# Patient Record
Sex: FEMALE | Race: WHITE | Employment: OTHER | URBAN - METROPOLITAN AREA
[De-identification: names, ages, dates, MRNs, and addresses within clinical notes are randomized per-mention and may not be internally consistent; named-entity substitution may affect disease eponyms.]

---

## 2017-01-26 ENCOUNTER — OFFICE VISIT (OUTPATIENT)
Dept: FAMILY MEDICINE CLINIC | Age: 32
End: 2017-01-26

## 2017-01-26 VITALS
SYSTOLIC BLOOD PRESSURE: 103 MMHG | WEIGHT: 154.6 LBS | HEIGHT: 63 IN | RESPIRATION RATE: 17 BRPM | HEART RATE: 64 BPM | DIASTOLIC BLOOD PRESSURE: 65 MMHG | OXYGEN SATURATION: 100 % | BODY MASS INDEX: 27.39 KG/M2 | TEMPERATURE: 98.2 F

## 2017-01-26 DIAGNOSIS — N92.6 MISSED MENSES: Primary | ICD-10-CM

## 2017-01-26 LAB
HCG URINE, QL. (POC): POSITIVE
VALID INTERNAL CONTROL?: YES

## 2017-01-26 NOTE — PROGRESS NOTES
Subjective:   Michael Maria is a 32 y.o. female who is being seen today for possible pregnancy due to missed menses. LMP beginning of Dec.  Unsure of exact date due to hx of irregular menses 2/2 PCOS. She is sexually active with 1 partner and is not using contraception. [id number]  Hx : no  Hx complications in prior pregnancies: no  Taking PNV: yes  Regular periods/certain LMP: no  1 episode of brief abdominal cramping with exercising    Allergies- reviewed:   No Known Allergies      Medications- reviewed:   Current Outpatient Prescriptions   Medication Sig    multivitamin with iron (DAILY MULTI-VITAMINS/IRON) tablet Take 1 Tab by mouth daily.  norethindrone (MICRONOR) 0.35 mg tab Take 1 Tab by mouth daily.  norethindrone (MICRONOR) 0.35 mg tablet Take 1 Tab by mouth daily.  prenatal vit-fe fum-fa-dss (PRENATAL 19) 29-1 mg Tab Take  by mouth. No current facility-administered medications for this visit. Past Medical History- reviewed:  Past Medical History   Diagnosis Date    Irregular menses     PCOS (polycystic ovarian syndrome)          Past Surgical History- reviewed:   History reviewed. No pertinent past surgical history. Social History- reviewed:  Social History     Social History    Marital status:      Spouse name: N/A    Number of children: N/A    Years of education: N/A     Occupational History    Not on file. Social History Main Topics    Smoking status: Never Smoker    Smokeless tobacco: Never Used    Alcohol use No    Drug use: No    Sexual activity: Yes     Partners: Male     Other Topics Concern    Not on file     Social History Narrative         Immunizations- reviewed: There is no immunization history for the selected administration types on file for this patient.     ROS:  GI: No nausea, vomiting  : No vaginal bleeding      Objective:     Visit Vitals    /65 (BP 1 Location: Left arm, BP Patient Position: Sitting)  Pulse 64    Temp 98.2 °F (36.8 °C) (Oral)    Resp 17    Ht 5' 3\" (1.6 m)    Wt 154 lb 9.6 oz (70.1 kg)    LMP 12/05/2016    SpO2 100%    BMI 27.39 kg/m2       Physical Exam:  GENERAL APPEARANCE: alert, well appearing, in no apparent distress  CV: RRR  LUNGS: CTAB   ABD: soft, nontender, no rebound or guarding  PSYCH: normal mood and affect    Labs:  Urine pregnancy test   Recent Results (from the past 12 hour(s))   AMB POC URINE PREGNANCY TEST, VISUAL COLOR COMPARISON    Collection Time: 01/26/17 11:30 AM   Result Value Ref Range    VALID INTERNAL CONTROL POC Yes     HCG urine, Ql. (POC) Positive Negative         Assessment:       ICD-10-CM ICD-9-CM    1. Missed menses N92.6 626.4 AMB POC URINE PREGNANCY TEST, VISUAL COLOR COMPARISON     Pregnancy at unknown GA. Plan:   · Return to clinic in 2 week(s) for initial prenatal visit and dating ultrasound   · Prenatal vitamins  · Have reviewed miscarriage precautions       Orders Placed This Encounter    AMB POC URINE PREGNANCY TEST, VISUAL COLOR COMPARISON         I have discussed the diagnosis with the patient and the intended plan as seen in the above orders. The patient has received an after-visit summary and questions were answered concerning future plans. I have discussed medication side effects and warnings with the patient as well. Informed pt to return to the office or go to the ER if she experiences vaginal bleeding, vaginal discharge, leaking of fluid, pelvic cramping.       Santiago Guerrero, DO

## 2017-01-26 NOTE — PROGRESS NOTES
Chief Complaint   Patient presents with    Missed Menses     1. Have you been to the ER, urgent care clinic since your last visit? Hospitalized since your last visit? No    2. Have you seen or consulted any other health care providers outside of the [address] [place] [address] since your last visit? Include any pap smears or colon screening.  No

## 2017-01-26 NOTE — PATIENT INSTRUCTIONS
Learning About Pregnancy  Your Care Instructions  Your health in the early weeks of your pregnancy is particularly important for your babys health. Take good care of yourself. Anything you do that harms your body can also harm your baby. Make sure to go to all of your doctor appointments. Regular checkups will help keep you and your baby healthy. Follow-up care is a key part of your treatment and safety. Be sure to make and go to all appointments, and call your doctor if you are having problems. Its also a good idea to know your test results and keep a list of the medicines you take. How can you care for yourself at home? Diet  · Eat a balanced diet. Make sure your diet includes plenty of beans, peas, and leafy green vegetables. · Do not skip meals or go for many hours without eating. If you are nauseated, try to eat a small, healthy snack every 2 to 3 hours. · Do not eat fish that has a high level of mercury, such as shark, swordfish, or mackerel. Do not eat more than one can of tuna each week. · Drink plenty of fluids, enough so that your urine is light yellow or clear like water. If you have kidney, heart, or liver disease and have to limit fluids, talk with your doctor before you increase the amount of fluids you drink. · Cut down on caffeine, such as coffee, tea, and cola. · Do not drink alcohol, such as beer, wine, or hard liquor. · Take a multivitamin that contains at least 400 micrograms (mcg) of folic acid to help prevent birth defects. Fortified cereal and whole wheat bread are good additional sources of folic acid. · Increase the calcium in your diet. Try to drink a quart of skim milk each day. You may also take calcium supplements and choose foods such as cheese and yogurt. Lifestyle  · Make sure you go to your follow-up appointments. · Get plenty of rest. You may be unusually tired while you are pregnant. · Get at least 30 minutes of exercise on most days of the week.  Walking is a good choice. If you have not exercised in the past, start out slowly. Take several short walks each day. · Do not smoke. If you need help quitting, talk to your doctor about stop-smoking programs. These can increase your chances of quitting for good. · Do not touch cat feces or litter boxes. Also, wash your hands after you handle raw meat, and fully cook all meat before you eat it. Wear gloves when you work in the yard or garden, and wash your hands well when you are done. Cat feces, raw or undercooked meat, and contaminated dirt can cause an infection that may harm your baby or lead to a miscarriage. · Do not use saunas or hot tubs. Raising your body temperature may harm your baby. · Avoid chemical fumes, paint fumes, or poisons. · Do not use illegal drugs or alcohol. Medicines  · Review all of your medicines with your doctor. Some of your routine medicines may need to be changed to protect your baby. · Use acetaminophen (Tylenol) to relieve minor problems, such as a mild headache or backache or a mild fever with cold symptoms. Do not use nonsteroidal anti-inflammatory drugs (NSAIDs), such as ibuprofen (Advil, Motrin) or naproxen (Aleve), unless your doctor says it is okay. · Do not take two or more pain medicines at the same time unless the doctor told you to. Many pain medicines have acetaminophen, which is Tylenol. Too much acetaminophen (Tylenol) can be harmful. · Take your medicines exactly as prescribed. Call your doctor if you think you are having a problem with your medicine. To manage morning sickness  · If you feel sick when you first wake up, try eating a small snack (such as crackers) before you get out of bed. Allow some time to digest the snack, and then get out of bed slowly. · Do not skip meals or go for long periods without eating. An empty stomach can make nausea worse. · Eat small, frequent meals instead of three large meals each day. · Drink plenty of fluids.  Sports drinks, such as Gatorade or Powerade, are good choices. · Eat foods that are high in protein but low in fat. · If you are taking iron supplements, ask your doctor if they are necessary. Iron can make nausea worse. · Avoid any smells, such as coffee, that make you feel sick. · Get lots of rest. Morning sickness may be worse when you are tired. Where can you learn more? Go to http://christiana-divine.info/. Enter R635 in the search box to learn more about \"Learning About Pregnancy. \"  Current as of: May 30, 2016  Content Version: 11.1  © 3955-0575 [employer], Payment plugin. Care instructions adapted under license by [employer] (which disclaims liability or warranty for this information). If you have questions about a medical condition or this instruction, always ask your healthcare professional. [address] any warranty or liability for your use of this information.

## 2017-01-26 NOTE — MR AVS SNAPSHOT
Visit Information Date & Time Provider Department Dept. Phone Encounter #  
 1/26/2017 11:15 AM Hanna Gutierrez DO [address] [phone] [id number] Follow-up Instructions Return in about 2 weeks (around 2/9/2017) for IOB and ultrasound . Upcoming Health Maintenance Date Due DTaP/Tdap/Td series (1 - Tdap) 4/28/2006 PAP AKA CERVICAL CYTOLOGY 1/17/2016 INFLUENZA AGE 9 TO ADULT 8/1/2016 Allergies as of 1/26/2017  Review Complete On: 1/26/2017 By: Hanna Gutierrez DO No Known Allergies Current Immunizations  Never Reviewed No immunizations on file. Not reviewed this visit You Were Diagnosed With   
  
 Codes Comments Missed menses    -  Primary ICD-10-CM: N92.6 ICD-9-CM: 626.4 Vitals BP Pulse Temp Resp Height(growth percentile) Weight(growth percentile) 103/65 (BP 1 Location: Left arm, BP Patient Position: Sitting) 64 98.2 °F (36.8 °C) (Oral) 17 5' 3\" (1.6 m) 154 lb 9.6 oz (70.1 kg) LMP SpO2 BMI OB Status Smoking Status 12/05/2016 100% 27.39 kg/m2 Having regular periods Never Smoker Vitals History BMI and BSA Data Body Mass Index Body Surface Area  
 27.39 kg/m 2 1.77 m 2 Preferred Pharmacy Pharmacy Name Phone [facility] [address] [place] [address] [address] [facility] [phone] Your Updated Medication List  
  
   
This list is accurate as of: 1/26/17 11:57 AM.  Always use your most recent med list.  
  
  
  
  
 DAILY MULTI-VITAMINS/IRON tablet Generic drug:  multivitamin with iron Take 1 Tab by mouth daily. * norethindrone 0.35 mg Tab Commonly known as:  Magdaleno & Magdaleno Take 1 Tab by mouth daily. * norethindrone 0.35 mg Tab Commonly known as:  Magdaleno & Magdaleno Take 1 Tab by mouth daily. PRENATAL 19 (WITH DOCUSATE) 29-1 mg Tab Generic drug:  prenatal vit-fe fum-fa-dss Take  by mouth. * Notice: This list has 2 medication(s) that are the same as other medications prescribed for you. Read the directions carefully, and ask your doctor or other care provider to review them with you. We Performed the Following AMB POC URINE PREGNANCY TEST, VISUAL COLOR COMPARISON [83973 CPT(R)] Follow-up Instructions Return in about 2 weeks (around 2/9/2017) for IOB and ultrasound . Patient Instructions Learning About Pregnancy Your Care Instructions Your health in the early weeks of your pregnancy is particularly important for your babys health. Take good care of yourself. Anything you do that harms your body can also harm your baby. Make sure to go to all of your doctor appointments. Regular checkups will help keep you and your baby healthy. Follow-up care is a key part of your treatment and safety. Be sure to make and go to all appointments, and call your doctor if you are having problems. Its also a good idea to know your test results and keep a list of the medicines you take. How can you care for yourself at home? Diet · Eat a balanced diet. Make sure your diet includes plenty of beans, peas, and leafy green vegetables. · Do not skip meals or go for many hours without eating. If you are nauseated, try to eat a small, healthy snack every 2 to 3 hours. · Do not eat fish that has a high level of mercury, such as shark, swordfish, or mackerel. Do not eat more than one can of tuna each week. · Drink plenty of fluids, enough so that your urine is light yellow or clear like water. If you have kidney, heart, or liver disease and have to limit fluids, talk with your doctor before you increase the amount of fluids you drink. · Cut down on caffeine, such as coffee, tea, and cola. · Do not drink alcohol, such as beer, wine, or hard liquor.  
· Take a multivitamin that contains at least 400 micrograms (mcg) of folic acid to help prevent birth defects. Fortified cereal and whole wheat bread are good additional sources of folic acid. · Increase the calcium in your diet. Try to drink a quart of skim milk each day. You may also take calcium supplements and choose foods such as cheese and yogurt. Lifestyle · Make sure you go to your follow-up appointments. · Get plenty of rest. You may be unusually tired while you are pregnant. · Get at least 30 minutes of exercise on most days of the week. Walking is a good choice. If you have not exercised in the past, start out slowly. Take several short walks each day. · Do not smoke. If you need help quitting, talk to your doctor about stop-smoking programs. These can increase your chances of quitting for good. · Do not touch cat feces or litter boxes. Also, wash your hands after you handle raw meat, and fully cook all meat before you eat it. Wear gloves when you work in the yard or garden, and wash your hands well when you are done. Cat feces, raw or undercooked meat, and contaminated dirt can cause an infection that may harm your baby or lead to a miscarriage. · Do not use saunas or hot tubs. Raising your body temperature may harm your baby. · Avoid chemical fumes, paint fumes, or poisons. · Do not use illegal drugs or alcohol. Medicines · Review all of your medicines with your doctor. Some of your routine medicines may need to be changed to protect your baby. · Use acetaminophen (Tylenol) to relieve minor problems, such as a mild headache or backache or a mild fever with cold symptoms. Do not use nonsteroidal anti-inflammatory drugs (NSAIDs), such as ibuprofen (Advil, Motrin) or naproxen (Aleve), unless your doctor says it is okay. · Do not take two or more pain medicines at the same time unless the doctor told you to. Many pain medicines have acetaminophen, which is Tylenol. Too much acetaminophen (Tylenol) can be harmful. · Take your medicines exactly as prescribed. Call your doctor if you think you are having a problem with your medicine. To manage morning sickness · If you feel sick when you first wake up, try eating a small snack (such as crackers) before you get out of bed. Allow some time to digest the snack, and then get out of bed slowly. · Do not skip meals or go for long periods without eating. An empty stomach can make nausea worse. · Eat small, frequent meals instead of three large meals each day. · Drink plenty of fluids. Sports drinks, such as Gatorade or Powerade, are good choices. · Eat foods that are high in protein but low in fat. · If you are taking iron supplements, ask your doctor if they are necessary. Iron can make nausea worse. · Avoid any smells, such as coffee, that make you feel sick. · Get lots of rest. Morning sickness may be worse when you are tired. Where can you learn more? Go to http://christiana-divine.info/. Enter V546 in the search box to learn more about \"Learning About Pregnancy. \" Current as of: May 30, 2016 Content Version: 11.1 © 2546-1368 [employer]. Care instructions adapted under license by [employer] (which disclaims liability or warranty for this information). If you have questions about a medical condition or this instruction, always ask your healthcare professional. [address] any warranty or liability for your use of this information. Introducing [facility]! Saw Richardson introduces [employer] patient portal. Now you can access parts of your medical record, email your doctor's office, and request medication refills online. 1. In your internet browser, go to [email or web address] 2. Click on the First Time User? Click Here link in the Sign In box. You will see the New Member Sign Up page. 3. Enter your [employer] Access Code exactly as it appears below.  You will not need to use this code after youve completed the sign-up process. If you do not sign up before the expiration date, you must request a new code. · [employer] Access Code: [email or web address] Expires: 4/26/2017 11:57 AM 
 
4. Enter the last four digits of your Social Security Number (xxxx) and Date of Birth (mm/dd/yyyy) as indicated and click Submit. You will be taken to the next sign-up page. 5. Create a El[employer] ID. This will be your [employer] login ID and cannot be changed, so think of one that is secure and easy to remember. 6. Create a [employer] password. You can change your password at any time. 7. Enter your Password Reset Question and Answer. This can be used at a later time if you forget your password. 8. Enter your e-mail address. You will receive e-mail notification when new information is available in [address]. 9. Click Sign Up. You can now view and download portions of your medical record. 10. Click the Download Summary menu link to download a portable copy of your medical information. If you have questions, please visit the Frequently Asked Questions section of the [employer] website. Remember, [employer] is NOT to be used for urgent needs. For medical emergencies, dial 911. Now available from your iPhone and Android! Please provide this summary of care documentation to your next provider. Your primary care clinician is listed as [address] [place] [address]. If you have any questions after today's visit, please call [phone].

## 2017-02-10 ENCOUNTER — INITIAL PRENATAL (OUTPATIENT)
Dept: FAMILY MEDICINE CLINIC | Age: 32
End: 2017-02-10

## 2017-02-10 VITALS
DIASTOLIC BLOOD PRESSURE: 68 MMHG | HEART RATE: 98 BPM | OXYGEN SATURATION: 99 % | WEIGHT: 159 LBS | SYSTOLIC BLOOD PRESSURE: 118 MMHG | HEIGHT: 63 IN | BODY MASS INDEX: 28.17 KG/M2 | TEMPERATURE: 97.9 F

## 2017-02-10 DIAGNOSIS — Z34.01 ENCOUNTER FOR SUPERVISION OF NORMAL FIRST PREGNANCY IN FIRST TRIMESTER: Primary | ICD-10-CM

## 2017-02-10 LAB
ANTIBODY SCREEN, EXTERNAL: NEGATIVE
BILIRUB UR QL STRIP: NEGATIVE
GLUCOSE UR-MCNC: NEGATIVE MG/DL
HBSAG, EXTERNAL: NEGATIVE
HIV, EXTERNAL: NORMAL
KETONES P FAST UR STRIP-MCNC: NORMAL MG/DL
PH UR STRIP: 6 [PH] (ref 4.6–8)
PROT UR QL STRIP: NEGATIVE MG/DL
RPR, EXTERNAL: NORMAL
RUBELLA, EXTERNAL: NORMAL
SP GR UR STRIP: 1.02 (ref 1–1.03)
TYPE, ABO & RH, EXTERNAL: NORMAL
UA UROBILINOGEN AMB POC: NORMAL (ref 0.2–1)
URINALYSIS CLARITY POC: CLEAR
URINALYSIS COLOR POC: YELLOW
URINE BLOOD POC: NEGATIVE
URINE LEUKOCYTES POC: NEGATIVE
URINE NITRITES POC: NEGATIVE

## 2017-02-10 NOTE — PROGRESS NOTES
History and Physical    Patient: Chandrika Cornell MRN: [de-identified]  SSN: [id number]    YOB: 1985  Age: 32 y.o. Sex: female      Subjective:      Chandrika Cornell is a 32 y.o. female  at [id number] who presents for [address] visit. Past Medical History   Diagnosis Date    Irregular menses     PCOS (polycystic ovarian syndrome)      History reviewed. No pertinent past surgical history. Family History   Problem Relation Age of Onset    Hypertension Mother     Stroke Mother     Other Mother      Sturge Radford Syndrome    Asthma Father     Elevated Lipids Father     Other Sister      scolosis     Social History   Substance Use Topics    Smoking status: Never Smoker    Smokeless tobacco: Never Used    Alcohol use No      Prior to Admission medications    Medication Sig Start Date End Date Taking? Authorizing Provider   multivitamin with iron (DAILY MULTI-VITAMINS/IRON) tablet Take 1 Tab by mouth daily. Yes Historical Provider   prenatal vit-fe fum-fa-dss (PRENATAL 19) 29-1 mg Tab Take  by mouth. Yes Historical Provider   norethindrone (MICRONOR) 0.35 mg tab Take 1 Tab by mouth daily. 16   Fabien Paz MD   norethindrone (MICRONOR) 0.35 mg tablet Take 1 Tab by mouth daily. 9/11/15   Terrence De La Cruz MD        No Known Allergies    Review of Systems:  ROS negative except as noted in HPI.     Objective:     Vitals:    02/10/17 1446   BP: 118/68   Pulse: 98   Temp: 97.9 °F (36.6 °C)   TempSrc: Oral   SpO2: 99%   Weight: 159 lb (72.1 kg)   Height: 5' 3\" (1.6 m)        Physical Exam:  See prenatal physical exam.    Assessment/Plan:   25yo [id number] @ approx 9wk 4days by uncertain LMP here for IOB visit  · IOB labs with pap today  · Awaiting dating sono for uncertain LMP then can refer to [facility] for first tri screening if pt desires     Signed By: Jailyn Flores DO     2017

## 2017-02-10 NOTE — PROGRESS NOTES
Chief Complaint   Patient presents with    Initial Prenatal Visit     1. Have you been to the ER, urgent care clinic since your last visit? Hospitalized since your last visit? No    2. Have you seen or consulted any other health care providers outside of the [address] [place] [address] since your last visit? Include any pap smears or colon screening.  No

## 2017-02-10 NOTE — MR AVS SNAPSHOT
Visit Information Date & Time Provider Department Dept. Phone Encounter #  
 2/10/2017  2:30 PM Afshan Moncada DO [address] [facility] [phone] [id number] Upcoming Health Maintenance Date Due DTaP/Tdap/Td series (1 - Tdap) 4/28/2006 PAP AKA CERVICAL CYTOLOGY 1/17/2016 INFLUENZA AGE 9 TO ADULT 8/1/2016 Allergies as of 2/10/2017  Review Complete On: 2/10/2017 By: Afshan Moncada DO No Known Allergies Current Immunizations  Never Reviewed No immunizations on file. Not reviewed this visit You Were Diagnosed With   
  
 Codes Comments Less than 8 weeks gestation of pregnancy    -  Primary ICD-10-CM: Z3A.01 
ICD-9-CM: V22.2 Vitals BP Pulse Temp Height(growth percentile) Weight(growth percentile) LMP  
 118/68 (BP 1 Location: Right arm, BP Patient Position: Sitting) 98 97.9 °F (36.6 °C) (Oral) 5' 3\" (1.6 m) 159 lb (72.1 kg) 12/05/2016 SpO2 BMI OB Status Smoking Status 99% 28.17 kg/m2 Pregnant Never Smoker BMI and BSA Data Body Mass Index Body Surface Area  
 28.17 kg/m 2 1.79 m 2 Preferred Pharmacy Pharmacy Name Phone [facility] [address]  [facility] [phone] Your Updated Medication List  
  
   
This list is accurate as of: 2/10/17  2:59 PM.  Always use your most recent med list.  
  
  
  
  
 DAILY MULTI-VITAMINS/IRON tablet Generic drug:  multivitamin with iron Take 1 Tab by mouth daily. * norethindrone 0.35 mg Tab Commonly known as:  Magdaleno & Magdaleno Take 1 Tab by mouth daily. * norethindrone 0.35 mg Tab Commonly known as:  Magdaleno & Magdaleno Take 1 Tab by mouth daily. PRENATAL 19 (WITH DOCUSATE) 29-1 mg Tab Generic drug:  prenatal vit-fe fum-fa-dss Take  by mouth. * Notice:   This list has 2 medication(s) that are the same as other medications prescribed for you. Read the directions carefully, and ask your doctor or other care provider to review them with you. We Performed the Following AMB POC URINALYSIS DIP STICK AUTO W/O MICRO [29599 CPT(R)] ANTIBODY SCREEN [id number] CPT(R)] CBC WITH AUTOMATED DIFF [32813 CPT(R)] Scherer Fast / [facility] [id number] CPT(R)] CULTURE, URINE [id number] CPT(R)] HEP B SURFACE AG [id number] CPT(R)] HEPATITIS C AB [35824 CPT(R)] HIV 1/2 AG/AB, 4TH GENERATION,W RFLX CONFIRM [[id number] Custom] PAP IG, APTIMA HPV AND RFX 16/18,45 ([id number]) [[id number] Custom] RPR [26804 CPT(R)] RUBELLA AB, IGG [id number] CPT(R)] TYPE, ABO & RH [25935 CPT(R)] Patient Instructions Weeks 6 to 10 of Your Pregnancy: Care Instructions Your Care Instructions Congratulations on your pregnancy. This is an exciting and important time for you. During the first 6 to 10 weeks of your pregnancy, your body goes through many changes. Your baby grows very fast, even though you cannot feel it yet. You may start to notice that you feel different, both in your body and your emotions. Because each woman's pregnancy is unique, there is no right way to feel. You may feel the healthiest you have ever been, or you may feel tired or sick to your stomach (\"morning sickness\"). These early weeks are a time to make healthy choices and to eat the best foods for you and your baby. This care sheet will give you some ideas. This is also a good time to think about birth defects testing. These are tests done during pregnancy to look for possible problems with the baby. First trimester tests for birth defects can be done between 8 and 17 weeks of pregnancy, depending on the test. Talk with your doctor about what kinds of tests are available. Follow-up care is a key part of your treatment and safety.  Be sure to make and go to all appointments, and call your doctor if you are having problems. It's also a good idea to know your test results and keep a list of the medicines you take. How can you care for yourself at home? Eat well · Eat at least 3 meals and 2 healthy snacks every day. Eat fresh, whole foods, including: ¨ 7 or more servings of bread, tortillas, cereal, rice, pasta, or oatmeal. 
¨ 3 or more servings of vegetables, especially leafy green vegetables. ¨ 2 or more servings of fruits. ¨ 3 or more servings of milk, yogurt, or cheese. ¨ 2 or more servings of meat, turkey, chicken, fish, eggs, or dried beans. · Drink plenty of fluids, especially water. Avoid sodas and other sweetened drinks. · Choose foods that have important vitamins for your baby, such as calcium, iron, and folate. ¨ Dairy products, tofu, canned fish with bones, almonds, broccoli, dark leafy greens, corn tortillas, and fortified orange juice are good sources of calcium. ¨ Beef, poultry, liver, spinach, lentils, dried beans, fortified cereals, and dried fruits are rich in iron. ¨ Dark leafy greens, broccoli, asparagus, liver, fortified cereals, orange juice, peanuts, and almonds are good sources of folate. · Avoid foods that could harm your baby. ¨ Do not eat raw or undercooked meat, chicken, or fish (such as sushi or raw oysters). ¨ Do not eat raw eggs or foods that contain raw eggs, such as Caesar dressing. ¨ Do not eat soft cheeses and unpasteurized dairy foods, such as Brie, feta, or blue cheese. ¨ Do not eat fish that contains a lot of mercury, such as shark, swordfish, tilefish, or trang mackerel. Do not eat more than 6 ounces of tuna each week. ¨ Do not eat raw sprouts, especially alfalfa sprouts. ¨ Cut down on caffeine, such as coffee, tea, and cola. Protect yourself and your baby · Do not touch vilma litter or cat feces. They can cause an infection that could harm your baby. · High body temperature can be harmful to your baby.  So if you want to use a sauna or hot tub, be sure to talk to your doctor about how to use it safely. [place] with morning sickness · Sip small amounts of water, juices, or shakes. Try drinking between meals, not with meals. · Eat 5 or 6 small meals a day. Try dry toast or crackers when you first get up, and eat breakfast a little later. · Avoid spicy, greasy, and fatty foods. · When you feel sick, open your windows or go for a short walk to get fresh air. · Try nausea wristbands. These help some women. · Tell your doctor if you think your prenatal vitamins make you sick. Where can you learn more? Go to http://christiana-divine.info/. Enter G112 in the search box to learn more about \"Weeks 6 to 10 of Your Pregnancy: Care Instructions. \" Current as of: May 30, 2016 Content Version: 11.1 © 4467-1796 FraudMetrix. Care instructions adapted under license by Ensighten (which disclaims liability or warranty for this information). If you have questions about a medical condition or this instruction, always ask your healthcare professional. [address] any warranty or liability for your use of this information. Introducing [facility]! Herb Bhagat introduces Prixel patient portal. Now you can access parts of your medical record, email your doctor's office, and request medication refills online. 1. In your internet browser, go to [email or web address] 2. Click on the First Time User? Click Here link in the Sign In box. You will see the New Member Sign Up page. 3. Enter your Prixel Access Code exactly as it appears below. You will not need to use this code after youve completed the sign-up process. If you do not sign up before the expiration date, you must request a new code. · Prixel Access Code: [email or web address] Expires: 4/26/2017 11:57 AM 
 
4.  Enter the last four digits of your Social Security Number (xxxx) and Date of Birth (mm/dd/yyyy) as indicated and click Submit. You will be taken to the next sign-up page. 5. Create a Biopipe Global ID. This will be your Biopipe Global login ID and cannot be changed, so think of one that is secure and easy to remember. 6. Create a Biopipe Global password. You can change your password at any time. 7. Enter your Password Reset Question and Answer. This can be used at a later time if you forget your password. 8. Enter your e-mail address. You will receive e-mail notification when new information is available in [address] 9. Click Sign Up. You can now view and download portions of your medical record. 10. Click the Download Summary menu link to download a portable copy of your medical information. If you have questions, please visit the Frequently Asked Questions section of the Biop[employer] Global website. Remember, Biopipe Global is NOT to be used for urgent needs. For medical emergencies, dial 911. Now available from your iPhone and Android! Please provide this summary of care documentation to your next provider. Your primary care clinician is listed as [address] [place] [address]. If you have any questions after today's visit, please call [phone].

## 2017-02-10 NOTE — PATIENT INSTRUCTIONS
Weeks 6 to 10 of Your Pregnancy: Care Instructions  Your Care Instructions    Congratulations on your pregnancy. This is an exciting and important time for you. During the first 6 to 10 weeks of your pregnancy, your body goes through many changes. Your baby grows very fast, even though you cannot feel it yet. You may start to notice that you feel different, both in your body and your emotions. Because each woman's pregnancy is unique, there is no right way to feel. You may feel the healthiest you have ever been, or you may feel tired or sick to your stomach (\"morning sickness\"). These early weeks are a time to make healthy choices and to eat the best foods for you and your baby. This care sheet will give you some ideas. This is also a good time to think about birth defects testing. These are tests done during pregnancy to look for possible problems with the baby. First trimester tests for birth defects can be done between 8 and 17 weeks of pregnancy, depending on the test. Talk with your doctor about what kinds of tests are available. Follow-up care is a key part of your treatment and safety. Be sure to make and go to all appointments, and call your doctor if you are having problems. It's also a good idea to know your test results and keep a list of the medicines you take. How can you care for yourself at home? Eat well  · Eat at least 3 meals and 2 healthy snacks every day. Eat fresh, whole foods, including:  ¨ 7 or more servings of bread, tortillas, cereal, rice, pasta, or oatmeal.  ¨ 3 or more servings of vegetables, especially leafy green vegetables. ¨ 2 or more servings of fruits. ¨ 3 or more servings of milk, yogurt, or cheese. ¨ 2 or more servings of meat, turkey, chicken, fish, eggs, or dried beans. · Drink plenty of fluids, especially water. Avoid sodas and other sweetened drinks. · Choose foods that have important vitamins for your baby, such as calcium, iron, and folate.   ¨ Dairy products, tofu, canned fish with bones, almonds, broccoli, dark leafy greens, corn tortillas, and fortified orange juice are good sources of calcium. ¨ Beef, poultry, liver, spinach, lentils, dried beans, fortified cereals, and dried fruits are rich in iron. ¨ Dark leafy greens, broccoli, asparagus, liver, fortified cereals, orange juice, peanuts, and almonds are good sources of folate. · Avoid foods that could harm your baby. ¨ Do not eat raw or undercooked meat, chicken, or fish (such as sushi or raw oysters). ¨ Do not eat raw eggs or foods that contain raw eggs, such as Caesar dressing. ¨ Do not eat soft cheeses and unpasteurized dairy foods, such as Brie, feta, or blue cheese. ¨ Do not eat fish that contains a lot of mercury, such as shark, swordfish, tilefish, or trang mackerel. Do not eat more than 6 ounces of tuna each week. ¨ Do not eat raw sprouts, especially alfalfa sprouts. ¨ Cut down on caffeine, such as coffee, tea, and cola. Protect yourself and your baby  · Do not touch vilma litter or cat feces. They can cause an infection that could harm your baby. · High body temperature can be harmful to your baby. So if you want to use a sauna or hot tub, be sure to talk to your doctor about how to use it safely. [place] with morning sickness  · Sip small amounts of water, juices, or shakes. Try drinking between meals, not with meals. · Eat 5 or 6 small meals a day. Try dry toast or crackers when you first get up, and eat breakfast a little later. · Avoid spicy, greasy, and fatty foods. · When you feel sick, open your windows or go for a short walk to get fresh air. · Try nausea wristbands. These help some women. · Tell your doctor if you think your prenatal vitamins make you sick. Where can you learn more? Go to http://cody.info/. Enter G112 in the search box to learn more about \"Weeks 6 to 10 of Your Pregnancy: Care Instructions. \"  Current as of:  May 30, 2016  Content Version: 11.1  © 7541-1442 V[employer] Research[employer]. Care instructions adapted under license by Giftango (which disclaims liability or warranty for this information). If you have questions about a medical condition or this instruction, always ask your healthcare professional. [address] any warranty or liability for your use of this information.

## 2017-02-13 LAB — BACTERIA UR CULT: ABNORMAL

## 2017-02-15 DIAGNOSIS — O23.41 UTI IN PREGNANCY, FIRST TRIMESTER: Primary | ICD-10-CM

## 2017-02-15 RX ORDER — NITROFURANTOIN 25; 75 MG/1; MG/1
100 CAPSULE ORAL 2 TIMES DAILY
Qty: 14 CAP | Refills: 0 | Status: SHIPPED | OUTPATIENT
Start: 2017-02-15 | End: 2017-02-22

## 2017-02-21 ENCOUNTER — OFFICE VISIT (OUTPATIENT)
Dept: FAMILY MEDICINE CLINIC | Age: 32
End: 2017-02-21

## 2017-02-21 VITALS
HEIGHT: 63 IN | HEART RATE: 63 BPM | RESPIRATION RATE: 16 BRPM | BODY MASS INDEX: 28.92 KG/M2 | WEIGHT: 163.2 LBS | OXYGEN SATURATION: 98 % | SYSTOLIC BLOOD PRESSURE: 121 MMHG | TEMPERATURE: 98 F | DIASTOLIC BLOOD PRESSURE: 74 MMHG

## 2017-02-21 DIAGNOSIS — Z34.01 ENCOUNTER FOR SUPERVISION OF NORMAL FIRST PREGNANCY IN FIRST TRIMESTER: Primary | ICD-10-CM

## 2017-02-21 NOTE — PROGRESS NOTES
Dating Ultrasound Procedure Report    Celesta Alpers is a 32 y.o. with pregnancy at 11w5d by LMP here for dating ultrasound. Role of ultrasound in pregnancy discussed with patient. Patient consents to undergo dating ultrasound. [facility]  OFFICE PROCEDURE PROGRESS NOTE      Chart reviewed for the following:   Woody De Oliveira MD, have reviewed the History, Physical and updated the Allergic reactions for Elissa L Pablo     TIME OUT performed immediately prior to start of procedure:   Woody De Oliveira MD, have performed the following reviews on Celesta Alpers prior to the start of the procedure:            * Patient was identified by name and date of birth   * Agreement on procedure being performed was verified  * Risks and Benefits explained to the patient  * Procedure site verified and marked as necessary  * Patient was positioned for comfort  * Consent was signed and verified     Time: 4:15 PM    Date of procedure: 2017    Procedure performed by:  Josh Harmon MD    Provider assisted by: Milady Abdul MD    Patient assisted by: self    How tolerated by patient: tolerated the procedure well with no complications    Ultrasound performed at bedside for evaluation of pregnancy. Ultrasound Type: Transabdominal  Findings: Single  intrauterine pregnancy with EGA 9 [id number] by crown rump length on transabdominal ultrasound. Positive fetal movement, fetal heart beat present, normal appearing amiotic fluid, normal uterus, other maternal structures not visualized. GUERO 2017. Estimated Gestational Age by Ultrasound: 9 weeks 0 days  Dr. Stephan Goddard (attending) present for entire procedure. Plan:   32yo  at 9 weeks 0 days confirmed by US  1. The patient's GA has a discrepancy in 2 weeks and 4 days based on her LMP. Will change GA and GUERO based on US. 2.Will collect prenatal labs ordered by Dr. Nhan Paul on 2/10/2017  3.  Follow up with  Vest in 3 weeks for routine prenatal visit   UTI with pan susceptible E. Coli. The Rx for Macrobid was sent to the pharmacy but the pt never filled that. We discussed the importance of treating the UTI especially during the pregnancy.  The pt was advised to take the full course of Macrobid.  -Will collect the urine culture next visit after the treatment course    Saray Hooks MD; [facility] Medicine Resident

## 2017-02-21 NOTE — PATIENT INSTRUCTIONS
Weeks 6 to 10 of Your Pregnancy: Care Instructions  Your Care Instructions    Congratulations on your pregnancy. This is an exciting and important time for you. During the first 6 to 10 weeks of your pregnancy, your body goes through many changes. Your baby grows very fast, even though you cannot feel it yet. You may start to notice that you feel different, both in your body and your emotions. Because each woman's pregnancy is unique, there is no right way to feel. You may feel the healthiest you have ever been, or you may feel tired or sick to your stomach (\"morning sickness\"). These early weeks are a time to make healthy choices and to eat the best foods for you and your baby. This care sheet will give you some ideas. This is also a good time to think about birth defects testing. These are tests done during pregnancy to look for possible problems with the baby. First trimester tests for birth defects can be done between 8 and 17 weeks of pregnancy, depending on the test. Talk with your doctor about what kinds of tests are available. Follow-up care is a key part of your treatment and safety. Be sure to make and go to all appointments, and call your doctor if you are having problems. It's also a good idea to know your test results and keep a list of the medicines you take. How can you care for yourself at home? Eat well  · Eat at least 3 meals and 2 healthy snacks every day. Eat fresh, whole foods, including:  ¨ 7 or more servings of bread, tortillas, cereal, rice, pasta, or oatmeal.  ¨ 3 or more servings of vegetables, especially leafy green vegetables. ¨ 2 or more servings of fruits. ¨ 3 or more servings of milk, yogurt, or cheese. ¨ 2 or more servings of meat, turkey, chicken, fish, eggs, or dried beans. · Drink plenty of fluids, especially water. Avoid sodas and other sweetened drinks. · Choose foods that have important vitamins for your baby, such as calcium, iron, and folate.   ¨ Dairy products, tofu, canned fish with bones, almonds, broccoli, dark leafy greens, corn tortillas, and fortified orange juice are good sources of calcium. ¨ Beef, poultry, liver, spinach, lentils, dried beans, fortified cereals, and dried fruits are rich in iron. ¨ Dark leafy greens, broccoli, asparagus, liver, fortified cereals, orange juice, peanuts, and almonds are good sources of folate. · Avoid foods that could harm your baby. ¨ Do not eat raw or undercooked meat, chicken, or fish (such as sushi or raw oysters). ¨ Do not eat raw eggs or foods that contain raw eggs, such as Caesar dressing. ¨ Do not eat soft cheeses and unpasteurized dairy foods, such as Brie, feta, or blue cheese. ¨ Do not eat fish that contains a lot of mercury, such as shark, swordfish, tilefish, or trang mackerel. Do not eat more than 6 ounces of tuna each week. ¨ Do not eat raw sprouts, especially alfalfa sprouts. ¨ Cut down on caffeine, such as coffee, tea, and cola. Protect yourself and your baby  · Do not touch vilma litter or cat feces. They can cause an infection that could harm your baby. · High body temperature can be harmful to your baby. So if you want to use a sauna or hot tub, be sure to talk to your doctor about how to use it safely. [place] with morning sickness  · Sip small amounts of water, juices, or shakes. Try drinking between meals, not with meals. · Eat 5 or 6 small meals a day. Try dry toast or crackers when you first get up, and eat breakfast a little later. · Avoid spicy, greasy, and fatty foods. · When you feel sick, open your windows or go for a short walk to get fresh air. · Try nausea wristbands. These help some women. · Tell your doctor if you think your prenatal vitamins make you sick. Where can you learn more? Go to http://christiana-divine.info/. Enter G112 in the search box to learn more about \"Weeks 6 to 10 of Your Pregnancy: Care Instructions. \"  Current as of:  May 30, 2016  Content Version: 11.1  © 1969-0096 SoPost. Care instructions adapted under license by [employer] (which disclaims liability or warranty for this information). If you have questions about a medical condition or this instruction, always ask your healthcare professional. [address] any warranty or liability for your use of this information.   Urinary Tract Infection in Pregnancy: Care Instructions  Your Care Instructions    A urinary tract infection, or UTI, is an infection of the bladder and other urinary structures. Most UTIs occur in the bladder. They often cause pain or burning when you urinate. UTI is the most common bacterial infection in pregnancy. If untreated, a UTI could lead to problems such as a kidney infection or  labor. Most UTIs can be cured with antibiotics. Your doctor will prescribe an antibiotic that is safe during pregnancy. Be sure to finish your medicine so that the infection does not spread to your kidneys. Follow-up care is a key part of your treatment and safety. Be sure to make and go to all appointments, and call your doctor if you are having problems. It's also a good idea to know your test results and keep a list of the medicines you take. How can you care for yourself at home? · Take your antibiotics as directed. Do not stop taking them just because you feel better. You need to take the full course of antibiotics. · Drink extra water and other fluids for the next day or two. This will help wash out the bacteria causing the infection. If you have kidney, heart, or liver disease and have to limit fluids, talk with your doctor before you increase the amount of fluids you drink. · Drink cranberry juice to help fight bacteria in the bladder. · Do not drink alcohol. · Urinate often. Try to empty your bladder each time. Preventing UTIs  · Drink plenty of fluids, enough so that your urine is light yellow or clear like water.  This helps you urinate often, which clears bacteria from your system. If you have kidney, heart, or liver disease and have to limit fluids, talk with your doctor before you increase the amount of fluids you drink. · Drink cranberry juice. · Urinate when you first have the urge. · Urinate right after you have sex. This is the best way for women to avoid UTIs. · When going to the bathroom, wipe from front to back to keep bacteria from entering the vagina or urethra. When should you call for help? Call your doctor now or seek immediate medical care if:  · Symptoms such as fever, chills, nausea, or vomiting get worse or appear for the first time. · You have new pain in your back just below your rib cage. This is called flank pain. · There is new blood or pus in your urine. · You have any problems with your antibiotic medicine. Watch closely for changes in your health, and be sure to contact your doctor if:  · You are not getting better after 1 day (24 hours). · You have new symptoms, such as blood in your urine. Where can you learn more? Go to http://christiana-divine.info/. Enter M982 in the search box to learn more about \"Urinary Tract Infection in Pregnancy: Care Instructions. \"  Current as of: June 8, 2016  Content Version: 11.1  © 0505-4652 [employer]. Care instructions adapted under license by OneMln (which disclaims liability or warranty for this information).  If you have questions about a medical condition or this instruction, always ask your healthcare professional. [place] [address] any warranty or liability for your use of this information.

## 2017-02-21 NOTE — PROGRESS NOTES
Chief Complaint   Patient presents with    Ultrasound     dating ultrasound     1. Have you been to the ER, urgent care clinic since your last visit? Hospitalized since your last visit? No    2. Have you seen or consulted any other health care providers outside of the [address] [place] [address] since your last visit? Include any pap smears or colon screening. No     Pt denies any bleeding, cramping or leakage of fluid.

## 2017-02-21 NOTE — MR AVS SNAPSHOT
Visit Information Date & Time Provider Department Dept. Phone Encounter #  
 2/21/2017  4:00 PM Sandrita Montes De Oca MD [facility]5 [facility] [phone] [id number] Follow-up Instructions Return in about 3 weeks (around 3/14/2017) for Routine prenatal.  
  
Your Appointments 3/10/2017  1:00 PM  
OB VISIT with Santiago Guerrero DO 1515 [facility] ([address]) Appt Note: 13 weeks [address]  
[phone]  
  
   
 [address] [facility] [phone] Upcoming Health Maintenance Date Due DTaP/Tdap/Td series (1 - Tdap) 4/28/2006 PAP AKA CERVICAL CYTOLOGY 1/17/2016 INFLUENZA AGE 9 TO ADULT 8/1/2016 Allergies as of 2/21/2017  Review Complete On: 2/21/2017 By: Sandrita Montes De Oca MD  
 No Known Allergies Current Immunizations  Never Reviewed No immunizations on file. Not reviewed this visit You Were Diagnosed With   
  
 Codes Comments Encounter for supervision of normal first pregnancy in first trimester    -  Primary ICD-10-CM: Z34.01 
ICD-9-CM: V22.0 Vitals BP Pulse Temp Resp Height(growth percentile) Weight(growth percentile) 121/74 63 98 °F (36.7 °C) (Oral) 16 5' 3\" (1.6 m) 163 lb 3.2 oz (74 kg) LMP SpO2 BMI OB Status Smoking Status 12/05/2016 (Approximate) 98% 28.91 kg/m2 Pregnant Never Smoker BMI and BSA Data Body Mass Index Body Surface Area  
 28.91 kg/m 2 1.81 m 2 Preferred Pharmacy Pharmacy Name Phone [facility] [address]  [facility] [phone] Your Updated Medication List  
  
   
This list is accurate as of: 2/21/17  4:40 PM.  Always use your most recent med list.  
  
  
  
  
 DAILY MULTI-VITAMINS/IRON tablet Generic drug:  multivitamin with iron Take 1 Tab by mouth daily. nitrofurantoin (macrocrystal-monohydrate) 100 mg capsule Commonly known as:  MACROBID Take 1 Cap by mouth two (2) times a day for 7 days. * norethindrone 0.35 mg Tab Commonly known as:  Magdaleno & Magdaleno Take 1 Tab by mouth daily. * norethindrone 0.35 mg Tab Commonly known as:  Magdaleno & Magdaleno Take 1 Tab by mouth daily. PRENATAL 19 (WITH DOCUSATE) 29-1 mg Tab Generic drug:  prenatal vit-fe fum-fa-dss Take  by mouth. * Notice: This list has 2 medication(s) that are the same as other medications prescribed for you. Read the directions carefully, and ask your doctor or other care provider to review them with you. Follow-up Instructions Return in about 3 weeks (around 3/14/2017) for Routine prenatal.  
  
  
Patient Instructions   
   
Weeks 6 to 10 of Your Pregnancy: Care Instructions Your Care Instructions Congratulations on your pregnancy. This is an exciting and important time for you. During the first 6 to 10 weeks of your pregnancy, your body goes through many changes. Your baby grows very fast, even though you cannot feel it yet. You may start to notice that you feel different, both in your body and your emotions. Because each woman's pregnancy is unique, there is no right way to feel. You may feel the healthiest you have ever been, or you may feel tired or sick to your stomach (\"morning sickness\"). These early weeks are a time to make healthy choices and to eat the best foods for you and your baby. This care sheet will give you some ideas. This is also a good time to think about birth defects testing. These are tests done during pregnancy to look for possible problems with the baby. First trimester tests for birth defects can be done between 8 and 17 weeks of pregnancy, depending on the test. Talk with your doctor about what kinds of tests are available. Follow-up care is a key part of your treatment and safety.  Be sure to make and go to all appointments, and call your doctor if you are having problems. It's also a good idea to know your test results and keep a list of the medicines you take. How can you care for yourself at home? Eat well · Eat at least 3 meals and 2 healthy snacks every day. Eat fresh, whole foods, including: ¨ 7 or more servings of bread, tortillas, cereal, rice, pasta, or oatmeal. 
¨ 3 or more servings of vegetables, especially leafy green vegetables. ¨ 2 or more servings of fruits. ¨ 3 or more servings of milk, yogurt, or cheese. ¨ 2 or more servings of meat, turkey, chicken, fish, eggs, or dried beans. · Drink plenty of fluids, especially water. Avoid sodas and other sweetened drinks. · Choose foods that have important vitamins for your baby, such as calcium, iron, and folate. ¨ Dairy products, tofu, canned fish with bones, almonds, broccoli, dark leafy greens, corn tortillas, and fortified orange juice are good sources of calcium. ¨ Beef, poultry, liver, spinach, lentils, dried beans, fortified cereals, and dried fruits are rich in iron. ¨ Dark leafy greens, broccoli, asparagus, liver, fortified cereals, orange juice, peanuts, and almonds are good sources of folate. · Avoid foods that could harm your baby. ¨ Do not eat raw or undercooked meat, chicken, or fish (such as sushi or raw oysters). ¨ Do not eat raw eggs or foods that contain raw eggs, such as Caesar dressing. ¨ Do not eat soft cheeses and unpasteurized dairy foods, such as Brie, feta, or blue cheese. ¨ Do not eat fish that contains a lot of mercury, such as shark, swordfish, tilefish, or trang mackerel. Do not eat more than 6 ounces of tuna each week. ¨ Do not eat raw sprouts, especially alfalfa sprouts. ¨ Cut down on caffeine, such as coffee, tea, and cola. Protect yourself and your baby · Do not touch vilma litter or cat feces. They can cause an infection that could harm your baby. · High body temperature can be harmful to your baby. So if you want to use a sauna or hot tub, be sure to talk to your doctor about how to use it safely. [place] with morning sickness · Sip small amounts of water, juices, or shakes. Try drinking between meals, not with meals. · Eat 5 or 6 small meals a day. Try dry toast or crackers when you first get up, and eat breakfast a little later. · Avoid spicy, greasy, and fatty foods. · When you feel sick, open your windows or go for a short walk to get fresh air. · Try nausea wristbands. These help some women. · Tell your doctor if you think your prenatal vitamins make you sick. Where can you learn more? Go to http://christiana-divine.info/. Enter G112 in the search box to learn more about \"Weeks 6 to 10 of Your Pregnancy: Care Instructions. \" Current as of: May 30, 2016 Content Version: 11.1 © 7556-8239 [employer]. Care instructions adapted under license by [employer] (which disclaims liability or warranty for this information). If you have questions about a medical condition or this instruction, always ask your healthcare professional. [place] [address] any warranty or liability for your use of this information. 
 Urinary Tract Infection in Pregnancy: Care Instructions Your Care Instructions A urinary tract infection, or UTI, is an infection of the bladder and other urinary structures. Most UTIs occur in the bladder. They often cause pain or burning when you urinate. UTI is the most common bacterial infection in pregnancy. If untreated, a UTI could lead to problems such as a kidney infection or  labor. Most UTIs can be cured with antibiotics. Your doctor will prescribe an antibiotic that is safe during pregnancy. Be sure to finish your medicine so that the infection does not spread to your kidneys. Follow-up care is a key part of your treatment and safety.  Be sure to make and go to all appointments, and call your doctor if you are having problems. It's also a good idea to know your test results and keep a list of the medicines you take. How can you care for yourself at home? · Take your antibiotics as directed. Do not stop taking them just because you feel better. You need to take the full course of antibiotics. · Drink extra water and other fluids for the next day or two. This will help wash out the bacteria causing the infection. If you have kidney, heart, or liver disease and have to limit fluids, talk with your doctor before you increase the amount of fluids you drink. · Drink cranberry juice to help fight bacteria in the bladder. · Do not drink alcohol. · Urinate often. Try to empty your bladder each time. Preventing UTIs · Drink plenty of fluids, enough so that your urine is light yellow or clear like water. This helps you urinate often, which clears bacteria from your system. If you have kidney, heart, or liver disease and have to limit fluids, talk with your doctor before you increase the amount of fluids you drink. · Drink cranberry juice. · Urinate when you first have the urge. · Urinate right after you have sex. This is the best way for women to avoid UTIs. · When going to the bathroom, wipe from front to back to keep bacteria from entering the vagina or urethra. When should you call for help? Call your doctor now or seek immediate medical care if: · Symptoms such as fever, chills, nausea, or vomiting get worse or appear for the first time. · You have new pain in your back just below your rib cage. This is called flank pain. · There is new blood or pus in your urine. · You have any problems with your antibiotic medicine. Watch closely for changes in your health, and be sure to contact your doctor if: 
· You are not getting better after 1 day (24 hours). · You have new symptoms, such as blood in your urine. Where can you learn more? Go to http://christiana-divine.info/. Enter M982 in the search box to learn more about \"Urinary Tract Infection in Pregnancy: Care Instructions. \" Current as of: June 8, 2016 Content Version: 11.1 © 7998-1005 [employer]. Care instructions adapted under license by [employer] (which disclaims liability or warranty for this information). If you have questions about a medical condition or this instruction, always ask your healthcare professional. [address] any warranty or liability for your use of this information. 
  
 
 
 
 
  
Introducing [facility]! Ruth Contreras introduces [employer] patient portal. Now you can access parts of your medical record, email your doctor's office, and request medication refills online. 1. In your internet browser, go to [email or web address] 2. Click on the First Time User? Click Here link in the Sign In box. You will see the New Member Sign Up page. 3. Enter your [employer] Access Code exactly as it appears below. You will not need to use this code after youve completed the sign-up process. If you do not sign up before the expiration date, you must request a new code. · [employer] Access Code: [email or web address] Expires: 4/26/2017 11:57 AM 
 
4. Enter the last four digits of your Social Security Number (xxxx) and Date of Birth (mm/dd/yyyy) as indicated and click Submit. You will be taken to the next sign-up page. 5. Create a [employer] ID. This will be your [employer] login ID and cannot be changed, so think of one that is secure and easy to remember. 6. Create a [employer] password. You can change your password at any time. 7. Enter your Password Reset Question and Answer. This can be used at a later time if you forget your password. 8. Enter your e-mail address. You will receive e-mail notification when new information is available in [address] 9. Click Sign Up. You can now view and download portions of your medical record. 10. Click the Download Summary menu link to download a portable copy of your medical information. If you have questions, please visit the Frequently Asked Questions section of the [employer] website. Remember, [employer] is NOT to be used for urgent needs. For medical emergencies, dial 911. Now available from your iPhone and Android! Please provide this summary of care documentation to your next provider. Your primary care clinician is listed as [address] [place] [address]. If you have any questions after today's visit, please call [phone].

## 2017-02-22 LAB
ABO GROUP BLD: NORMAL
BASOPHILS # BLD AUTO: 0 X10E3/UL (ref 0–0.2)
BASOPHILS NFR BLD AUTO: 0 %
BLD GP AB SCN SERPL QL: NEGATIVE
CYTOLOGIST CVX/VAG CYTO: NORMAL
CYTOLOGY CVX/VAG DOC THIN PREP: NORMAL
DX ICD CODE: NORMAL
EOSINOPHIL # BLD AUTO: 0.1 X10E3/UL (ref 0–0.4)
EOSINOPHIL NFR BLD AUTO: 1 %
ERYTHROCYTE [DISTWIDTH] IN BLOOD BY AUTOMATED COUNT: 13 % (ref 12.3–15.4)
HBV SURFACE AG SERPL QL IA: NEGATIVE
HCT VFR BLD AUTO: 35.7 % (ref 34–46.6)
HCV AB S/CO SERPL IA: <0.1 S/CO RATIO (ref 0–0.9)
HGB BLD-MCNC: 12.3 G/DL (ref 11.1–15.9)
HIV 1+2 AB+HIV1 P24 AG SERPL QL IA: NON REACTIVE
HPV I/H RISK 4 DNA CVX QL PROBE+SIG AMP: NEGATIVE
IMM GRANULOCYTES # BLD: 0.1 X10E3/UL (ref 0–0.1)
IMM GRANULOCYTES NFR BLD: 1 %
LYMPHOCYTES # BLD AUTO: 1.5 X10E3/UL (ref 0.7–3.1)
LYMPHOCYTES NFR BLD AUTO: 17 %
Lab: NORMAL
Lab: NORMAL
MCH RBC QN AUTO: 31.5 PG (ref 26.6–33)
MCHC RBC AUTO-ENTMCNC: 34.5 G/DL (ref 31.5–35.7)
MCV RBC AUTO: 92 FL (ref 79–97)
MONOCYTES # BLD AUTO: 0.9 X10E3/UL (ref 0.1–0.9)
MONOCYTES NFR BLD AUTO: 10 %
NEUTROPHILS # BLD AUTO: 6.1 X10E3/UL (ref 1.4–7)
NEUTROPHILS NFR BLD AUTO: 71 %
OTHER STN SPEC: NORMAL
PATH REPORT.FINAL DX SPEC: NORMAL
PLATELET # BLD AUTO: 261 X10E3/UL (ref 150–379)
RBC # BLD AUTO: 3.9 X10E6/UL (ref 3.77–5.28)
RH BLD: POSITIVE
RPR SER QL: NON REACTIVE
RUBV IGG SERPL IA-ACNC: 2.32 INDEX
STAT OF ADQ CVX/VAG CYTO-IMP: NORMAL
WBC # BLD AUTO: 8.6 X10E3/UL (ref 3.4–10.8)

## 2017-02-23 NOTE — PROGRESS NOTES
I saw and evaluated the patient, performing the key elements of the service. I discussed the findings, assessment and plan with the resident and agree with the resident's findings and plan as documented in the resident's note. Ultrasound was inconsistent with LMP. [facility] 9/26/17. Cont routine prenatal care. Agree with ultrasound findings per resident notation. Single viable IUP with pos cardiac activity. UTI in pregnancy. Complete abx course then return for [facility] for resolution.

## 2017-03-10 ENCOUNTER — ROUTINE PRENATAL (OUTPATIENT)
Dept: FAMILY MEDICINE CLINIC | Age: 32
End: 2017-03-10

## 2017-03-10 VITALS
HEART RATE: 59 BPM | TEMPERATURE: 98.1 F | BODY MASS INDEX: 29.38 KG/M2 | RESPIRATION RATE: 16 BRPM | SYSTOLIC BLOOD PRESSURE: 101 MMHG | DIASTOLIC BLOOD PRESSURE: 66 MMHG | HEIGHT: 63 IN | OXYGEN SATURATION: 100 % | WEIGHT: 165.8 LBS

## 2017-03-10 DIAGNOSIS — Z34.81 ENCOUNTER FOR SUPERVISION OF OTHER NORMAL PREGNANCY IN FIRST TRIMESTER: Primary | ICD-10-CM

## 2017-03-10 LAB
BILIRUB UR QL STRIP: NEGATIVE
GLUCOSE UR-MCNC: NEGATIVE MG/DL
KETONES P FAST UR STRIP-MCNC: NEGATIVE MG/DL
PH UR STRIP: 7 [PH] (ref 4.6–8)
PROT UR QL STRIP: NEGATIVE MG/DL
SP GR UR STRIP: 1.01 (ref 1–1.03)
UA UROBILINOGEN AMB POC: NORMAL (ref 0.2–1)
URINALYSIS CLARITY POC: CLEAR
URINALYSIS COLOR POC: YELLOW
URINE BLOOD POC: NEGATIVE
URINE LEUKOCYTES POC: NEGATIVE
URINE NITRITES POC: NEGATIVE

## 2017-03-10 NOTE — PROGRESS NOTES
Chief Complaint   Patient presents with    Routine Prenatal Visit           1. Have you been to the ER, urgent care clinic since your last visit? Hospitalized since your last visit? No    2. Have you seen or consulted any other health care providers outside of the [address] [place] [address] since your last visit? Include any pap smears or colon screening.  No

## 2017-03-10 NOTE — PROGRESS NOTES
Return OB Visit       Subjective:   Garrett Shah 32 y.o.  11w3d. GUERO: 2017, Based on last menstrual period of 2016 (Approximate)      Reports no VB, LOF or contractions. Immunizations- reviewed: There is no immunization history for the selected administration types on file for this patient. Objective:     Visit Vitals    /66    Pulse (!) 59    Temp 98.1 °F (36.7 °C) (Oral)    Resp 16    Ht 5' 3\" (1.6 m)    Wt 165 lb 12.8 oz (75.2 kg)    LMP 2016 (Approximate)    SpO2 100%    BMI 29.37 kg/m2       Physical Exam:  GENERAL APPEARANCE: NAD, pleasant  ABDOMEN: gravid, FHT present at 150 bpm  PSYCH: normal mood and affect    Assessment         ICD-10-CM ICD-9-CM    1. Encounter for supervision of other normal pregnancy in first trimester Z34.81  AMB POC URINALYSIS DIP STICK AUTO W/O MICRO      CULTURE, URINE         Plan   30yo  @ 11.3 by 9.0wk sono  1. IUP: IOB labs reviewed, RH pos, declines first trimester screening, noticed after pt gone that GC/CT never ran, will plan to add to urine at next visit, referral to [facility] for anatomy next visit   2. Hx UTI: s/p abx, will get JENNIFER today       Orders Placed This Encounter    CULTURE, URINE    AMB POC URINALYSIS DIP STICK AUTO W/O MICRO         Labor precautions discussed, including: Regular painful contractions, lasting for greater than one hour, taking your breath away; any vaginal bleeding; any leakage of fluid; or absent or decreased fetal movement. Call M.D. on call if any of these symptoms or signs occur. I have discussed the diagnosis with the patient and the intended plan as seen in the above orders. The patient has received an after-visit summary and questions were answered concerning future plans. I have discussed medication side effects and warnings with the patient as well.  Informed pt to return to the office or go to the ER if she experiences vaginal bleeding, vaginal discharge, leaking of fluid, pelvic cramping.       Santiago Guerrero, DO

## 2017-03-11 LAB — BACTERIA UR CULT: NO GROWTH

## 2017-04-06 ENCOUNTER — ROUTINE PRENATAL (OUTPATIENT)
Dept: FAMILY MEDICINE CLINIC | Age: 32
End: 2017-04-06

## 2017-04-06 VITALS
RESPIRATION RATE: 18 BRPM | BODY MASS INDEX: 29.8 KG/M2 | HEIGHT: 63 IN | OXYGEN SATURATION: 97 % | HEART RATE: 68 BPM | WEIGHT: 168.2 LBS | DIASTOLIC BLOOD PRESSURE: 69 MMHG | TEMPERATURE: 97.8 F | SYSTOLIC BLOOD PRESSURE: 107 MMHG

## 2017-04-06 DIAGNOSIS — Z3A.15 15 WEEKS GESTATION OF PREGNANCY: Primary | ICD-10-CM

## 2017-04-06 LAB
BILIRUB UR QL STRIP: NEGATIVE
GLUCOSE UR-MCNC: NEGATIVE MG/DL
KETONES P FAST UR STRIP-MCNC: NEGATIVE MG/DL
PH UR STRIP: 5.5 [PH] (ref 4.6–8)
PROT UR QL STRIP: NEGATIVE MG/DL
SP GR UR STRIP: 1.02 (ref 1–1.03)
UA UROBILINOGEN AMB POC: NORMAL (ref 0.2–1)
URINALYSIS CLARITY POC: CLEAR
URINALYSIS COLOR POC: YELLOW
URINE BLOOD POC: NEGATIVE
URINE LEUKOCYTES POC: NEGATIVE
URINE NITRITES POC: NEGATIVE

## 2017-04-06 NOTE — PROGRESS NOTES
Chief Complaint   Patient presents with    Routine Prenatal Visit     15w2d     1. Have you been to the ER, urgent care clinic since your last visit? Hospitalized since your last visit? No    2. Have you seen or consulted any other health care providers outside of the [address] [place] [address] since your last visit? Include any pap smears or colon screening. No    Elissa Shah denies any vaginal bleeding,discharge,or fluid leaking. Denies headaches and dizziness.

## 2017-04-06 NOTE — PROGRESS NOTES
Return OB Visit       Subjective:   Emilie Right Pablo 32 y.o.  . GUERO: 2017, Based on last menstrual period of 2016 (Approximate)      Reports no VB, LOF or contractions. Feeling well. Immunizations- reviewed: There is no immunization history for the selected administration types on file for this patient. Objective:     Visit Vitals    /69 (BP 1 Location: Left arm, BP Patient Position: Sitting)    Pulse 68    Temp 97.8 °F (36.6 °C)    Resp 18    Ht 5' 3\" (1.6 m)    Wt 168 lb 3.2 oz (76.3 kg)    LMP 2016 (Approximate)    SpO2 97%    BMI 29.8 kg/m2       Physical Exam:  GENERAL APPEARANCE: NAD, pleasant  ABDOMEN: gravid, FHT present at 150 bpm  PSYCH: normal mood and affect    Assessment         ICD-10-CM ICD-9-CM    1. 15 weeks gestation of pregnancy Z3A.15 V22.2 AMB POC URINALYSIS DIP STICK AUTO W/O MICRO      CHLAMYDIA / [address] 3826   64OR [id number] @ 15.2 by 9.0wk vasquezo  1. IUP: IOB labs reviewed, RH pos, declined 1st and 2nd tri screening, GC/CT added today as not done with IOB labs, M anatomy  at 1:30pm  2. Hx UTI: s/p abx, negative JENNIFER      Orders Placed This Encounter    CHLAMYDIA / GC AMPLIFICATION     Order Specific Question:   Sample type     Answer:   Urine [258]     Order Specific Question:   Specimen source     Answer:   Urine [258]    AMB POC URINALYSIS DIP STICK AUTO W/O MICRO         Labor precautions discussed, including: Regular painful contractions, lasting for greater than one hour, taking your breath away; any vaginal bleeding; any leakage of fluid; or absent or decreased fetal movement. Call M.D. on call if any of these symptoms or signs occur. I have discussed the diagnosis with the patient and the intended plan as seen in the above orders. The patient has received an after-visit summary and questions were answered concerning future plans.   I have discussed medication side effects and warnings with the patient as well. Informed pt to return to the office or go to the ER if she experiences vaginal bleeding, vaginal discharge, leaking of fluid, pelvic cramping.       Santiago Guerrero, DO

## 2017-04-09 LAB
C TRACH RRNA SPEC QL NAA+PROBE: NEGATIVE
CHLAMYDIA, EXTERNAL: NEGATIVE
N GONORRHOEA RRNA SPEC QL NAA+PROBE: NEGATIVE
N. GONORRHEA, EXTERNAL: NEGATIVE

## 2017-05-04 ENCOUNTER — ROUTINE PRENATAL (OUTPATIENT)
Dept: FAMILY MEDICINE CLINIC | Age: 32
End: 2017-05-04

## 2017-05-04 VITALS
SYSTOLIC BLOOD PRESSURE: 102 MMHG | BODY MASS INDEX: 30.83 KG/M2 | WEIGHT: 174 LBS | HEIGHT: 63 IN | OXYGEN SATURATION: 100 % | DIASTOLIC BLOOD PRESSURE: 67 MMHG | HEART RATE: 77 BPM | TEMPERATURE: 98.5 F | RESPIRATION RATE: 16 BRPM

## 2017-05-04 DIAGNOSIS — Z3A.19 19 WEEKS GESTATION OF PREGNANCY: Primary | ICD-10-CM

## 2017-05-04 NOTE — MR AVS SNAPSHOT
Visit Information Date & Time Provider Department Dept. Phone Encounter #  
 5/4/2017  1:15 PM Arabella Mohr DO [address] [phone] [id number] Your Appointments 6/6/2017  1:00 PM  
OB VISIT with Vince Galvan MD  
[facility] (Blanca Cortez) Appt Note: 24 weeks [address]  
[phone]  
  
   
 [address] [facility] [address] Upcoming Health Maintenance Date Due DTaP/Tdap/Td series (1 - Tdap) 4/28/2006 INFLUENZA AGE 9 TO ADULT 8/1/2017 PAP AKA CERVICAL CYTOLOGY 2/10/2020 Allergies as of 5/4/2017  Review Complete On: 5/4/2017 By: Reynaldo Lynch LPN No Known Allergies Current Immunizations  Never Reviewed No immunizations on file. Not reviewed this visit You Were Diagnosed With   
  
 Codes Comments 19 weeks gestation of pregnancy    -  Primary ICD-10-CM: Z3A.19 
ICD-9-CM: V22.2 Vitals BP Pulse Temp Resp Height(growth percentile) Weight(growth percentile) 102/67 (BP 1 Location: Left arm, BP Patient Position: Sitting) 77 98.5 °F (36.9 °C) (Oral) 16 5' 3\" (1.6 m) 174 lb (78.9 kg) LMP SpO2 BMI OB Status Smoking Status 12/05/2016 (Approximate) 100% 30.82 kg/m2 Pregnant Never Smoker Vitals History BMI and BSA Data Body Mass Index Body Surface Area  
 30.82 kg/m 2 1.87 m 2 Preferred Pharmacy Pharmacy Name Phone [facility] DR[facility] STORE 1 [address]  [facility] [phone] Your Updated Medication List  
  
   
This list is accurate as of: 5/4/17  1:41 PM.  Always use your most recent med list.  
  
  
  
  
 DAILY MULTI-VITAMINS/IRON tablet Generic drug:  multivitamin with iron Take 1 Tab by mouth daily. * norethindrone 0.35 mg Tab Commonly known as:  Magdaleno & Magdaleno Take 1 Tab by mouth daily. * norethindrone 0.35 mg Tab Commonly known as:  Magdaleno & Magdaleno Take 1 Tab by mouth daily. PRENATAL 19 (WITH DOCUSATE) 29-1 mg Tab Generic drug:  prenatal vit-fe fum-fa-dss Take  by mouth. * Notice: This list has 2 medication(s) that are the same as other medications prescribed for you. Read the directions carefully, and ask your doctor or other care provider to review them with you. We Performed the Following AMB POC URINALYSIS DIP STICK AUTO W/O MICRO [14673 CPT(R)] To-Do List   
 05/11/2017 1:30 PM  
  Appointment with ULTRASOUND 1 [facility] at [facility] ([phone]) Patient Instructions Learning About When to Call Your Doctor During Pregnancy (Up to 20 Weeks) Your Care Instructions It's common to have concerns about what might be a problem during pregnancy. Although most pregnant women don't have any serious problems, it's important to know when to call your doctor if you have certain symptoms. These are general suggestions. Your doctor may give you some more information about when to call. When to call your doctor (up to 20 weeks) Call 911 anytime you think you may need emergency care. For example, call if: 
· You passed out (lost consciousness). Call your doctor now or seek immediate medical care if: 
· You have a fever. · You have vaginal bleeding. · You are dizzy or lightheaded, or you feel like you may faint. · You have symptoms of a urinary tract infection. These may include: 
¨ Pain or burning when you urinate. ¨ A frequent need to urinate without being able to pass much urine. ¨ Pain in the flank, which is just below the rib cage and above the waist on either side of the back. ¨ Blood in your urine. · You have belly pain. · You think you are having contractions. · You have a sudden release of fluid from your vagina. Watch closely for changes in your health, and be sure to contact your doctor if: 
· You have vaginal discharge that smells bad. · You have other concerns about your pregnancy. Follow-up care is a key part of your treatment and safety. Be sure to make and go to all appointments, and call your doctor if you are having problems. It's also a good idea to know your test results and keep a list of the medicines you take. Where can you learn more? Go to http://christiana-divine.info/. Enter A113 in the search box to learn more about \"Learning About When to Call Your Doctor During Pregnancy (Up to 20 Weeks). \" 
Current as of: May 30, 2016 Content Version: 11.2 © 8394-8650 [employer]. Care instructions adapted under license by [employer] (which disclaims liability or warranty for this information). If you have questions about a medical condition or this instruction, always ask your healthcare professional. [address] any warranty or liability for your use of this information. Healthy Upper Back: Exercises Your Care Instructions Here are some examples of exercises for your upper back. Start each exercise slowly. Ease off the exercise if you start to have pain. Your doctor or physical therapist will tell you when you can start these exercises and which ones will work best for you. How to do the exercises Lower neck and upper back stretch 1. Stretch your arms out in front of your body. Clasp one hand on top of your other hand. 2. Gently reach out so that you feel your shoulder blades stretching away from each other. 3. Gently bend your head forward. 4. Hold for 15 to 30 seconds. 5. Repeat 2 to 4 times. Midback stretch Note: If you have knee pain, do not do this exercise. 1. Kneel on the floor, and sit back on your ankles. 2. Lean forward, place your hands on the floor, and stretch your arms out in front of you. Rest your head between your arms. 3. Gently push your chest toward the floor, reaching as far in front of you as possible. 4. Hold for 15 to 30 seconds. 5. Repeat 2 to 4 times. Shoulder rolls 1. Sit comfortably with your feet shoulder-width apart. You can also do this exercise while standing. 2. Roll your shoulders up, then back, and then down in a smooth, circular motion. 3. Repeat 2 to 4 times. Wall push-up 1. Stand against a wall with your feet about 12 to 24 inches back from the wall. If you feel any pain when you do this exercise, stand closer to the wall. 2. Place your hands on the wall slightly wider apart than your shoulders, and lean forward. 3. Gently lean your body toward the wall. Then push back to your starting position. Keep the motion smooth and controlled. 4. Repeat 8 to 12 times. Resisted shoulder blade squeeze Note: For this exercise, you will need elastic exercise material, such as surgical tubing or Thera-Band. 1. Sit or stand, holding the band in both hands in front of you. Keep your elbows close to your sides, bent at a 90-degree angle. Your palms should face up. 2. Squeeze your shoulder blades together, and move your arms to the outside, stretching the band. Be sure to keep your elbows at your sides while you do this. 3. Relax. 4. Repeat 8 to 12 times. Resisted rows Note: For this exercise, you will need elastic exercise material, such as surgical tubing or Thera-Band. 1. Put the band around a solid object, such as a bedpost, at about waist level. Hold one end of the band in each hand. 2. With your elbows at your sides and bent to 90 degrees, pull the band back to move your shoulder blades toward each other. Return to the starting position. 3. Repeat 8 to 12 times. Follow-up care is a key part of your treatment and safety. Be sure to make and go to all appointments, and call your doctor if you are having problems. It's also a good idea to know your test results and keep a list of the medicines you take. Where can you learn more? Go to http://christiana-divine.info/. Enter W679 in the search box to learn more about \"Healthy Upper Back: Exercises. \" Current as of: May 23, 2016 Content Version: 11.2 © 9837-3142 [employer]. Care instructions adapted under license by [employer] (which disclaims liability or warranty for this information). If you have questions about a medical condition or this instruction, always ask your healthcare professional. [address]  any warranty or liability for your use of this information. Introducing [facility] & HEALTH SERVICES! Claudia Franz introduces Bycler patient portal. Now you can access parts of your medical record, email your doctor's office, and request medication refills online. 1. In your internet browser, go to [email or web address] 2. Click on the First Time User? Click Here link in the Sign In box. You will see the New Member Sign Up page. 3. Enter your Bycler Access Code exactly as it appears below. You will not need to use this code after youve completed the sign-up process. If you do not sign up before the expiration date, you must request a new code. · Bycler Access Code: [email or web address] Expires: 8/2/2017  1:41 PM 
 
4. Enter the last four digits of your Social Security Number (xxxx) and Date of Birth (mm/dd/yyyy) as indicated and click Submit. You will be taken to the next sign-up page. 5. Create a Bycler ID. This will be your Bycler login ID and cannot be changed, so think of one that is secure and easy to remember. 6. Create a Bycler password. You can change your password at any time. 7. Enter your Password Reset Question and Answer. This can be used at a later time if you forget your password. 8. Enter your e-mail address. You will receive e-mail notification when new information is available in [address] 9. Click Sign Up. You can now view and download portions of your medical record. 10. Click the Download Summary menu link to download a portable copy of your medical information. If you have questions, please visit the Frequently Asked Questions section of the [employer] website. Remember, [employer] is NOT to be used for urgent needs. For medical emergencies, dial 911. Now available from your iPhone and Android! Please provide this summary of care documentation to your next provider. Your primary care clinician is listed as [address] [place] [address]. If you have any questions after today's visit, please call [phone].

## 2017-05-04 NOTE — PROGRESS NOTES
Return OB Visit       Subjective:   Adrianne Shah 28 y.o.  . GUERO: 2017, Based on last menstrual period of 2016 (Approximate)      Reports no VB, LOF or contractions. Feeling well. Immunizations- reviewed: There is no immunization history for the selected administration types on file for this patient. Objective:     Visit Vitals    /67 (BP 1 Location: Left arm, BP Patient Position: Sitting)    Pulse 77    Temp 98.5 °F (36.9 °C) (Oral)    Resp 16    Ht 5' 3\" (1.6 m)    Wt 174 lb (78.9 kg)    LMP 2016 (Approximate)    SpO2 100%    BMI 30.82 kg/m2       Physical Exam:  GENERAL APPEARANCE: NAD, pleasant  ABDOMEN: gravid, FH: FHT present at 150 bpm  PSYCH: normal mood and affect    Assessment         ICD-10-CM ICD-9-CM    1. 19 weeks gestation of pregnancy Z3A.19 V22.2 AMB POC URINALYSIS DIP STICK AUTO W/O MICRO         Plan   30yo [id number] @ 19.2 by 9.0wk elijah  1. IUP: IOB labs reviewed, RH pos, declined 1st and 2nd tri screening, MFM anatomy  at 1:30pm  2. Hx UTI: s/p abx, negative JENNIFER      Orders Placed This Encounter    AMB POC URINALYSIS DIP STICK AUTO W/O MICRO         Labor precautions discussed, including: Regular painful contractions, lasting for greater than one hour, taking your breath away; any vaginal bleeding; any leakage of fluid; or absent or decreased fetal movement. Call M.D. on call if any of these symptoms or signs occur. I have discussed the diagnosis with the patient and the intended plan as seen in the above orders. The patient has received an after-visit summary and questions were answered concerning future plans. I have discussed medication side effects and warnings with the patient as well. Informed pt to return to the office or go to the ER if she experiences vaginal bleeding, vaginal discharge, leaking of fluid, pelvic cramping.       Santiago Guerrero, DO

## 2017-05-04 NOTE — PATIENT INSTRUCTIONS
Learning About When to Call Your Doctor During Pregnancy (Up to 20 Weeks)  Your Care Instructions  It's common to have concerns about what might be a problem during pregnancy. Although most pregnant women don't have any serious problems, it's important to know when to call your doctor if you have certain symptoms. These are general suggestions. Your doctor may give you some more information about when to call. When to call your doctor (up to 20 weeks)  Call 911 anytime you think you may need emergency care. For example, call if:  · You passed out (lost consciousness). Call your doctor now or seek immediate medical care if:  · You have a fever. · You have vaginal bleeding. · You are dizzy or lightheaded, or you feel like you may faint. · You have symptoms of a urinary tract infection. These may include:  ¨ Pain or burning when you urinate. ¨ A frequent need to urinate without being able to pass much urine. ¨ Pain in the flank, which is just below the rib cage and above the waist on either side of the back. ¨ Blood in your urine. · You have belly pain. · You think you are having contractions. · You have a sudden release of fluid from your vagina. Watch closely for changes in your health, and be sure to contact your doctor if:  · You have vaginal discharge that smells bad. · You have other concerns about your pregnancy. Follow-up care is a key part of your treatment and safety. Be sure to make and go to all appointments, and call your doctor if you are having problems. It's also a good idea to know your test results and keep a list of the medicines you take. Where can you learn more? Go to http://christiana-divine.info/. Enter R144 in the search box to learn more about \"Learning About When to Call Your Doctor During Pregnancy (Up to 20 Weeks). \"  Current as of: May 30, 2016  Content Version: 11.2  © 6922-5247 [employer], [employer].  Care instructions adapted under license by [facility] (which disclaims liability or warranty for this information). If you have questions about a medical condition or this instruction, always ask your healthcare professional. [address] any warranty or liability for your use of this information. Healthy Upper Back: Exercises  Your Care Instructions  Here are some examples of exercises for your upper back. Start each exercise slowly. Ease off the exercise if you start to have pain. Your doctor or physical therapist will tell you when you can start these exercises and which ones will work best for you. How to do the exercises  Lower neck and upper back stretch    1. Stretch your arms out in front of your body. Clasp one hand on top of your other hand. 2. Gently reach out so that you feel your shoulder blades stretching away from each other. 3. Gently bend your head forward. 4. Hold for 15 to 30 seconds. 5. Repeat 2 to 4 times. Midback stretch    Note: If you have knee pain, do not do this exercise. 1. Kneel on the floor, and sit back on your ankles. 2. Lean forward, place your hands on the floor, and stretch your arms out in front of you. Rest your head between your arms. 3. Gently push your chest toward the floor, reaching as far in front of you as possible. 4. Hold for 15 to 30 seconds. 5. Repeat 2 to 4 times. Shoulder rolls    1. Sit comfortably with your feet shoulder-width apart. You can also do this exercise while standing. 2. Roll your shoulders up, then back, and then down in a smooth, circular motion. 3. Repeat 2 to 4 times. Wall push-up    1. Stand against a wall with your feet about 12 to 24 inches back from the wall. If you feel any pain when you do this exercise, stand closer to the wall. 2. Place your hands on the wall slightly wider apart than your shoulders, and lean forward. 3. Gently lean your body toward the wall. Then push back to your starting position.  Keep the motion smooth and controlled. 4. Repeat 8 to 12 times. Resisted shoulder blade squeeze    Note: For this exercise, you will need elastic exercise material, such as surgical tubing or Thera-Band. 1. Sit or stand, holding the band in both hands in front of you. Keep your elbows close to your sides, bent at a 90-degree angle. Your palms should face up. 2. Squeeze your shoulder blades together, and move your arms to the outside, stretching the band. Be sure to keep your elbows at your sides while you do this. 3. Relax. 4. Repeat 8 to 12 times. Resisted rows    Note: For this exercise, you will need elastic exercise material, such as surgical tubing or Thera-Band. 1. Put the band around a solid object, such as a bedpost, at about waist level. Hold one end of the band in each hand. 2. With your elbows at your sides and bent to 90 degrees, pull the band back to move your shoulder blades toward each other. Return to the starting position. 3. Repeat 8 to 12 times. Follow-up care is a key part of your treatment and safety. Be sure to make and go to all appointments, and call your doctor if you are having problems. It's also a good idea to know your test results and keep a list of the medicines you take. Where can you learn more? Go to http://christiana-divine.info/. Enter H029 in the search box to learn more about \"Healthy Upper Back: Exercises. \"  Current as of: May 23, 2016  Content Version: 11.2  © 6219-8773 [employer]. Care instructions adapted under license by [employer] (which disclaims liability or warranty for this information). If you have questions about a medical condition or this instruction, always ask your healthcare professional. [address] any warranty or liability for your use of this information.

## 2017-05-04 NOTE — PROGRESS NOTES
Chief Complaint   Patient presents with    Routine Prenatal Visit     19 weeks     1. Have you been to the ER, urgent care clinic since your last visit? Hospitalized since your last visit? No    2. Have you seen or consulted any other health care providers outside of the [address] [place] [address] since your last visit? Include any pap smears or colon screening.  No

## 2017-05-11 ENCOUNTER — HOSPITAL ENCOUNTER (OUTPATIENT)
Dept: PERINATAL CARE | Age: 32
Discharge: HOME | End: 2017-05-11
Attending: OBSTETRICS & GYNECOLOGY
Payer: MEDICAID

## 2017-05-11 PROCEDURE — 76805 OB US >/= 14 WKS SNGL FETUS: CPT | Performed by: OBSTETRICS & GYNECOLOGY

## 2017-06-06 ENCOUNTER — ROUTINE PRENATAL (OUTPATIENT)
Dept: FAMILY MEDICINE CLINIC | Age: 32
End: 2017-06-06

## 2017-06-06 VITALS
BODY MASS INDEX: 32.43 KG/M2 | SYSTOLIC BLOOD PRESSURE: 108 MMHG | WEIGHT: 183 LBS | OXYGEN SATURATION: 96 % | RESPIRATION RATE: 16 BRPM | DIASTOLIC BLOOD PRESSURE: 68 MMHG | TEMPERATURE: 98.2 F | HEART RATE: 73 BPM | HEIGHT: 63 IN

## 2017-06-06 DIAGNOSIS — Z3A.24 24 WEEKS GESTATION OF PREGNANCY: Primary | ICD-10-CM

## 2017-06-06 LAB
BILIRUB UR QL STRIP: NEGATIVE
GLUCOSE UR-MCNC: NEGATIVE MG/DL
KETONES P FAST UR STRIP-MCNC: NORMAL MG/DL
PH UR STRIP: 7 [PH] (ref 4.6–8)
PROT UR QL STRIP: NORMAL MG/DL
SP GR UR STRIP: 1.02 (ref 1–1.03)
UA UROBILINOGEN AMB POC: NORMAL (ref 0.2–1)
URINALYSIS CLARITY POC: CLEAR
URINALYSIS COLOR POC: YELLOW
URINE BLOOD POC: NEGATIVE
URINE LEUKOCYTES POC: NEGATIVE
URINE NITRITES POC: NEGATIVE

## 2017-06-06 NOTE — MR AVS SNAPSHOT
Visit Information Date & Time Provider Department Dept. Phone Encounter #  
 6/6/2017  1:00 PM Deann Ballard, [facility] [phone] [id number] Follow-up Instructions Return in about 4 weeks (around 7/4/2017). Follow-up and Disposition History Upcoming Health Maintenance Date Due DTaP/Tdap/Td series (1 - Tdap) 4/28/2006 INFLUENZA AGE 9 TO ADULT 8/1/2017 PAP AKA CERVICAL CYTOLOGY 2/10/2020 Allergies as of 6/6/2017  Review Complete On: 6/6/2017 By: Deann Ballard MD  
 No Known Allergies Current Immunizations  Never Reviewed No immunizations on file. Not reviewed this visit You Were Diagnosed With   
  
 Codes Comments 24 weeks gestation of pregnancy    -  Primary ICD-10-CM: Z3A.24 
ICD-9-CM: V22.2 Vitals BP Pulse Temp Resp Height(growth percentile) Weight(growth percentile) 108/68 (BP 1 Location: Left arm, BP Patient Position: Sitting) 73 98.2 °F (36.8 °C) (Oral) 16 5' 3\" (1.6 m) 183 lb (83 kg) LMP SpO2 BMI OB Status Smoking Status 12/05/2016 (Approximate) 96% 32.42 kg/m2 Pregnant Never Smoker Vitals History BMI and BSA Data Body Mass Index Body Surface Area  
 32.42 kg/m 2 1.92 m 2 Preferred Pharmacy Pharmacy Name Phone [facility] DRUG STORE 1 [address]  [facility] [phone] Your Updated Medication List  
  
   
This list is accurate as of: 6/6/17  4:37 PM.  Always use your most recent med list.  
  
  
  
  
 PRENATAL 19 (WITH DOCUSATE) 29-1 mg Tab Generic drug:  prenatal vit-fe fum-fa-dss Take  by mouth. We Performed the Following AMB POC URINALYSIS DIP STICK AUTO W/O MICRO [43744 CPT(R)] Follow-up Instructions Return in about 4 weeks (around 7/4/2017). Introducing [facility] & [facility]!    
 [employer] introduces Xango.com patient portal. Now you can access parts of your medical record, email your doctor's office, and request medication refills online. 1. In your internet browser, go to [email or web address] 2. Click on the First Time User? Click Here link in the Sign In box. You will see the New Member Sign Up page. 3. Enter your Pictour.us Access Code exactly as it appears below. You will not need to use this code after youve completed the sign-up process. If you do not sign up before the expiration date, you must request a new code. · [email or web address].us Access Code: [email or web address] Expires: 8/2/2017  1:41 PM 
 
4. Enter the last four digits of your Social Security Number (xxxx) and Date of Birth (mm/dd/yyyy) as indicated and click Submit. You will be taken to the next sign-up page. 5. Create a Pictour.us ID. This will be your Pictour.us login ID and cannot be changed, so think of one that is secure and easy to remember. 6. Create a Pictour.us password. You can change your password at any time. 7. Enter your Password Reset Question and Answer. This can be used at a later time if you forget your password. 8. Enter your e-mail address. You will receive e-mail notification when new information is available in [address] 9. Click Sign Up. You can now view and download portions of your medical record. 10. Click the Download Summary menu link to download a portable copy of your medical information. If you have questions, please visit the Frequently Asked Questions section of the [email or web address] website. Remember, [employer]ict[employer] is NOT to be used for urgent needs. For medical emergencies, dial 911. Now available from your iPhone and Android! Please provide this summary of care documentation to your next provider. Your primary care clinician is listed as [facility], . If you have any questions after today's visit, please call [phone].

## 2017-06-06 NOTE — PROGRESS NOTES
Presents for prenatal care visit  Has no complaints     -- all previous vaginal deliveries    Anatomy scan:  Posterior placenta  Male  F/U as clinically indicated    Denies bleeding or LOF    + fetal movement    O positive

## 2017-07-11 ENCOUNTER — ROUTINE PRENATAL (OUTPATIENT)
Dept: FAMILY MEDICINE CLINIC | Age: 32
End: 2017-07-11

## 2017-07-11 VITALS
HEART RATE: 80 BPM | RESPIRATION RATE: 16 BRPM | TEMPERATURE: 97.6 F | DIASTOLIC BLOOD PRESSURE: 65 MMHG | BODY MASS INDEX: 32.6 KG/M2 | SYSTOLIC BLOOD PRESSURE: 107 MMHG | WEIGHT: 184 LBS | HEIGHT: 63 IN | OXYGEN SATURATION: 96 %

## 2017-07-11 DIAGNOSIS — Z34.83 NORMAL PREGNANCY IN MULTIGRAVIDA IN THIRD TRIMESTER: ICD-10-CM

## 2017-07-11 DIAGNOSIS — Z3A.29 29 WEEKS GESTATION OF PREGNANCY: Primary | ICD-10-CM

## 2017-07-11 LAB
BILIRUB UR QL STRIP: NEGATIVE
GLUCOSE UR-MCNC: NEGATIVE MG/DL
KETONES P FAST UR STRIP-MCNC: NEGATIVE MG/DL
PH UR STRIP: 6.5 [PH] (ref 4.6–8)
PROT UR QL STRIP: NEGATIVE MG/DL
SP GR UR STRIP: 1.02 (ref 1–1.03)
UA UROBILINOGEN AMB POC: NORMAL (ref 0.2–1)
URINALYSIS CLARITY POC: CLEAR
URINALYSIS COLOR POC: NORMAL
URINE BLOOD POC: NORMAL
URINE LEUKOCYTES POC: NEGATIVE
URINE NITRITES POC: NEGATIVE

## 2017-07-11 NOTE — PROGRESS NOTES
Present for prenatal care visit  Has no complaints    + fetal movement    Denies bleeding or LOF    Doing her one hour glucola today     -- all previous vaginal deliveries -- two girls, last one was a boy    Blood type:  O positive

## 2017-07-11 NOTE — LETTER
7/21/2017 10:13 AM 
 
Ms. Masha [facility] [address] [place] [phone] [facility]0 [facility]: 
 
Please find your most recent results below. Resulted Orders GLUCOSE, GESTATIONAL, 1 HR TOLERANCE Result Value Ref Range Gestational Diabetes Screen 103 65 - 139 mg/dL Comment: According to ADA, a glucose threshold of >139 mg/dL after 50-gram 
load identifies approximately 80% of women with gestational 
diabetes mellitus, while the sensitivity is further increased to 
approximately 90% by a threshold of >129 mg/dL. Narrative Performed at:  [address]  [id number] : Gerald Taveras MD, Phone:  [phone] AMB POC URINALYSIS DIP STICK AUTO W/O MICRO Result Value Ref Range Color (UA POC) CIT Group Clarity (UA POC) Clear Glucose (UA POC) Negative Negative Bilirubin (UA POC) Negative Negative Ketones (UA POC) Negative Negative Specific gravity (UA POC) 1.025 1.001 - 1.035 Blood (UA POC) Trace Negative pH (UA POC) 6.5 4.6 - 8.0 Protein (UA POC) Negative Negative mg/dL Urobilinogen (UA POC) 0.2 mg/dL 0.2 - 1 Nitrites (UA POC) Negative Negative Leukocyte esterase (UA POC) Negative Negative HGB & HCT Result Value Ref Range HGB 11.1 11.1 - 15.9 g/dL HCT 33.7 (L) 34.0 - 46.6 % Narrative Performed at:  [address]  [id number] : Gerald Taveras MD, Phone:  [phone] RECOMMENDATIONS: 
Normal one hour glucola Hemoglobin and hematocrit ok Pregnant Please call me if you have any questions: [phone] Sincerely, Ab Mcdonald MD

## 2017-07-12 LAB
GLUCOSE 1H P 50 G GLC PO SERPL-MCNC: 103 MG/DL (ref 65–139)
HCT VFR BLD AUTO: 33.7 % (ref 34–46.6)
HGB BLD-MCNC: 11.1 G/DL (ref 11.1–15.9)

## 2017-07-25 ENCOUNTER — ROUTINE PRENATAL (OUTPATIENT)
Dept: FAMILY MEDICINE CLINIC | Age: 32
End: 2017-07-25

## 2017-07-25 VITALS
SYSTOLIC BLOOD PRESSURE: 98 MMHG | BODY MASS INDEX: 32.6 KG/M2 | HEIGHT: 63 IN | WEIGHT: 184 LBS | HEART RATE: 78 BPM | DIASTOLIC BLOOD PRESSURE: 57 MMHG | TEMPERATURE: 98.2 F | RESPIRATION RATE: 16 BRPM | OXYGEN SATURATION: 97 %

## 2017-07-25 DIAGNOSIS — Z34.83 NORMAL PREGNANCY IN MULTIGRAVIDA IN THIRD TRIMESTER: Primary | ICD-10-CM

## 2017-07-25 DIAGNOSIS — Z34.93 PRENATAL CARE, THIRD TRIMESTER: ICD-10-CM

## 2017-07-25 LAB
BILIRUB UR QL STRIP: NEGATIVE
GLUCOSE UR-MCNC: NEGATIVE MG/DL
KETONES P FAST UR STRIP-MCNC: NEGATIVE MG/DL
PH UR STRIP: 6.5 [PH] (ref 4.6–8)
PROT UR QL STRIP: NORMAL MG/DL
SP GR UR STRIP: 1.03 (ref 1–1.03)
UA UROBILINOGEN AMB POC: NORMAL (ref 0.2–1)
URINALYSIS CLARITY POC: CLEAR
URINALYSIS COLOR POC: YELLOW
URINE BLOOD POC: NEGATIVE
URINE LEUKOCYTES POC: NEGATIVE
URINE NITRITES POC: NEGATIVE

## 2017-07-25 NOTE — PROGRESS NOTES
Presents for prenatal care visit    States that she went to the beach last week, did a lot of walking, states she was having contractions, had brown discharge, now all resolved    States that she is drinking plenty of water    Denies bleeding or LOF    + fetal movement    Having a boy    One hour glucola normal        Taking her prenatal vitamin

## 2017-07-25 NOTE — PROGRESS NOTES
Chief Complaint   Patient presents with    Routine Prenatal Visit     31 weeks     1. Have you been to the ER, urgent care clinic since your last visit? Hospitalized since your last visit? No    2. Have you seen or consulted any other health care providers outside of the [address] [place] [address] since your last visit? Include any pap smears or colon screening. No    No complications with past pregnancies    Passed plug--pink mucous--not a lot---passed on Thursday night    Can feel the baby move    Vaginal deliveries.

## 2017-08-09 ENCOUNTER — ROUTINE PRENATAL (OUTPATIENT)
Dept: FAMILY MEDICINE CLINIC | Age: 32
End: 2017-08-09

## 2017-08-09 VITALS
BODY MASS INDEX: 33.49 KG/M2 | WEIGHT: 189 LBS | HEART RATE: 76 BPM | TEMPERATURE: 97.6 F | HEIGHT: 63 IN | OXYGEN SATURATION: 99 % | SYSTOLIC BLOOD PRESSURE: 106 MMHG | RESPIRATION RATE: 18 BRPM | DIASTOLIC BLOOD PRESSURE: 70 MMHG

## 2017-08-09 DIAGNOSIS — Z3A.33 33 WEEKS GESTATION OF PREGNANCY: Primary | ICD-10-CM

## 2017-08-09 DIAGNOSIS — Z34.83 NORMAL PREGNANCY IN MULTIGRAVIDA IN THIRD TRIMESTER: ICD-10-CM

## 2017-08-09 LAB
BILIRUB UR QL STRIP: NEGATIVE
GLUCOSE UR-MCNC: NEGATIVE MG/DL
KETONES P FAST UR STRIP-MCNC: NORMAL MG/DL
PH UR STRIP: 6 [PH] (ref 4.6–8)
PROT UR QL STRIP: NEGATIVE MG/DL
SP GR UR STRIP: 1.02 (ref 1–1.03)
UA UROBILINOGEN AMB POC: NORMAL (ref 0.2–1)
URINALYSIS CLARITY POC: CLEAR
URINALYSIS COLOR POC: YELLOW
URINE BLOOD POC: NEGATIVE
URINE LEUKOCYTES POC: NEGATIVE
URINE NITRITES POC: NEGATIVE

## 2017-08-09 NOTE — PROGRESS NOTES
Chief Complaint   Patient presents with    Routine Prenatal Visit     33w1d     1. Have you been to the ER, urgent care clinic since your last visit? Hospitalized since your last visit? No    2. Have you seen or consulted any other health care providers outside of the [address] [place] [address] since your last visit? Include any pap smears or colon screening.  No

## 2017-08-09 NOTE — PROGRESS NOTES
Presents for prenatal care visit  Has no complaints    + fetal movement    Denies bleeding or LOF    Labor precautions given

## 2017-08-09 NOTE — MR AVS SNAPSHOT
Visit Information Date & Time Provider Department Dept. Phone Encounter #  
 8/9/2017  2:50 PM Tiara Barahona, [facility] [phone] [id number] Upcoming Health Maintenance Date Due INFLUENZA AGE 9 TO ADULT 8/1/2017 PAP AKA CERVICAL CYTOLOGY 2/10/2020 DTaP/Tdap/Td series (2 - Td) 7/11/2027 Allergies as of 8/9/2017  Review Complete On: 8/9/2017 By: Amee Ortiz LPN No Known Allergies Current Immunizations  Never Reviewed Name Date Tdap 7/11/2017 Not reviewed this visit You Were Diagnosed With   
  
 Codes Comments 33 weeks gestation of pregnancy    -  Primary ICD-10-CM: Z3A.33 
ICD-9-CM: V22.2 Vitals BP Pulse Temp Resp Height(growth percentile) Weight(growth percentile) 106/70 (BP 1 Location: Right arm, BP Patient Position: Sitting) 76 97.6 °F (36.4 °C) (Oral) 18 5' 3\" (1.6 m) 189 lb (85.7 kg) LMP SpO2 BMI OB Status Smoking Status 12/05/2016 (Approximate) 99% 33.48 kg/m2 Pregnant Never Smoker Vitals History BMI and BSA Data Body Mass Index Body Surface Area  
 33.48 kg/m 2 1.95 m 2 Preferred Pharmacy Pharmacy Name Phone [facility] 1 [address]  [facility] [phone] Your Updated Medication List  
  
   
This list is accurate as of: 8/9/17  4:38 PM.  Always use your most recent med list.  
  
  
  
  
 PRENATAL 19 (WITH DOCUSATE) 29-1 mg Tab Generic drug:  prenatal vit-fe fum-fa-dss Take  by mouth. We Performed the Following AMB POC URINALYSIS DIP STICK AUTO W/O MICRO [25176 CPT(R)] Introducing [address]! Geneva Lynch introduces Canonical patient portal. Now you can access parts of your medical record, email your doctor's office, and request medication refills online. 1. In your internet browser, go to [email or web address] 2. Click on the First Time User? Click Here link in the Sign In box. You will see the New Member Sign Up page. 3. Enter your LYCEEM Access Code exactly as it appears below. You will not need to use this code after youve completed the sign-up process. If you do not sign up before the expiration date, you must request a new code. · LYCEEM Access Code: [facility] Expires: 11/7/2017  4:38 PM 
 
4. Enter the last four digits of your Social Security Number (xxxx) and Date of Birth (mm/dd/yyyy) as indicated and click Submit. You will be taken to the next sign-up page. 5. Create a LYCEEM ID. This will be your LYCEEM login ID and cannot be changed, so think of one that is secure and easy to remember. 6. Create a LYCEEM password. You can change your password at any time. 7. Enter your Password Reset Question and Answer. This can be used at a later time if you forget your password. 8. Enter your e-mail address. You will receive e-mail notification when new information is available in [address] 9. Click Sign Up. You can now view and download portions of your medical record. 10. Click the Download Summary menu link to download a portable copy of your medical information. If you have questions, please visit the Frequently Asked Questions section of the LYCEEM website. Remember, LYCEEM is NOT to be used for urgent needs. For medical emergencies, dial 911. Now available from your iPhone and Android! Please provide this summary of care documentation to your next provider. Your primary care clinician is listed as [facility]. If you have any questions after today's visit, please call [phone].

## 2017-08-29 ENCOUNTER — ROUTINE PRENATAL (OUTPATIENT)
Dept: FAMILY MEDICINE CLINIC | Age: 32
End: 2017-08-29

## 2017-08-29 VITALS
RESPIRATION RATE: 16 BRPM | HEIGHT: 63 IN | BODY MASS INDEX: 34.02 KG/M2 | WEIGHT: 192 LBS | OXYGEN SATURATION: 98 % | TEMPERATURE: 98.6 F | DIASTOLIC BLOOD PRESSURE: 73 MMHG | SYSTOLIC BLOOD PRESSURE: 113 MMHG | HEART RATE: 75 BPM

## 2017-08-29 DIAGNOSIS — Z3A.36 36 WEEKS GESTATION OF PREGNANCY: Primary | ICD-10-CM

## 2017-08-29 LAB
BILIRUB UR QL STRIP: NEGATIVE
GLUCOSE UR-MCNC: NORMAL MG/DL
GRBS, EXTERNAL: POSITIVE
KETONES P FAST UR STRIP-MCNC: NEGATIVE MG/DL
PH UR STRIP: 6.5 [PH] (ref 4.6–8)
PROT UR QL STRIP: NEGATIVE MG/DL
SP GR UR STRIP: 1.02 (ref 1–1.03)
UA UROBILINOGEN AMB POC: NORMAL (ref 0.2–1)
URINALYSIS CLARITY POC: NORMAL
URINALYSIS COLOR POC: NORMAL
URINE BLOOD POC: NEGATIVE
URINE LEUKOCYTES POC: NEGATIVE
URINE NITRITES POC: NEGATIVE

## 2017-08-29 NOTE — PROGRESS NOTES
Presents for prenatal care visit  Denies bleeding or LOF  + fetal movement    GBS cx done today    States taking her prenatal vitamins    States that she usually goes to 40 weeks

## 2017-08-31 LAB — GP B STREP DNA SPEC QL NAA+PROBE: POSITIVE

## 2017-09-05 ENCOUNTER — ROUTINE PRENATAL (OUTPATIENT)
Dept: FAMILY MEDICINE CLINIC | Age: 32
End: 2017-09-05

## 2017-09-05 VITALS
HEART RATE: 82 BPM | WEIGHT: 193.6 LBS | BODY MASS INDEX: 34.3 KG/M2 | RESPIRATION RATE: 17 BRPM | OXYGEN SATURATION: 97 % | DIASTOLIC BLOOD PRESSURE: 67 MMHG | HEIGHT: 63 IN | TEMPERATURE: 98.4 F | SYSTOLIC BLOOD PRESSURE: 108 MMHG

## 2017-09-05 DIAGNOSIS — Z34.93 ENCOUNTER FOR SUPERVISION OF NORMAL PREGNANCY IN THIRD TRIMESTER, UNSPECIFIED GRAVIDITY: Primary | ICD-10-CM

## 2017-09-05 DIAGNOSIS — B95.1 POSITIVE GBS TEST: ICD-10-CM

## 2017-09-05 LAB
BILIRUB UR QL STRIP: NEGATIVE
GLUCOSE UR-MCNC: NEGATIVE MG/DL
KETONES P FAST UR STRIP-MCNC: NEGATIVE MG/DL
PH UR STRIP: 6 [PH] (ref 4.6–8)
PROT UR QL STRIP: NEGATIVE MG/DL
SP GR UR STRIP: 1.01 (ref 1–1.03)
UA UROBILINOGEN AMB POC: NORMAL (ref 0.2–1)
URINALYSIS CLARITY POC: CLEAR
URINALYSIS COLOR POC: YELLOW
URINE BLOOD POC: NEGATIVE
URINE LEUKOCYTES POC: NEGATIVE
URINE NITRITES POC: NEGATIVE

## 2017-09-05 NOTE — PATIENT INSTRUCTIONS
Week 37 of Your Pregnancy: Care Instructions  Your Care Instructions    You are near the end of your pregnancy--and you're probably pretty uncomfortable. It may be harder to walk around. Lying down probably isn't comfortable either. You may have trouble getting to sleep or staying asleep. Most women deliver their babies between 40 and 41 weeks. This is a good time to think about packing a bag for the hospital with items you'll need. Then you'll be ready when labor starts. Follow-up care is a key part of your treatment and safety. Be sure to make and go to all appointments, and call your doctor if you are having problems. It's also a good idea to know your test results and keep a list of the medicines you take. How can you care for yourself at home? Learn about breastfeeding  · Breastfeeding is best for your baby and good for you. · Breast milk has antibodies to help your baby fight infections. · Mothers who breastfeed often lose weight faster, because making milk burns calories. · Learning the best ways to hold your baby will make breastfeeding easier. · Let your partner bathe and diaper the baby to keep your partner from feeling left out. Snuggle together when you breastfeed. · You may want to learn how to use a breast pump and store your milk. · If you choose to bottle feed, make the feeding feel like breastfeeding so you can bond with your baby. Always hold your baby and the bottle. Do not prop bottles or let your baby fall asleep with a bottle. Learn about crying  · It is common for babies to cry for 1 to 3 hours a day. Some cry more, some cry less. · Babies don't cry to make you upset or because you are a bad parent. · Crying is how your baby communicates. Your baby may be hungry; have gas; need a diaper change; or feel cold, warm, tired, lonely, or tense. Sometimes babies cry for unknown reasons. · If you respond to your baby's needs, he or she will learn to trust you.   · Try to stay calm when your baby cries. Your baby may get more upset if he or she senses that you are upset. Know how to care for your   · Your baby's umbilical cord stump will drop off on its own, usually between 1 and 2 weeks. To care for your baby's umbilical cord area:  ¨ Clean the area at the bottom of the cord 2 or 3 times a day. ¨ Pay special attention to the area where the cord attaches to the skin. ¨ Keep the diaper folded below the cord. ¨ Use a damp washcloth or cotton ball to sponge bathe your baby until the stump has come off. · Your baby's first dark stool is called meconium. After the meconium is passed, your baby will develop his or her own bowel pattern. ¨ Some babies, especially  babies, have several bowel movements a day. Others have one or two a day, or one every 2 to 3 days. ¨  babies often have loose, yellow stools. Formula-fed babies have more formed stools. ¨ If your baby's stools look like little pellets, he or she is constipated. After 2 days of constipation, call your baby's doctor. · If your baby will be circumcised, you can care for him at home. ¨ Gently rinse his penis with warm water after every diaper change. Do not try to remove the film that forms on the penis. This film will go away on its own. Pat dry. ¨ Put petroleum ointment, such as Vaseline, on the area of the diaper that will touch your baby's penis. This will keep the diaper from sticking to your baby. ¨ Ask the doctor about giving your baby acetaminophen (Tylenol) for pain. Where can you learn more? Go to http://christiana-divine.info/. Enter 68 21 97 in the search box to learn more about \"Week 37 of Your Pregnancy: Care Instructions. \"  Current as of: 2017  Content Version: 11.3  © 0037-6832 iZettle. Care instructions adapted under license by Proven (which disclaims liability or warranty for this information).  If you have questions about a medical condition or this instruction, always ask your healthcare professional. [place] [address] any warranty or liability for your use of this information.

## 2017-09-05 NOTE — MR AVS SNAPSHOT
Visit Information Date & Time Provider Department Dept. Phone Encounter #  
 2017  2:00 PM Donald Hernandes MD [address] [facility] [phone] [id number] Your Appointments 2017  2:00 PM  
OB VISIT with Donald Hernandes MD  
[address] [facility]) Appt Note: 38wks [address] [facility] [address] [facility]  
[phone]  
  
   
 [address] [facility] [address] [place] [address] Upcoming Health Maintenance Date Due INFLUENZA AGE 9 TO ADULT 2017 PAP AKA CERVICAL CYTOLOGY 2/10/2020 DTaP/Tdap/Td series (2 - Td) 2027 Allergies as of 2017  Review Complete On: 2017 By: Yuniel Montiel LPN No Known Allergies Current Immunizations  Reviewed on 2017 Name Date Tdap 2017 Reviewed by Donald Hernandes MD on 2017 at  2:09 PM  
You Were Diagnosed With   
  
 Codes Comments Encounter for supervision of normal pregnancy in third trimester, unspecified     -  Primary ICD-10-CM: Z34.93 
ICD-9-CM: V22.1 Positive GBS test     ICD-10-CM: B95.1 ICD-9-CM: 041.02 Vitals BP Pulse Temp Resp Height(growth percentile) Weight(growth percentile) 108/67 (BP 1 Location: Left arm, BP Patient Position: Sitting) 82 98.4 °F (36.9 °C) (Oral) 17 5' 3\" (1.6 m) 193 lb 9.6 oz (87.8 kg) LMP SpO2 BMI OB Status Smoking Status 2016 (Approximate) 97% 34.29 kg/m2 Pregnant Never Smoker Vitals History BMI and BSA Data Body Mass Index Body Surface Area  
 34.29 kg/m 2 1.98 m 2 Preferred Pharmacy Pharmacy Name Phone [facility] 1 [address]  [facility] [phone] Your Updated Medication List  
  
   
This list is accurate as of: 17  2:35 PM.  Always use your most recent med list.  
  
  
  
  
 PRENATAL 19 (WITH DOCUSATE) 29-1 mg Tab Generic drug:  prenatal vit-fe fum-fa-dss Take  by mouth. We Performed the Following AMB POC URINALYSIS DIP STICK AUTO W/O MICRO [49639 CPT(R)] Patient Instructions Week 37 of Your Pregnancy: Care Instructions Your Care Instructions You are near the end of your pregnancyand you're probably pretty uncomfortable. It may be harder to walk around. Lying down probably isn't comfortable either. You may have trouble getting to sleep or staying asleep. Most women deliver their babies between 40 and 41 weeks. This is a good time to think about packing a bag for the hospital with items you'll need. Then you'll be ready when labor starts. Follow-up care is a key part of your treatment and safety. Be sure to make and go to all appointments, and call your doctor if you are having problems. It's also a good idea to know your test results and keep a list of the medicines you take. How can you care for yourself at home? Learn about breastfeeding · Breastfeeding is best for your baby and good for you. · Breast milk has antibodies to help your baby fight infections. · Mothers who breastfeed often lose weight faster, because making milk burns calories. · Learning the best ways to hold your baby will make breastfeeding easier. · Let your partner bathe and diaper the baby to keep your partner from feeling left out. Snuggle together when you breastfeed. · You may want to learn how to use a breast pump and store your milk. · If you choose to bottle feed, make the feeding feel like breastfeeding so you can bond with your baby. Always hold your baby and the bottle. Do not prop bottles or let your baby fall asleep with a bottle. Learn about crying · It is common for babies to cry for 1 to 3 hours a day. Some cry more, some cry less. · Babies don't cry to make you upset or because you are a bad parent. · Crying is how your baby communicates.  Your baby may be hungry; have gas; need a diaper change; or feel cold, warm, tired, lonely, or tense. Sometimes babies cry for unknown reasons. · If you respond to your baby's needs, he or she will learn to trust you. · Try to stay calm when your baby cries. Your baby may get more upset if he or she senses that you are upset. Know how to care for your  · Your baby's umbilical cord stump will drop off on its own, usually between 1 and 2 weeks. To care for your baby's umbilical cord area: ¨ Clean the area at the bottom of the cord 2 or 3 times a day. ¨ Pay special attention to the area where the cord attaches to the skin. ¨ Keep the diaper folded below the cord. ¨ Use a damp washcloth or cotton ball to sponge bathe your baby until the stump has come off. · Your baby's first dark stool is called meconium. After the meconium is passed, your baby will develop his or her own bowel pattern. ¨ Some babies, especially  babies, have several bowel movements a day. Others have one or two a day, or one every 2 to 3 days. ¨  babies often have loose, yellow stools. Formula-fed babies have more formed stools. ¨ If your baby's stools look like little pellets, he or she is constipated. After 2 days of constipation, call your baby's doctor. · If your baby will be circumcised, you can care for him at home. ¨ Gently rinse his penis with warm water after every diaper change. Do not try to remove the film that forms on the penis. This film will go away on its own. Pat dry. ¨ Put petroleum ointment, such as Vaseline, on the area of the diaper that will touch your baby's penis. This will keep the diaper from sticking to your baby. ¨ Ask the doctor about giving your baby acetaminophen (Tylenol) for pain. Where can you learn more? Go to http://christiana-divine.info/. Enter 23  35 in the search box to learn more about \"Week 37 of Your Pregnancy: Care Instructions. \" Current as of: 2017 Content Version: 11.3 © 2212-2780 Healthwise, Incorporated. Care instructions adapted under license by [employer] (which disclaims liability or warranty for this information). If you have questions about a medical condition or this instruction, always ask your healthcare professional. [address] any warranty or liability for your use of this information. Introducing [facility]! Carmen Lechuga introduces Hochy eto patient portal. Now you can access parts of your medical record, email your doctor's office, and request medication refills online. 1. In your internet browser, go to [email or web address] 2. Click on the First Time User? Click Here link in the Sign In box. You will see the New Member Sign Up page. 3. Enter your Hochy eto Access Code exactly as it appears below. You will not need to use this code after youve completed the sign-up process. If you do not sign up before the expiration date, you must request a new code. · Hochy eto Access Code: [facility] Expires: 11/7/2017  4:38 PM 
 
4. Enter the last four digits of your Social Security Number (xxxx) and Date of Birth (mm/dd/yyyy) as indicated and click Submit. You will be taken to the next sign-up page. 5. Create a Hochy eto ID. This will be your Hochy eto login ID and cannot be changed, so think of one that is secure and easy to remember. 6. Create a Hochy eto password. You can change your password at any time. 7. Enter your Password Reset Question and Answer. This can be used at a later time if you forget your password. 8. Enter your e-mail address. You will receive e-mail notification when new information is available in [address] 9. Click Sign Up. You can now view and download portions of your medical record. 10. Click the Download Summary menu link to download a portable copy of your medical information.  
 
If you have questions, please visit the Frequently Asked Questions section of the Kiala. Remember, [email or web address] is NOT to be used for urgent needs. For medical emergencies, dial 911. Now available from your iPhone and Android! Please provide this summary of care documentation to your next provider. Your primary care clinician is listed as [address] [place] [address], . If you have any questions after today's visit, please call [phone].

## 2017-09-05 NOTE — PROGRESS NOTES
Chief Complaint   Patient presents with    Routine Prenatal Visit     37weeks. Patient is not having any nausea, vomiting, cramping, bleeding, no leakage of fluid,+fetal movement.

## 2017-09-05 NOTE — PROGRESS NOTES
Return OB Visit       Subjective:   Soni Shah 28 y.o.   GUERO: 2017, Based on last menstrual period of 2016 (Approximate)  GA:  37w0d. States she does not have abdominal pain  , headache , nausea and vomiting, pelvic pressure, right upper quadrant pain  , shortness of breath, swelling, vaginal bleeding , vaginal leaking of fluid  and visual disturbances. Fetal movements are present. She is taking PNV and she is eating healthy, drinking plenty of fluids. No complains today. Allergies- reviewed:   No Known Allergies      Medications- reviewed:   Current Outpatient Prescriptions   Medication Sig    prenatal vit-fe fum-fa-dss (PRENATAL 19) 29-1 mg Tab Take  by mouth. No current facility-administered medications for this visit. Past Medical History- reviewed:  Past Medical History:   Diagnosis Date    Irregular menses     PCOS (polycystic ovarian syndrome)          Past Surgical History- reviewed:   No past surgical history on file. Social History- reviewed:  Social History     Social History    Marital status:      Spouse name: N/A    Number of children: N/A    Years of education: N/A     Occupational History    Not on file.      Social History Main Topics    Smoking status: Never Smoker    Smokeless tobacco: Never Used    Alcohol use No    Drug use: No    Sexual activity: Yes     Partners: Male     Other Topics Concern    Not on file     Social History Narrative         Immunizations- reviewed:   Immunization History   Administered Date(s) Administered    Influenza Vaccine (Quad) PF 2017    Tdap 2017     Flu vaccine will give today            Objective:     Visit Vitals    /67 (BP 1 Location: Left arm, BP Patient Position: Sitting)    Pulse 82    Temp 98.4 °F (36.9 °C) (Oral)    Resp 17    Ht 5' 3\" (1.6 m)    Wt 193 lb 9.6 oz (87.8 kg)    LMP 2016 (Approximate)    SpO2 97%    BMI 34.29 kg/m2 Physical Exam:  GENERAL APPEARANCE: alert, well appearing, in no apparent distress  LUNGS: clear to auscultation, no wheezes, rales or rhonchi, symmetric air entry  HEART: regular rate and rhythm, no murmurs  ABDOMEN: soft, nontender, nondistended, no abnormal masses, no epigastric pain, bowel sounds present, fundal height 37 cm, FHT present at 130-140 bpm  BACK: no CVA tenderness  UTERUS: gravid  EXTREMITIES: no redness or tenderness in the calves or thighs, no edema  NEUROLOGICAL: alert, oriented, normal speech, no focal findings or movement disorder noted    Bedside U/S shows cephalic presentation      Labs    Recent Results (from the past 12 hour(s))   AMB POC URINALYSIS DIP STICK AUTO W/O MICRO    Collection Time: 17  2:07 PM   Result Value Ref Range    Color (UA POC) Yellow     Clarity (UA POC) Clear     Glucose (UA POC) Negative Negative    Bilirubin (UA POC) Negative Negative    Ketones (UA POC) Negative Negative    Specific gravity (UA POC) 1.015 1.001 - 1.035    Blood (UA POC) Negative Negative    pH (UA POC) 6.0 4.6 - 8.0    Protein (UA POC) Negative Negative mg/dL    Urobilinogen (UA POC) normal 0.2 - 1    Nitrites (UA POC) Negative Negative    Leukocyte esterase (UA POC) Negative Negative         Assessment   32 y.o.   at  37w0d   /67   FH 37 cm  FHTs 130-140 bpm  Urine Clear  PNL Taking       Plan   · SIUP at 37 weeks by 9 weeks US. GUERO: 2017. Blood type O positive, Rubella and varicella immune,HepB/ HIV negative, RPR non reactive, 1H GTT WNL. Second trimester anatomy scan WNL. Having  a boy.  -Tdap was given  - Getting Flu shot today    · GBS positive status:will need intrapartum prophylaxis.  No allergies per patient,will give penicillin intrapartum      Come back in 1 week for routine prenatal visi    Labor precautions discussed, including: Regular painful contractions, lasting for greater than one hour, taking your breath away; any vaginal bleeding; any leakage of fluid; or absent or decreased fetal movement. Call M.D. on call if any of these symptoms or signs occur. I have discussed the diagnosis with the patient and the intended plan as seen in the above orders. The patient has received an after-visit summary and questions were answered concerning future plans. I have discussed medication side effects and warnings with the patient as well. Informed pt to return to the office or go to the ER if she experiences vaginal bleeding, vaginal discharge, leaking of fluid, pelvic cramping. Pt was discussed with Dr. Anjelica Rosas, Attending Physician. Caleb Espinosa MD  Family Medicine Resident, PGY-2. Encounter Diagnoses:    ICD-10-CM ICD-9-CM    1. Encounter for supervision of normal pregnancy in third trimester, unspecified  Z34.93 V22.1 AMB POC URINALYSIS DIP STICK AUTO W/O MICRO      INFLUENZA VIRUS VAC QUAD,SPLIT,PRESV FREE SYRINGE 3/> YRS IM      CANCELED: AMB POC URINALYSIS DIP STICK AUTO W/ MICRO    2.  Positive GBS test B95.1 041.02

## 2017-09-12 ENCOUNTER — ROUTINE PRENATAL (OUTPATIENT)
Dept: FAMILY MEDICINE CLINIC | Age: 32
End: 2017-09-12

## 2017-09-12 VITALS
HEART RATE: 67 BPM | HEIGHT: 63 IN | SYSTOLIC BLOOD PRESSURE: 99 MMHG | RESPIRATION RATE: 16 BRPM | OXYGEN SATURATION: 99 % | WEIGHT: 196.8 LBS | DIASTOLIC BLOOD PRESSURE: 66 MMHG | TEMPERATURE: 98.4 F | BODY MASS INDEX: 34.87 KG/M2

## 2017-09-12 DIAGNOSIS — Z3A.38 38 WEEKS GESTATION OF PREGNANCY: Primary | ICD-10-CM

## 2017-09-12 LAB
BILIRUB UR QL STRIP: NEGATIVE
GLUCOSE UR-MCNC: NEGATIVE MG/DL
KETONES P FAST UR STRIP-MCNC: NEGATIVE MG/DL
PH UR STRIP: 6.5 [PH] (ref 4.6–8)
PROT UR QL STRIP: NEGATIVE MG/DL
SP GR UR STRIP: 1.03 (ref 1–1.03)
UA UROBILINOGEN AMB POC: NORMAL (ref 0.2–1)
URINALYSIS CLARITY POC: CLEAR
URINALYSIS COLOR POC: YELLOW
URINE BLOOD POC: NEGATIVE
URINE LEUKOCYTES POC: NEGATIVE
URINE NITRITES POC: NEGATIVE

## 2017-09-12 NOTE — PROGRESS NOTES
Chief Complaint   Patient presents with    Routine Prenatal Visit     /=PT is being seen for routine pre- exam. PT present with no complaints

## 2017-09-12 NOTE — PATIENT INSTRUCTIONS
Week 38 of Your Pregnancy: Care Instructions  Your Care Instructions    Believe it or not, your baby is almost here. You may have ideas about your baby's personality because of how much he or she moves. Or you may have noticed how he or she responds to sounds, warmth, cold, and light. You may even know what kind of music your baby likes. By now, you have a better idea of what to expect during delivery. You may have talked about your birth preferences with your doctor. But even if you want a vaginal birth, it is a good idea to learn about  births.  birth means that your baby is born through a cut (incision) in your lower belly. It is sometimes the best choice for the health of the baby and the mother. This care sheet can help you understand  births. It also gives you information about what to expect after your baby is born. And it helps you understand more about postpartum depression. Follow-up care is a key part of your treatment and safety. Be sure to make and go to all appointments, and call your doctor if you are having problems. It's also a good idea to know your test results and keep a list of the medicines you take. How can you care for yourself at home? Learn about  birth  · Most C-sections are unplanned. They are done because of problems that occur during labor. These problems might include:  ¨ Labor that slows or stops. ¨ High blood pressure or other problems for the mother. ¨ Signs of distress in the baby. These signs may include a very fast or slow heart rate. · Although most mothers and babies do well after , it is major surgery. It has more risks than a vaginal delivery. · In some cases, a planned  may be safer than a vaginal delivery. This may be the case if:  ¨ The mother has a health problem, such as a heart condition. ¨ The baby isn't in a head-down position for delivery. This is called a breech position.   ¨ The uterus has scars from past surgeries. This could increase the chance of a tear in the uterus. ¨ There is a problem with the placenta. ¨ The mother has an infection, such as genital herpes, that could be spread to the baby. ¨ The mother is having twins or more. ¨ The baby weighs 9 to 10 pounds or more. · Because of the risks of , planned C-sections generally should be done only for medical reasons. And a planned  should be done at 39 weeks or later unless there is a medical reason to do it sooner. Know what to expect after delivery, and plan for the first few weeks at home  · You, your baby, and your partner or  will get identification bands. Only people with matching bands can  the baby from the nursery. · You will learn how to feed, diaper, and bathe your baby. And you will learn how to care for the umbilical cord stump. If your baby will be circumcised, you will also learn how to care for that. · Ask people to wait to visit you until you are at home. And ask them to wash their hands before they touch your baby. · Make sure you have another adult in your home for at least 2 or 3 days after the birth. · During the first 2 weeks, limit when friends and family can visit. · Do not allow visitors who have colds or infections. Make sure all visitors are up to date with their vaccinations. Never let anyone smoke around your baby. · Try to nap when the baby naps. Be aware of postpartum depression  · \"Baby blues\" are common for the first 1 to 2 weeks after birth. You may cry or feel sad or irritable for no reason. · For some women, these feelings last longer and are more intense. This is called postpartum depression. · If your symptoms last for more than a few weeks or you feel very depressed, ask your doctor for help. · Postpartum depression can be treated. Support groups and counseling can help. Sometimes medicine can also help. Where can you learn more?   Go to http://christiana-divine.info/. Enter B044 in the search box to learn more about \"Week 38 of Your Pregnancy: Care Instructions. \"  Current as of: March 16, 2017  Content Version: 11.3  © 1223-1923 CrowdChat, Incorporated. Care instructions adapted under license by Videregen (which disclaims liability or warranty for this information). If you have questions about a medical condition or this instruction, always ask your healthcare professional. [address] any warranty or liability for your use of this information.

## 2017-09-12 NOTE — MR AVS SNAPSHOT
Visit Information Date & Time Provider Department Dept. Phone Encounter #  
 9/12/2017  2:00 PM Richard Liz MD [address] [place] [address] [phone] [id number] Follow-up Instructions Return in about 1 week (around 9/19/2017). Upcoming Health Maintenance Date Due  
 PAP AKA CERVICAL CYTOLOGY 2/10/2020 DTaP/Tdap/Td series (2 - Td) 7/11/2027 Allergies as of 9/12/2017  Review Complete On: 9/12/2017 By: Nga East No Known Allergies Current Immunizations  Reviewed on 9/5/2017 Name Date Influenza Vaccine (Quad) PF 9/5/2017 Tdap 7/11/2017 Not reviewed this visit You Were Diagnosed With   
  
 Codes Comments 38 weeks gestation of pregnancy    -  Primary ICD-10-CM: Z3A.38 
ICD-9-CM: V22.2 Vitals BP Pulse Temp Resp Height(growth percentile) Weight(growth percentile) 99/66 (BP 1 Location: Left arm, BP Patient Position: Sitting) 67 98.4 °F (36.9 °C) (Oral) 16 5' 3\" (1.6 m) 196 lb 12.8 oz (89.3 kg) LMP SpO2 BMI OB Status Smoking Status 12/05/2016 (Approximate) 99% 34.86 kg/m2 Pregnant Never Smoker BMI and BSA Data Body Mass Index Body Surface Area 34.86 kg/m 2 1.99 m 2 Preferred Pharmacy Pharmacy Name Phone [facility] [address] [place] [address] [address]  [facility] [phone] Your Updated Medication List  
  
   
This list is accurate as of: 9/12/17  3:01 PM.  Always use your most recent med list.  
  
  
  
  
 PRENATAL 19 (WITH DOCUSATE) 29-1 mg Tab Generic drug:  prenatal vit-fe fum-fa-dss Take  by mouth. We Performed the Following AMB POC URINALYSIS DIP STICK AUTO W/ MICRO [75599 CPT(R)] Follow-up Instructions Return in about 1 week (around 9/19/2017). Patient Instructions Week 38 of Your Pregnancy: Care Instructions Your Care Instructions Believe it or not, your baby is almost here. You may have ideas about your baby's personality because of how much he or she moves. Or you may have noticed how he or she responds to sounds, warmth, cold, and light. You may even know what kind of music your baby likes. By now, you have a better idea of what to expect during delivery. You may have talked about your birth preferences with your doctor. But even if you want a vaginal birth, it is a good idea to learn about  births.  birth means that your baby is born through a cut (incision) in your lower belly. It is sometimes the best choice for the health of the baby and the mother. This care sheet can help you understand  births. It also gives you information about what to expect after your baby is born. And it helps you understand more about postpartum depression. Follow-up care is a key part of your treatment and safety. Be sure to make and go to all appointments, and call your doctor if you are having problems. It's also a good idea to know your test results and keep a list of the medicines you take. How can you care for yourself at home? Learn about  birth · Most C-sections are unplanned. They are done because of problems that occur during labor. These problems might include: 
¨ Labor that slows or stops. ¨ High blood pressure or other problems for the mother. ¨ Signs of distress in the baby. These signs may include a very fast or slow heart rate. · Although most mothers and babies do well after , it is major surgery. It has more risks than a vaginal delivery. · In some cases, a planned  may be safer than a vaginal delivery. This may be the case if: ¨ The mother has a health problem, such as a heart condition. ¨ The baby isn't in a head-down position for delivery. This is called a breech position. ¨ The uterus has scars from past surgeries. This could increase the chance of a tear in the uterus. ¨ There is a problem with the placenta. ¨ The mother has an infection, such as genital herpes, that could be spread to the baby. ¨ The mother is having twins or more. ¨ The baby weighs 9 to 10 pounds or more. · Because of the risks of , planned C-sections generally should be done only for medical reasons. And a planned  should be done at 39 weeks or later unless there is a medical reason to do it sooner. Know what to expect after delivery, and plan for the first few weeks at home · You, your baby, and your partner or  will get identification bands. Only people with matching bands can  the baby from the nursery. · You will learn how to feed, diaper, and bathe your baby. And you will learn how to care for the umbilical cord stump. If your baby will be circumcised, you will also learn how to care for that. · Ask people to wait to visit you until you are at home. And ask them to wash their hands before they touch your baby. · Make sure you have another adult in your home for at least 2 or 3 days after the birth. · During the first 2 weeks, limit when friends and family can visit. · Do not allow visitors who have colds or infections. Make sure all visitors are up to date with their vaccinations. Never let anyone smoke around your baby. · Try to nap when the baby naps. Be aware of postpartum depression · \"Baby blues\" are common for the first 1 to 2 weeks after birth. You may cry or feel sad or irritable for no reason. · For some women, these feelings last longer and are more intense. This is called postpartum depression. · If your symptoms last for more than a few weeks or you feel very depressed, ask your doctor for help. · Postpartum depression can be treated. Support groups and counseling can help. Sometimes medicine can also help. Where can you learn more? Go to http://christiana-divine.info/. Enter B044 in the search box to learn more about \"Week 38 of Your Pregnancy: Care Instructions. \" Current as of: March 16, 2017 Content Version: 11.3 © 6531-4741 NuMedii, Incorporated. Care instructions adapted under license by Rubysophic (which disclaims liability or warranty for this information). If you have questions about a medical condition or this instruction, always ask your healthcare professional. [address] any warranty or liability for your use of this information. Introducing [facility] SERVICES! Carmen Lechuga introduces Anke patient portal. Now you can access parts of your medical record, email your doctor's office, and request medication refills online. 1. In your internet browser, go to [email or web address] Jobs 2. Click on the First Time User? Click Here link in the Sign In box. You will see the New Member Sign Up page. 3. Enter your Anke Access Code exactly as it appears below. You will not need to use this code after youve completed the sign-up process. If you do not sign up before the expiration date, you must request a new code. · Anke Access Code: [facility] Expires: 11/7/2017  4:38 PM 
 
4. Enter the last four digits of your Social Security Number (xxxx) and Date of Birth (mm/dd/yyyy) as indicated and click Submit. You will be taken to the next sign-up page. 5. Create a Anke ID. This will be your Anke login ID and cannot be changed, so think of one that is secure and easy to remember. 6. Create a Anke password. You can change your password at any time. 7. Enter your Password Reset Question and Answer. This can be used at a later time if you forget your password. 8. Enter your e-mail address. You will receive e-mail notification when new information is available in [address] 9. Click Sign Up. You can now view and download portions of your medical record. 10. Click the Download Summary menu link to download a portable copy of your medical information. If you have questions, please visit the Frequently Asked Questions section of the DimensionU (formerly Tabula [employer]) website. Remember, DimensionU (formerly [employer]) is NOT to be used for urgent needs. For medical emergencies, dial 911. Now available from your iPhone and Android! Please provide this summary of care documentation to your next provider. Your primary care clinician is listed as [address] [place] [address]. If you have any questions after today's visit, please call [phone].

## 2017-09-12 NOTE — PROGRESS NOTES
Return OB Visit       Subjective:   Rick Shah 28 y.o.   GUERO: 2017, Based on last menstrual period of 2016 (Approximate)  GA:  38w0d. States she does not have abdominal pain  , chest pain, contractions, fever, headache , nausea and vomiting, right upper quadrant pain  , shortness of breath, vaginal bleeding , vaginal leaking of fluid  and visual disturbances. Fetal movement are present. She is taking PNV and she is eating healthy. Allergies- reviewed:   No Known Allergies      Medications- reviewed:   Current Outpatient Prescriptions   Medication Sig    prenatal vit-fe fum-fa-dss (PRENATAL 19) 29-1 mg Tab Take  by mouth. No current facility-administered medications for this visit. Past Medical History- reviewed:  Past Medical History:   Diagnosis Date    Irregular menses     PCOS (polycystic ovarian syndrome)          Past Surgical History- reviewed:   No past surgical history on file. Social History- reviewed:  Social History     Social History    Marital status:      Spouse name: N/A    Number of children: N/A    Years of education: N/A     Occupational History    Not on file.      Social History Main Topics    Smoking status: Never Smoker    Smokeless tobacco: Never Used    Alcohol use No    Drug use: No    Sexual activity: Yes     Partners: Male     Other Topics Concern    Not on file     Social History Narrative         Immunizations- reviewed:   Immunization History   Administered Date(s) Administered    Influenza Vaccine (Quad) PF 2017    Tdap 2017           Objective:     Visit Vitals    BP 99/66 (BP 1 Location: Left arm, BP Patient Position: Sitting)    Pulse 67    Temp 98.4 °F (36.9 °C) (Oral)    Resp 16    Ht 5' 3\" (1.6 m)    Wt 196 lb 12.8 oz (89.3 kg)    LMP 2016 (Approximate)    SpO2 99%    BMI 34.86 kg/m2       Physical Exam:  GENERAL APPEARANCE: alert, well appearing, in no apparent distress  LUNGS: clear to auscultation, no wheezes, rales or rhonchi, symmetric air entry  HEART: regular rate and rhythm, no murmurs  ABDOMEN: soft, nontender, nondistended, no abnormal masses, no epigastric pain, bowel sounds present, fundal height 39 cm, FHT present at 145-155 bpm  BACK: no CVA tenderness  UTERUS: gravid  EXTREMITIES: no redness or tenderness in the calves or thighs, no edema  NEUROLOGICAL: alert, oriented, normal speech, no focal findings or movement disorder noted  CERVIX: 2-3 dilated, 0 effaced, -2 station (chaperoned by Chadwick Lees LPN)    Labs  Recent Results (from the past 12 hour(s))   AMB POC URINALYSIS DIP STICK AUTO W/ MICRO    Collection Time: 17  2:50 PM   Result Value Ref Range    Color (UA POC) Yellow     Clarity (UA POC) Clear     Glucose (UA POC) Negative Negative    Bilirubin (UA POC) Negative Negative    Ketones (UA POC) Negative Negative    Specific gravity (UA POC) 1.030 1.001 - 1.035    Blood (UA POC) Negative Negative    pH (UA POC) 6.5 4.6 - 8.0    Protein (UA POC) Negative Negative mg/dL    Urobilinogen (UA POC) 0.2 mg/dL 0.2 - 1    Nitrites (UA POC) Negative Negative    Leukocyte esterase (UA POC) Negative Negative         Assessment   32 y.o.   at  38w0d   BP 99/66   -155cm  FHTs 39 bpm  Urine Clear  PNL taking      Plan   · SIUP at 38 weeks by LMP c/w  9 weeks US. GUERO: 2017. Blood type O positive, Rubella and varicella immune,HepB/ HIV negative, RPR non reactive, 1H GTT WNL. Second trimester anatomy scan WNL. Having  a boy. Cephalic presentation was confirmed by US last visit.  -Tdap was given  -Influenza vaccine was given  -Follow up in 1 week with me or Dr. Mimi Mckenna if not deliver  · GBS positive status:will need intrapartum prophylaxis.  No allergies per patient,will give penicillin intrapartum    Labor precautions discussed, including: Regular painful contractions, lasting for greater than one hour, taking your breath away; any vaginal bleeding; any leakage of fluid; or absent or decreased fetal movement. Call M.D. on call if any of these symptoms or signs occur. I have discussed the diagnosis with the patient and the intended plan as seen in the above orders. The patient has received an after-visit summary and questions were answered concerning future plans. I have discussed medication side effects and warnings with the patient as well. Informed pt to return to the office or go to the ER if she experiences vaginal bleeding, vaginal discharge, leaking of fluid, pelvic cramping. Pt was discussed with Dr. Tom Guy, Attending Physician. Dominick Osborn MD  Family Medicine Resident, PGY-2.       Encounter Diagnoses:    ICD-10-CM ICD-9-CM    1. 38 weeks gestation of pregnancy Z3A.38 V22.2 AMB POC URINALYSIS DIP STICK AUTO W/ MICRO

## 2017-09-18 ENCOUNTER — ROUTINE PRENATAL (OUTPATIENT)
Dept: FAMILY MEDICINE CLINIC | Age: 32
End: 2017-09-18

## 2017-09-18 VITALS
TEMPERATURE: 98.8 F | HEART RATE: 88 BPM | WEIGHT: 197 LBS | HEIGHT: 63 IN | RESPIRATION RATE: 18 BRPM | SYSTOLIC BLOOD PRESSURE: 105 MMHG | BODY MASS INDEX: 34.91 KG/M2 | OXYGEN SATURATION: 100 % | DIASTOLIC BLOOD PRESSURE: 67 MMHG

## 2017-09-18 DIAGNOSIS — B95.1 POSITIVE GBS TEST: ICD-10-CM

## 2017-09-18 DIAGNOSIS — Z3A.38 38 WEEKS GESTATION OF PREGNANCY: Primary | ICD-10-CM

## 2017-09-18 LAB
BILIRUB UR QL STRIP: NEGATIVE
GLUCOSE UR-MCNC: NEGATIVE MG/DL
KETONES P FAST UR STRIP-MCNC: NORMAL MG/DL
PH UR STRIP: 6.5 [PH] (ref 4.6–8)
PROT UR QL STRIP: NEGATIVE MG/DL
SP GR UR STRIP: 1.02 (ref 1–1.03)
UA UROBILINOGEN AMB POC: NORMAL (ref 0.2–1)
URINALYSIS CLARITY POC: CLEAR
URINALYSIS COLOR POC: YELLOW
URINE BLOOD POC: NEGATIVE
URINE LEUKOCYTES POC: NEGATIVE
URINE NITRITES POC: NEGATIVE

## 2017-09-18 NOTE — MR AVS SNAPSHOT
Visit Information Date & Time Provider Department Dept. Phone Encounter #  
 9/18/2017  2:25 PM Yvette Meyers MD [address] [place] [address] [phone] [id number] Follow-up Instructions Return in about 1 week (around 9/25/2017). Upcoming Health Maintenance Date Due  
 PAP AKA CERVICAL CYTOLOGY 2/10/2020 DTaP/Tdap/Td series (2 - Td) 7/11/2027 Allergies as of 9/18/2017  Review Complete On: 9/18/2017 By: Bret Quiñonez LPN No Known Allergies Current Immunizations  Reviewed on 9/5/2017 Name Date Influenza Vaccine (Quad) PF 9/5/2017 Tdap 7/11/2017 Not reviewed this visit You Were Diagnosed With   
  
 Codes Comments 38 weeks gestation of pregnancy    -  Primary ICD-10-CM: Z3A.38 
ICD-9-CM: V22.2 Positive GBS test     ICD-10-CM: B95.1 ICD-9-CM: 041.02 Vitals BP Pulse Temp Resp Height(growth percentile) Weight(growth percentile) 105/67 88 98.8 °F (37.1 °C) (Oral) 18 5' 3\" (1.6 m) 197 lb (89.4 kg) LMP SpO2 BMI OB Status Smoking Status 12/05/2016 (Approximate) 100% 34.9 kg/m2 Pregnant Never Smoker BMI and BSA Data Body Mass Index Body Surface Area 34.9 kg/m 2 1.99 m 2 Preferred Pharmacy Pharmacy Name Phone [facility] [address] [place] [address] [address] [facility] [phone] Your Updated Medication List  
  
   
This list is accurate as of: 9/18/17  3:22 PM.  Always use your most recent med list.  
  
  
  
  
 PRENATAL 19 (WITH DOCUSATE) 29-1 mg Tab Generic drug:  prenatal vit-fe fum-fa-dss Take  by mouth. We Performed the Following AMB POC URINALYSIS DIP STICK AUTO W/O MICRO [31237 CPT(R)] Follow-up Instructions Return in about 1 week (around 9/25/2017). Patient Instructions Week 39 of Your Pregnancy: Care Instructions Your Care Instructions During these final weeks, you may feel anxious to see your new baby.  babies often look different from what you see in pictures or movies. Right after birth, their heads may have a strange shape. Their eyes may be puffy. And their genitals may be swollen. They may also have very dry skin, or red marks on the eyelids, nose, or neck. Still, most parents think their babies are beautiful. Follow-up care is a key part of your treatment and safety. Be sure to make and go to all appointments, and call your doctor if you are having problems. It's also a good idea to know your test results and keep a list of the medicines you take. How can you care for yourself at home? Prepare to breastfeed · If you are breastfeeding, continue to eat healthy foods. · Avoid alcohol, cigarettes, and drugs. This includes prescription and over-the-counter medicines. · You can help prevent sore nipples if you feed your baby in the correct position. Nurses will help you learn to do this. · Your  will need to be fed about every 1½ to 3 hours. Choose the right birth control after your baby is born · Women who are breastfeeding can still get pregnant. Use birth control if you don't want to get pregnant. · Intrauterine devices (IUDs) work for women who want to wait at least 2 years before getting pregnant again. They are safe to use while you are breastfeeding. · Depo-Provera can be used while you are breastfeeding. It is a shot you get every 3 months. · Birth control pills work well. But you need a different kind of pill while you are breastfeeding. And when you start taking these pills, you need to make sure to use another type of birth control until you start your second pack. · Diaphragms, cervical caps, tubal implants, and condoms with spermicide work less well after birth. If you have a diaphragm or cervical cap, you will need to have it refitted. · Tubal ligation (tying your tubes) and vasectomy are both permanent. These are good options if you are sure you are done having children. Where can you learn more? Go to http://christiana-divine.info/. Enter Z623 in the search box to learn more about \"Week 39 of Your Pregnancy: Care Instructions. \" Current as of: March 16, 2017 Content Version: 11.3 © 7736-2861 ahoyDoc. Care instructions adapted under license by PutPlace (which disclaims liability or warranty for this information). If you have questions about a medical condition or this instruction, always ask your healthcare professional. [address] any warranty or liability for your use of this information. Introducing [facility] & HEALTH SERVICES! [facility] introduces StreetfaireHD patient portal. Now you can access parts of your medical record, email your doctor's office, and request medication refills online. 1. In your internet browser, go to [email or web address] 2. Click on the First Time User? Click Here link in the Sign In box. You will see the New Member Sign Up page. 3. Enter your [employer] Access Code exactly as it appears below. You will not need to use this code after youve completed the sign-up process. If you do not sign up before the expiration date, you must request a new code. · StreetfaireHD Access Code: [facility] Expires: 11/7/2017  4:38 PM 
 
4. Enter the last four digits of your Social Security Number (xxxx) and Date of Birth (mm/dd/yyyy) as indicated and click Submit. You will be taken to the next sign-up page. 5. Create a [employer] ID. This will be your StreetfaireHD login ID and cannot be changed, so think of one that is secure and easy to remember. 6. Create a [employer] password. You can change your password at any time. 7. Enter your Password Reset Question and Answer. This can be used at a later time if you forget your password. 8. Enter your e-mail address.  You will receive e-mail notification when new information is available in ItrybeforeIbuy. 9. Click Sign Up. You can now view and download portions of your medical record. 10. Click the Download Summary menu link to download a portable copy of your medical information. If you have questions, please visit the Frequently Asked Questions section of the ItrybeforeIbuy website. Remember, ItrybeforeIbuy is NOT to be used for urgent needs. For medical emergencies, dial 911. Now available from your iPhone and Android! Please provide this summary of care documentation to your next provider. Your primary care clinician is listed as [address] [place] [address]. If you have any questions after today's visit, please call [phone].

## 2017-09-18 NOTE — PATIENT INSTRUCTIONS
Week 39 of Your Pregnancy: Care Instructions  Your Care Instructions    During these final weeks, you may feel anxious to see your new baby. [place] babies often look different from what you see in pictures or movies. Right after birth, their heads may have a strange shape. Their eyes may be puffy. And their genitals may be swollen. They may also have very dry skin, or red marks on the eyelids, nose, or neck. Still, most parents think their babies are beautiful. Follow-up care is a key part of your treatment and safety. Be sure to make and go to all appointments, and call your doctor if you are having problems. It's also a good idea to know your test results and keep a list of the medicines you take. How can you care for yourself at home? Prepare to breastfeed  · If you are breastfeeding, continue to eat healthy foods. · Avoid alcohol, cigarettes, and drugs. This includes prescription and over-the-counter medicines. · You can help prevent sore nipples if you feed your baby in the correct position. Nurses will help you learn to do this. · Your  will need to be fed about every 1½ to 3 hours. Choose the right birth control after your baby is born  · Women who are breastfeeding can still get pregnant. Use birth control if you don't want to get pregnant. · Intrauterine devices (IUDs) work for women who want to wait at least 2 years before getting pregnant again. They are safe to use while you are breastfeeding. · Depo-Provera can be used while you are breastfeeding. It is a shot you get every 3 months. · Birth control pills work well. But you need a different kind of pill while you are breastfeeding. And when you start taking these pills, you need to make sure to use another type of birth control until you start your second pack. · Diaphragms, cervical caps, tubal implants, and condoms with spermicide work less well after birth.  If you have a diaphragm or cervical cap, you will need to have it refitted. · Tubal ligation (tying your tubes) and vasectomy are both permanent. These are good options if you are sure you are done having children. Where can you learn more? Go to http://christiana-divine.info/. Enter B967 in the search box to learn more about \"Week 39 of Your Pregnancy: Care Instructions. \"  Current as of: March 16, 2017  Content Version: 11.3  © 7099-3535 [employer]. Care instructions adapted under license by [employer] (which disclaims liability or warranty for this information). If you have questions about a medical condition or this instruction, always ask your healthcare professional. [address] any warranty or liability for your use of this information.

## 2017-09-18 NOTE — PROGRESS NOTES
Chief Complaint   Patient presents with    Routine Prenatal Visit     no concerns today. . baby moving,, taking prenatals. . no contractions, bleeding or pain. .     1. Have you been to the ER, urgent care clinic since your last visit? Hospitalized since your last visit? No    2. Have you seen or consulted any other health care providers outside of the [address] [place] [address] since your last visit? Include any pap smears or colon screening.  No

## 2017-09-18 NOTE — PROGRESS NOTES
Return OB Visit       Subjective:   Sharon Shah 28 y.o.   GUERO: 2017, Based on last menstrual period of 2016 (Approximate)  GA:  38w6d. States she does not have abdominal pain  , chest pain, headache , nausea and vomiting, pelvic pressure, right upper quadrant pain  , shortness of breath, swelling, vaginal bleeding , vaginal leaking of fluid  and visual disturbances. Fetal movements are present. She is taking PNV and she is eating healthy. Allergies- reviewed:    No Known Allergies      Medications- reviewed:   Current Outpatient Prescriptions   Medication Sig    prenatal vit-fe fum-fa-dss (PRENATAL 19) 29-1 mg Tab Take  by mouth. No current facility-administered medications for this visit. Past Medical History- reviewed:  Past Medical History:   Diagnosis Date    Irregular menses     PCOS (polycystic ovarian syndrome)          Past Surgical History- reviewed:   History reviewed. No pertinent surgical history. Social History- reviewed:  Social History     Social History    Marital status:      Spouse name: N/A    Number of children: N/A    Years of education: N/A     Occupational History    Not on file.      Social History Main Topics    Smoking status: Never Smoker    Smokeless tobacco: Never Used    Alcohol use No    Drug use: No    Sexual activity: Yes     Partners: Male     Other Topics Concern    Not on file     Social History Narrative         Immunizations- reviewed:   Immunization History   Administered Date(s) Administered    Influenza Vaccine (Quad) PF 2017    Tdap 2017         Objective:     Visit Vitals    /67    Pulse 88    Temp 98.8 °F (37.1 °C) (Oral)    Resp 18    Ht 5' 3\" (1.6 m)    Wt 197 lb (89.4 kg)    LMP 2016 (Approximate)    SpO2 100%    BMI 34.9 kg/m2       Physical Exam:  GENERAL APPEARANCE: alert, well appearing, in no apparent distress  LUNGS: clear to auscultation, no wheezes, rales or rhonchi, symmetric air entry  HEART: regular rate and rhythm, no murmurs  ABDOMEN: soft, nontender, nondistended, no abnormal masses, no epigastric pain, bowel sounds present, fundal height    cm, FHT present at  bpm  BACK: no CVA tenderness  UTERUS: gravid  EXTREMITIES: no redness or tenderness in the calves or thighs, no edema  NEUROLOGICAL: alert, oriented, normal speech, no focal findings or movement disorder noted    Cephalic presentation by [facility]    Recent Results (from the past 12 hour(s))   AMB POC URINALYSIS DIP STICK AUTO W/O MICRO    Collection Time: 17  2:50 PM   Result Value Ref Range    Color (UA POC) Yellow     Clarity (UA POC) Clear     Glucose (UA POC) Negative Negative    Bilirubin (UA POC) Negative Negative    Ketones (UA POC) Trace Negative    Specific gravity (UA POC) 1.025 1.001 - 1.035    Blood (UA POC) Negative Negative    pH (UA POC) 6.5 4.6 - 8.0    Protein (UA POC) Negative Negative mg/dL    Urobilinogen (UA POC) 0.2 mg/dL 0.2 - 1    Nitrites (UA POC) Negative Negative    Leukocyte esterase (UA POC) Negative Negative         Assessment   32 y.o.   at  38w6d   /67   FH 38 cm  FHTs 130-140 bpm  Urine Clear, negative LE, negative nitrites  PNL Taking      Plan   · SIUP at 38 weeks and 6 days by LMP c/w  9 weeks US. GUERO: 2017. Blood type O positive, Rubella and varicella immune,HepB/ HIV negative, RPR non reactive, 1H GTT WNL.  Second trimester anatomy scan WNL. Hg (17) 11.1. Having  a boy. Cephalic presentation was confirmed by [address] on 2017.  -Tdap and Influenza vaccines were give  -Follow up in 1 week if not deliver    · GBS positive status:will need intrapartum prophylaxis.  No allergies per patient,will give penicillin intrapartum    Labor precautions discussed, including: Regular painful contractions, lasting for greater than one hour, taking your breath away; any vaginal bleeding; any leakage of fluid; or absent or decreased fetal movement. Call M.D. on call if any of these symptoms or signs occur. I have discussed the diagnosis with the patient and the intended plan as seen in the above orders. The patient has received an after-visit summary and questions were answered concerning future plans. I have discussed medication side effects and warnings with the patient as well. Informed pt to return to the office or go to the ER if she experiences vaginal bleeding, vaginal discharge, leaking of fluid, pelvic cramping. Pt was discussed with Dr. Pita Maria, Attending Physician. Jesus Campoverde MD  Family Medicine Resident, PGY-2      Encounter Diagnoses:    ICD-10-CM ICD-9-CM    1. 38 weeks gestation of pregnancy Z3A.38 V22.2 AMB POC URINALYSIS DIP STICK AUTO W/O MICRO   2.  Positive GBS test B95.1 041.02

## 2017-09-26 ENCOUNTER — ROUTINE PRENATAL (OUTPATIENT)
Dept: FAMILY MEDICINE CLINIC | Age: 32
End: 2017-09-26

## 2017-09-26 VITALS
HEIGHT: 63 IN | WEIGHT: 198.6 LBS | BODY MASS INDEX: 35.19 KG/M2 | OXYGEN SATURATION: 98 % | DIASTOLIC BLOOD PRESSURE: 62 MMHG | SYSTOLIC BLOOD PRESSURE: 104 MMHG | HEART RATE: 88 BPM | RESPIRATION RATE: 17 BRPM | TEMPERATURE: 98.6 F

## 2017-09-26 DIAGNOSIS — Z34.93 ENCOUNTER FOR SUPERVISION OF NORMAL PREGNANCY IN THIRD TRIMESTER, UNSPECIFIED GRAVIDITY: Primary | ICD-10-CM

## 2017-09-26 DIAGNOSIS — B95.1 POSITIVE GBS TEST: ICD-10-CM

## 2017-09-26 LAB
BACTERIA UA POCT, BACTPOCT: NORMAL
BILIRUB UR QL STRIP: NEGATIVE
CASTS UA POCT: NORMAL
CLUE CELLS, CLUEPOCT: NORMAL
CRYSTALS UA POCT, CRYSPOCT: NORMAL
EPITHELIAL CELLS POCT: NORMAL
GLUCOSE UR-MCNC: NEGATIVE MG/DL
KETONES P FAST UR STRIP-MCNC: NEGATIVE MG/DL
MUCUS UA POCT, MUCPOCT: NORMAL
PH UR STRIP: 7 [PH] (ref 4.6–8)
PROT UR QL STRIP: NEGATIVE MG/DL
RBC UA POCT, RBCPOCT: NORMAL
SP GR UR STRIP: 1.03 (ref 1–1.03)
TRICH UA POCT, TRICHPOC: NORMAL
UA UROBILINOGEN AMB POC: NORMAL (ref 0.2–1)
URINALYSIS CLARITY POC: CLEAR
URINALYSIS COLOR POC: YELLOW
URINE BLOOD POC: NEGATIVE
URINE CULT COMMENT, POCT: NORMAL
URINE LEUKOCYTES POC: NEGATIVE
URINE NITRITES POC: NEGATIVE
WBC UA POCT, WBCPOCT: NORMAL
YEAST UA POCT, YEASTPOC: NORMAL

## 2017-09-26 NOTE — PROGRESS NOTES
Return OB Visit       Subjective:   Gina Shah 28 y.o.   GUERO: 2017, Based on last menstrual period of 2016 (Approximate)  GA:  40w0d. States she {DOES/DOES RPM:92036023}. Fetal movement not yet***present. She *** taking PNV and she is eating healthy. Allergies- reviewed:   No Known Allergies      Medications- reviewed:   Current Outpatient Prescriptions   Medication Sig    prenatal vit-fe fum-fa-dss (PRENATAL 19) 29-1 mg Tab Take  by mouth. No current facility-administered medications for this visit. Past Medical History- reviewed:  Past Medical History:   Diagnosis Date    Irregular menses     PCOS (polycystic ovarian syndrome)          Past Surgical History- reviewed:   No past surgical history on file. Social History- reviewed:  Social History     Social History    Marital status:      Spouse name: N/A    Number of children: N/A    Years of education: N/A     Occupational History    Not on file.      Social History Main Topics    Smoking status: Never Smoker    Smokeless tobacco: Never Used    Alcohol use No    Drug use: No    Sexual activity: Yes     Partners: Male     Other Topics Concern    Not on file     Social History Narrative         Immunizations- reviewed:   Immunization History   Administered Date(s) Administered    Influenza Vaccine (Quad) PF 2017    Tdap 2017     Flu vaccine ***  Tdap ***        Objective:     Visit Vitals    LMP 2016 (Approximate)       Physical Exam:  GENERAL APPEARANCE: {appearance:801882::\"alert, well appearing\",\"in no apparent distress\"}  LUNGS: {pe lungs ob:516140::\"clear to auscultation, no wheezes, rales or rhonchi, symmetric air entry\"}  HEART: {pe heart brief:114298::\"regular rate and rhythm\",\"no murmurs\"}  ABDOMEN: soft, nontender, nondistended, no abnormal masses, no epigastric pain, bowel sounds present, fundal height *** cm, FHT present at *** bpm  BACK: {pe back ob brief:217597::\"no CVA tenderness\"}  UTERUS: {pe uterus ob simple:488020::\"gravid\"}  EXTREMITIES: {pe extremities ob:204276::\"no redness or tenderness in the calves or thighs\",\"no edema\"}  NEUROLOGICAL: {pe neurologic exam ob:924491::\"alert, oriented, normal speech, no focal findings or movement disorder noted\"}  CERVIX: *** dilated, *** effaced, *** station (chaperoned by ***, ***)    EFW *** by Leopold's    Bedside U/S shows *** presentation      Labs  ***  No results found for this or any previous visit (from the past 12 hour(s)). Assessment   32 y.o.   at  40w0d   BP *** at ***  FH *** cm  FHTs *** bpm  Urine ***  PNL ***      Plan   · SIUP at 40 weeks and 0 days by LMP c/w  9 weeks US. GUERO: 2017. Blood type O positive, Rubella and varicella immune,HepB/ HIV negative, RPR non reactive, 1H GTT WNL.  Second trimester anatomy scan WNL. Hg (17) 11.1. Having  a boy. Cephalic presentation was confirmed by [address] on 2017.  -Tdap and Influenza vaccines were give  -Follow up in 1 week if not deliver     · GBS positive status:will need intrapartum prophylaxis. No allergies per patient,will give penicillin intrapartum       Labor precautions discussed, including: Regular painful contractions, lasting for greater than one hour, taking your breath away; any vaginal bleeding; any leakage of fluid; or absent or decreased fetal movement. Call M.D. on call if any of these symptoms or signs occur. I have discussed the diagnosis with the patient and the intended plan as seen in the above orders. The patient has received an after-visit summary and questions were answered concerning future plans. I have discussed medication side effects and warnings with the patient as well. Informed pt to return to the office or go to the ER if she experiences vaginal bleeding, vaginal discharge, leaking of fluid, pelvic cramping. Pt was discussed with Dr. Brice Felipe, Attending Physician.     Arturo Gaitan MD  Family Medicine Resident, PGY-2. Encounter Diagnoses:    ICD-10-CM ICD-9-CM    1. Encounter for supervision of normal pregnancy in third trimester Z34.93 V22.1 AMB POC URINALYSIS DIP STICK AUTO W/ MICRO    2.  Positive GBS test B95.1 041.02

## 2017-09-26 NOTE — PROGRESS NOTES
Return OB Visit       Subjective:   Bryce Shah 28 y.o.   GUERO: 2017, Based on last menstrual period of 2016 (Approximate)  GA:  40w0d. States she does not have abdominal pain  , chest pain, headache , nausea and vomiting, pelvic pressure, shortness of breath, swelling, vaginal bleeding , vaginal leaking of fluid  and visual disturbances. Fetal movements are present. She is taking PNV and she is eating healthy. Allergies- reviewed:   No Known Allergies      Medications- reviewed:   Current Outpatient Prescriptions   Medication Sig    prenatal vit-fe fum-fa-dss (PRENATAL 19) 29-1 mg Tab Take  by mouth. No current facility-administered medications for this visit. Past Medical History- reviewed:  Past Medical History:   Diagnosis Date    Irregular menses     PCOS (polycystic ovarian syndrome)          Past Surgical History- reviewed:   No past surgical history on file. Social History- reviewed:  Social History     Social History    Marital status:      Spouse name: N/A    Number of children: N/A    Years of education: N/A     Occupational History    Not on file.      Social History Main Topics    Smoking status: Never Smoker    Smokeless tobacco: Never Used    Alcohol use No    Drug use: No    Sexual activity: Yes     Partners: Male     Other Topics Concern    Not on file     Social History Narrative         Immunizations- reviewed:   Immunization History   Administered Date(s) Administered    Influenza Vaccine (Quad) PF 2017    Tdap 2017           Objective:     Visit Vitals    /62 (BP 1 Location: Left arm, BP Patient Position: Sitting)    Pulse 88    Temp 98.6 °F (37 °C) (Oral)    Resp 17    Ht 5' 3\" (1.6 m)    Wt 198 lb 9.6 oz (90.1 kg)    LMP 2016 (Approximate)    SpO2 98%    BMI 35.18 kg/m2       Physical Exam:  GENERAL APPEARANCE: alert, well appearing, in no apparent distress  LUNGS: clear to auscultation, no wheezes, rales or rhonchi, symmetric air entry  HEART: regular rate and rhythm, no murmurs  ABDOMEN: soft, nontender, nondistended, no abnormal masses, no epigastric pain, bowel sounds present, fundal height 39 cm, FHT present at 140-150 bpm  BACK: no CVA tenderness  UTERUS: gravid  EXTREMITIES: no redness or tenderness in the calves or thighs, no edema  NEUROLOGICAL: alert, oriented, normal speech, no focal findings or movement disorder noted  CERVIX: 3 dilated, 25 effaced, -2 station (chaperoned by Kirke Gowers, LPN)        Labs    Recent Results (from the past 12 hour(s))   AMB POC URINALYSIS DIP STICK AUTO W/ MICRO     Collection Time: 17  2:55 PM   Result Value Ref Range    Color (UA POC) Yellow     Clarity (UA POC) Clear     Glucose (UA POC) Negative Negative    Bilirubin (UA POC) Negative Negative    Ketones (UA POC) Negative Negative    Specific gravity (UA POC) 1.030 1.001 - 1.035    Blood (UA POC) Negative Negative    pH (UA POC) 7.0 4.6 - 8.0    Protein (UA POC) Negative Negative mg/dL    Urobilinogen (UA POC) 0.2 mg/dL 0.2 - 1    Nitrites (UA POC) Negative Negative    Leukocyte esterase (UA POC) Negative Negative    Epithelial cells (UA POC)      Mucus (UA POC)      WBCs (UA POC)      RBCs (UA POC)      Casts (UA POC)  Negative    Crystals (UA POC)  Negative    Clue Cells (UA POC)      Trichomonas (UA POC)      Yeast (UA POC)      Bacteria (UA POC)  Negative    URINE CULT COMMENT (UA POC)           Assessment   32 y.o.   at  40w0d   /62   FH 39 cm  FHTs 140-150 bpm  Urine Clear. PNL taking       Plan   · SIUP at 40 weeks and 0 days by LMP c/w  9 weeks US. GUERO: 2017. Blood type O positive, Rubella and varicella immune,HepB/ HIV negative, RPR non reactive, 1H GTT WNL.  Second trimester anatomy scan WNL. Hg (17) 11.1. Having  a boy.  Cephalic presentation was confirmed by [address] on 2017.  -Tdap and Influenza vaccines were given  -Rechecking H&H today   -IOL scheduled for 10/3/2017 at 6.00 am at [facility], the pt was informed.      · GBS positive status:will need intrapartum prophylaxis. No allergies per patient,will give penicillin intrapartum       Labor precautions discussed, including: Regular painful contractions, lasting for greater than one hour, taking your breath away; any vaginal bleeding; any leakage of fluid; or absent or decreased fetal movement. Call M.D. on call if any of these symptoms or signs occur. I have discussed the diagnosis with the patient and the intended plan as seen in the above orders. The patient has received an after-visit summary and questions were answered concerning future plans. I have discussed medication side effects and warnings with the patient as well. Informed pt to return to the office or go to the ER if she experiences vaginal bleeding, vaginal discharge, leaking of fluid, pelvic cramping. Pt was discussed with Dr. Kelechi Tan, Attending Physician. Jennifer Kelsey MD  Family Medicine Resident, PGY-2. Encounter Diagnoses:    ICD-10-CM ICD-9-CM    1. Encounter for supervision of normal pregnancy in third trimester Z34.93 V22.1 AMB POC URINALYSIS DIP STICK AUTO W/ MICRO       HGB & HCT   2.  Positive GBS test B95.1 041.02

## 2017-09-26 NOTE — MR AVS SNAPSHOT
Visit Information Date & Time Provider Department Dept. Phone Encounter #  
 2017  2:25 PM Christ Kanner, MD [facility] [phone] [id number] Upcoming Health Maintenance Date Due  
 PAP AKA CERVICAL CYTOLOGY 2/10/2020 DTaP/Tdap/Td series (2 - Td) 2027 Allergies as of 2017  Review Complete On: 2017 By: Elisa Marion LPN No Known Allergies Current Immunizations  Reviewed on 2017 Name Date Influenza Vaccine (Quad) PF 2017 Tdap 2017 Reviewed by Christ Kanner, MD on 2017 at  2:46 PM  
You Were Diagnosed With   
  
 Codes Comments Encounter for supervision of normal pregnancy in third trimester, unspecified     -  Primary ICD-10-CM: Z34.93 
ICD-9-CM: V22.1 Positive GBS test     ICD-10-CM: B95.1 ICD-9-CM: 041.02 Vitals BP Pulse Temp Resp Height(growth percentile) Weight(growth percentile) 104/62 (BP 1 Location: Left arm, BP Patient Position: Sitting) 88 98.6 °F (37 °C) (Oral) 17 5' 3\" (1.6 m) 198 lb 9.6 oz (90.1 kg) LMP SpO2 BMI OB Status Smoking Status 2016 (Approximate) 98% 35.18 kg/m2 Pregnant Never Smoker Vitals History BMI and BSA Data Body Mass Index Body Surface Area  
 35.18 kg/m 2 2 m 2 Preferred Pharmacy Pharmacy Name Phone [facility] [address] [place] [address] [address] [facility] [phone] Your Updated Medication List  
  
   
This list is accurate as of: 17  3:15 PM.  Always use your most recent med list.  
  
  
  
  
 PRENATAL 19 (WITH DOCUSATE) 29-1 mg Tab Generic drug:  prenatal vit-fe fum-fa-dss Take  by mouth. We Performed the Following AMB POC URINALYSIS DIP STICK AUTO W/ MICRO  [96601 CPT(R)] HGB & HCT [87644 CPT(R)] Patient Instructions Please to [facility]ig[facility] on 10/3/2017 at 6.00 am for induction of labor Week 40 of Your Pregnancy: Care Instructions Your Care Instructions By week 36, you have reached your due date. Your baby could be coming any day. But it's a good idea to think ahead to the next few weeks and what might happen. If this is your first time having a baby, try not to worry. If you don't start labor on your own by 41 or 42 weeks, your doctor may recommend giving you medicines to start labor. This care sheet gives you information about how labor can be started. It also gives you some ideas about breathing exercises you can do if you start to feel anxious or if you are trying to relax. Follow-up care is a key part of your treatment and safety. Be sure to make and go to all appointments, and call your doctor if you are having problems. It's also a good idea to know your test results and keep a list of the medicines you take. How can you care for yourself at home? Learn how labor can be started · If you and your baby are both healthy and ready, and if your cervix has started to open, your doctor may \"break your water\" (rupture the amniotic sac). This often starts labor. · If your cervix is not quite ready, you may get a medicine called Pitocin through an IV to start contractions. · If your cervix is still very firm, you may have prostaglandin tablets (misoprostol) placed in your vagina to soften the cervix. Try guided imagery to help you relax · Find a comfortable place to sit or lie down. Close your eyes. · Start by just taking a few deep breaths to help you relax. · Picture a setting that is calm and peaceful. This could be a beach, a mountain setting, a meadow, or a scene that you choose. · Imagine your scene, and try to add some detail. For example, is there a breeze? What does the keesha look like? Is it clear, or are there clouds? · It often helps to add a path to your scene.  For example, as you enter the meadow, imagine a path leading you through the meadow to the trees on the other side. As you follow the path farther into the [facility] SITE you feel more and more relaxed. · When you are deep into your scene and are feeling relaxed, take a few minutes to breathe slowly and feel the calm. · When you are ready, slowly take yourself out of the scene back to the present. Tell yourself that you will feel relaxed and refreshed and will bring that sense of calm with you. · Count to 3, and open your eyes. Where can you learn more? Go to http://christiana-divine.info/. Enter S572 in the search box to learn more about \"Week 40 of Your Pregnancy: Care Instructions. \" Current as of: March 16, 2017 Content Version: 11.3 © 4448-5190 [employer]. Care instructions adapted under license by [employer] (which disclaims liability or warranty for this information). If you have questions about a medical condition or this instruction, always ask your healthcare professional. [place] [address] any warranty or liability for your use of this information. Introducing [facility]! [employer] introduces [employer] patient portal. Now you can access parts of your medical record, email your doctor's office, and request medication refills online. 1. In your internet browser, go to [email or web address] 2. Click on the First Time User? Click Here link in the Sign In box. You will see the New Member Sign Up page. 3. Enter your [employer] Access Code exactly as it appears below. You will not need to use this code after youve completed the sign-up process. If you do not sign up before the expiration date, you must request a new code. · Arigo Access Code: [facility] Expires: 11/7/2017  4:38 PM 
 
4. Enter the last four digits of your Social Security Number (xxxx) and Date of Birth (mm/dd/yyyy) as indicated and click Submit.  You will be taken to the next sign-up page. 5. Create a [employer] ID. This will be your [employer] login ID and cannot be changed, so think of one that is secure and easy to remember. 6. Create a [employer] password. You can change your password at any time. 7. Enter your Password Reset Question and Answer. This can be used at a later time if you forget your password. 8. Enter your e-mail address. You will receive e-mail notification when new information is available in [address]. 9. Click Sign Up. You can now view and download portions of your medical record. 10. Click the Download Summary menu link to download a portable copy of your medical information. If you have questions, please visit the Frequently Asked Questions section of the [employer] website. Remember, [employer] is NOT to be used for urgent needs. For medical emergencies, dial 911. Now available from your iPhone and Android! Please provide this summary of care documentation to your next provider. Your primary care clinician is listed as [address] [place] [address]. If you have any questions after today's visit, please call [phone].

## 2017-09-26 NOTE — PROGRESS NOTES
Chief Complaint   Patient presents with    Routine Prenatal Visit     40 wks Patient has had some nausea, no vomiting, no cramping,leakage of fluid, dysuria, +fetal movement.

## 2017-09-26 NOTE — PATIENT INSTRUCTIONS
Please to [facility] on 10/3/2017 at 6.00 am for induction of labor  Week 40 of Your Pregnancy: Care Instructions  Your Care Instructions    By week 40, you have reached your due date. Your baby could be coming any day. But it's a good idea to think ahead to the next few weeks and what might happen. If this is your first time having a baby, try not to worry. If you don't start labor on your own by 41 or 42 weeks, your doctor may recommend giving you medicines to start labor. This care sheet gives you information about how labor can be started. It also gives you some ideas about breathing exercises you can do if you start to feel anxious or if you are trying to relax. Follow-up care is a key part of your treatment and safety. Be sure to make and go to all appointments, and call your doctor if you are having problems. It's also a good idea to know your test results and keep a list of the medicines you take. How can you care for yourself at home? Learn how labor can be started  · If you and your baby are both healthy and ready, and if your cervix has started to open, your doctor may \"break your water\" (rupture the amniotic sac). This often starts labor. · If your cervix is not quite ready, you may get a medicine called Pitocin through an IV to start contractions. · If your cervix is still very firm, you may have prostaglandin tablets (misoprostol) placed in your vagina to soften the cervix. Try guided imagery to help you relax  · Find a comfortable place to sit or lie down. Close your eyes. · Start by just taking a few deep breaths to help you relax. · Picture a setting that is calm and peaceful. This could be a beach, a mountain setting, a meadow, or a scene that you choose. · Imagine your scene, and try to add some detail. For example, is there a breeze? What does the keesha look like? Is it clear, or are there clouds? · It often helps to add a path to your scene.  For example, as you enter the meadow, imagine a path leading you through the meadow to the trees on the other side. As you follow the path farther into the [facility] you feel more and more relaxed. · When you are deep into your scene and are feeling relaxed, take a few minutes to breathe slowly and feel the calm. · When you are ready, slowly take yourself out of the scene back to the present. Tell yourself that you will feel relaxed and refreshed and will bring that sense of calm with you. · Count to 3, and open your eyes. Where can you learn more? Go to http://christiana-divine.info/. Enter K441 in the search box to learn more about \"Week 40 of Your Pregnancy: Care Instructions. \"  Current as of: March 16, 2017  Content Version: 11.3  © 2058-4367 [employer]. Care instructions adapted under license by [employer] (which disclaims liability or warranty for this information). If you have questions about a medical condition or this instruction, always ask your healthcare professional. [address] any warranty or liability for your use of this information.

## 2017-09-27 ENCOUNTER — HOSPITAL ENCOUNTER (INPATIENT)
Age: 32
LOS: 2 days | Discharge: HOME | DRG: 560 | End: 2017-09-29
Attending: FAMILY MEDICINE | Admitting: FAMILY MEDICINE
Payer: MEDICAID

## 2017-09-27 ENCOUNTER — TELEPHONE (OUTPATIENT)
Dept: FAMILY MEDICINE CLINIC | Age: 32
End: 2017-09-27

## 2017-09-27 ENCOUNTER — ANESTHESIA EVENT (OUTPATIENT)
Dept: LABOR AND DELIVERY | Age: 32
DRG: 560 | End: 2017-09-27
Payer: MEDICAID

## 2017-09-27 ENCOUNTER — ANESTHESIA (OUTPATIENT)
Dept: LABOR AND DELIVERY | Age: 32
DRG: 560 | End: 2017-09-27
Payer: MEDICAID

## 2017-09-27 PROBLEM — Z34.90 PREGNANCY: Status: ACTIVE | Noted: 2017-09-27

## 2017-09-27 LAB
ERYTHROCYTE [DISTWIDTH] IN BLOOD BY AUTOMATED COUNT: 14.6 % (ref 11.5–14.5)
HCT VFR BLD AUTO: 36.1 % (ref 35–47)
HGB BLD-MCNC: 12.5 G/DL (ref 11.5–16)
MCH RBC QN AUTO: 31.1 PG (ref 26–34)
MCHC RBC AUTO-ENTMCNC: 34.6 G/DL (ref 30–36.5)
MCV RBC AUTO: 89.8 FL (ref 80–99)
PLATELET # BLD AUTO: 198 K/UL (ref 150–400)
RBC # BLD AUTO: 4.02 M/UL (ref 3.8–5.2)
WBC # BLD AUTO: 8.7 K/UL (ref 3.6–11)

## 2017-09-27 PROCEDURE — 75410000002 HC LABOR FEE PER 1 HR: Performed by: FAMILY MEDICINE

## 2017-09-27 PROCEDURE — 77030011943

## 2017-09-27 PROCEDURE — 75410000000 HC DELIVERY VAGINAL/SINGLE: Performed by: FAMILY MEDICINE

## 2017-09-27 PROCEDURE — 85027 COMPLETE CBC AUTOMATED: CPT | Performed by: FAMILY MEDICINE

## 2017-09-27 PROCEDURE — 74011000250 HC RX REV CODE- 250

## 2017-09-27 PROCEDURE — 36415 COLL VENOUS BLD VENIPUNCTURE: CPT | Performed by: FAMILY MEDICINE

## 2017-09-27 PROCEDURE — 00HU33Z INSERTION OF INFUSION DEVICE INTO SPINAL CANAL, PERCUTANEOUS APPROACH: ICD-10-PCS | Performed by: ANESTHESIOLOGY

## 2017-09-27 PROCEDURE — 74011250636 HC RX REV CODE- 250/636

## 2017-09-27 PROCEDURE — 75410000003 HC RECOV DEL/VAG/CSECN EA 0.5 HR: Performed by: FAMILY MEDICINE

## 2017-09-27 PROCEDURE — 76060000078 HC EPIDURAL ANESTHESIA: Performed by: ANESTHESIOLOGY

## 2017-09-27 PROCEDURE — 59025 FETAL NON-STRESS TEST: CPT | Performed by: FAMILY MEDICINE

## 2017-09-27 PROCEDURE — 0UQMXZZ REPAIR VULVA, EXTERNAL APPROACH: ICD-10-PCS | Performed by: FAMILY MEDICINE

## 2017-09-27 PROCEDURE — 74011250636 HC RX REV CODE- 250/636: Performed by: FAMILY MEDICINE

## 2017-09-27 PROCEDURE — 74011000258 HC RX REV CODE- 258: Performed by: FAMILY MEDICINE

## 2017-09-27 PROCEDURE — 99282 EMERGENCY DEPT VISIT SF MDM: CPT | Performed by: FAMILY MEDICINE

## 2017-09-27 PROCEDURE — 74011250637 HC RX REV CODE- 250/637: Performed by: FAMILY MEDICINE

## 2017-09-27 PROCEDURE — 65270000029 HC RM PRIVATE

## 2017-09-27 PROCEDURE — 77030018749 HC HK AMNIO DISP DERY -A

## 2017-09-27 PROCEDURE — 10907ZC DRAINAGE OF AMNIOTIC FLUID, THERAPEUTIC FROM PRODUCTS OF CONCEPTION, VIA NATURAL OR ARTIFICIAL OPENING: ICD-10-PCS | Performed by: FAMILY MEDICINE

## 2017-09-27 PROCEDURE — 74011250636 HC RX REV CODE- 250/636: Performed by: ANESTHESIOLOGY

## 2017-09-27 PROCEDURE — 77030014125 HC TY EPDRL BBMI -B: Performed by: ANESTHESIOLOGY

## 2017-09-27 RX ORDER — FENTANYL/BUPIVACAINE/NS/PF 2-1250MCG
1-16 PREFILLED PUMP RESERVOIR EPIDURAL CONTINUOUS
Status: DISCONTINUED | OUTPATIENT
Start: 2017-09-27 | End: 2017-09-29 | Stop reason: HOSPADM

## 2017-09-27 RX ORDER — ZOLPIDEM TARTRATE 5 MG/1
5 TABLET ORAL
Status: DISCONTINUED | OUTPATIENT
Start: 2017-09-27 | End: 2017-09-27

## 2017-09-27 RX ORDER — IBUPROFEN 800 MG/1
800 TABLET ORAL
Status: DISCONTINUED | OUTPATIENT
Start: 2017-09-27 | End: 2017-09-29 | Stop reason: HOSPADM

## 2017-09-27 RX ORDER — OXYTOCIN/RINGER'S LACTATE 20/1000 ML
125-500 PLASTIC BAG, INJECTION (ML) INTRAVENOUS ONCE
Status: ACTIVE | OUTPATIENT
Start: 2017-09-27 | End: 2017-09-28

## 2017-09-27 RX ORDER — ZOLPIDEM TARTRATE 5 MG/1
5 TABLET ORAL
Status: DISCONTINUED | OUTPATIENT
Start: 2017-09-27 | End: 2017-09-29 | Stop reason: HOSPADM

## 2017-09-27 RX ORDER — OXYTOCIN IN 5 % DEXTROSE 30/500 ML
PLASTIC BAG, INJECTION (ML) INTRAVENOUS
Status: COMPLETED
Start: 2017-09-27 | End: 2017-09-27

## 2017-09-27 RX ORDER — ONDANSETRON 2 MG/ML
4 INJECTION INTRAMUSCULAR; INTRAVENOUS
Status: DISCONTINUED | OUTPATIENT
Start: 2017-09-27 | End: 2017-09-29 | Stop reason: HOSPADM

## 2017-09-27 RX ORDER — SODIUM CHLORIDE 0.9 % (FLUSH) 0.9 %
5-10 SYRINGE (ML) INJECTION EVERY 8 HOURS
Status: DISCONTINUED | OUTPATIENT
Start: 2017-09-27 | End: 2017-09-29 | Stop reason: HOSPADM

## 2017-09-27 RX ORDER — NALOXONE HYDROCHLORIDE 0.4 MG/ML
0.4 INJECTION, SOLUTION INTRAMUSCULAR; INTRAVENOUS; SUBCUTANEOUS AS NEEDED
Status: DISCONTINUED | OUTPATIENT
Start: 2017-09-27 | End: 2017-09-27 | Stop reason: SDUPTHER

## 2017-09-27 RX ORDER — BUPIVACAINE HYDROCHLORIDE 2.5 MG/ML
INJECTION, SOLUTION EPIDURAL; INFILTRATION; INTRACAUDAL AS NEEDED
Status: DISCONTINUED | OUTPATIENT
Start: 2017-09-27 | End: 2017-09-27 | Stop reason: HOSPADM

## 2017-09-27 RX ORDER — HYDROCORTISONE ACETATE PRAMOXINE HCL 2.5; 1 G/100G; G/100G
CREAM TOPICAL AS NEEDED
Status: DISCONTINUED | OUTPATIENT
Start: 2017-09-27 | End: 2017-09-29 | Stop reason: HOSPADM

## 2017-09-27 RX ORDER — SODIUM CHLORIDE 0.9 % (FLUSH) 0.9 %
5-10 SYRINGE (ML) INJECTION AS NEEDED
Status: DISCONTINUED | OUTPATIENT
Start: 2017-09-27 | End: 2017-09-29 | Stop reason: HOSPADM

## 2017-09-27 RX ORDER — NALOXONE HYDROCHLORIDE 0.4 MG/ML
0.4 INJECTION, SOLUTION INTRAMUSCULAR; INTRAVENOUS; SUBCUTANEOUS AS NEEDED
Status: DISCONTINUED | OUTPATIENT
Start: 2017-09-27 | End: 2017-09-29 | Stop reason: HOSPADM

## 2017-09-27 RX ADMIN — BUPIVACAINE HYDROCHLORIDE 10 ML: 2.5 INJECTION, SOLUTION EPIDURAL; INFILTRATION; INTRACAUDAL at 08:18

## 2017-09-27 RX ADMIN — SODIUM CHLORIDE 5 MILLION UNITS: 900 INJECTION, SOLUTION INTRAVENOUS at 07:30

## 2017-09-27 RX ADMIN — ONDANSETRON 4 MG: 2 INJECTION INTRAMUSCULAR; INTRAVENOUS at 07:57

## 2017-09-27 RX ADMIN — PENICILLIN G POTASSIUM 2.5 MILLION UNITS: 20000000 POWDER, FOR SOLUTION INTRAVENOUS at 11:30

## 2017-09-27 RX ADMIN — FENTANYL 0.2 MG/100ML-BUPIV 0.125%-NACL 0.9% EPIDURAL INJ 10 ML/HR: 2/0.125 SOLUTION at 08:17

## 2017-09-27 RX ADMIN — Medication 30000 MILLI-UNITS: at 12:12

## 2017-09-27 RX ADMIN — IBUPROFEN 800 MG: 800 TABLET, FILM COATED ORAL at 22:02

## 2017-09-27 NOTE — H&P
History & Physical    Name: Vivien Jiang MRN: [de-identified]  SSN: [id number]    YOB: 1985  Age: 28 y.o. Sex: female      Subjective:     Reason for Admission:  Pregnancy and Contractions    History of Present Illness: Ms. Ricardo Ruano is a 28 y.o.   female with an estimated gestational age of 44w3d with Estimated Date of Delivery: 17. Patient complains of moderate contractions and mild vaginal bleeding for 1 days. Pregnancy has been uncomplicated. Patient denies chest pain, fever, headache , nausea and vomiting, right upper quadrant pain  , shortness of breath, swelling, vaginal leaking of fluid  and visual disturbances. OB History    Para Term  AB Living   4 3 3   3   SAB TAB Ectopic Molar Multiple Live Births        3      # Outcome Date GA Lbr Odilon/2nd Weight Sex Delivery Anes PTL Lv   4 Current            3 Term 13 39w5d  2.915 kg M VAGINAL DELI None N MARY   2 Term 02/16/10 40w0d  3.345 kg F VAGINAL DELI  N MARY   1 Term 05 40w0d  3.374 kg F VAGINAL DELI  N MARY        Past Medical History:   Diagnosis Date    Irregular menses     PCOS (polycystic ovarian syndrome)      History reviewed. No pertinent surgical history. Social History     Occupational History    Not on file. Social History Main Topics    Smoking status: Never Smoker    Smokeless tobacco: Never Used    Alcohol use No    Drug use: No    Sexual activity: Yes     Partners: Male      Family History   Problem Relation Age of Onset    Hypertension Mother     Stroke Mother     Other Mother      Sturge Radford Syndrome    Asthma Father     Elevated Lipids Father     Other Sister      scolosis       No Known Allergies  Prior to Admission medications    Medication Sig Start Date End Date Taking? Authorizing Provider   prenatal vit-fe fum-fa-dss (PRENATAL 19) 29-1 mg Tab Take  by mouth.       Historical Provider        Review of Systems:  A comprehensive review of systems was negative except for that written in the History of Present Illness. Objective:     Vitals:    Vitals:    17 0646   Weight: 89.8 kg (198 lb)   Height: 5' 3\" (1.6 m)      Temp (24hrs), Av.6 °F (37 °C), Min:98.6 °F (37 °C), Max:98.6 °F (37 °C)    BP  Min: 104/62  Max: 104/62     Physical Exam:  Patient without distress. Heart: Regular rate and rhythm, S1S2 present or without murmur or extra heart sounds  Lung: clear to auscultation throughout lung fields, no wheezes, no rales, no rhonchi and normal respiratory effort  Fundus: soft and non tender  Cervical Exam: 5 cm dilated    100% effaced    -1 station    Lower Extremities: no swelling     Membranes:  Intact  Uterine Activity:  Frequency: Every 3 minutes   Fetal Heart Rate:  Baseline: 130 per minute       Lab/Data Review:  Recent Results (from the past 24 hour(s))   AMB POC URINALYSIS DIP STICK AUTO W/ MICRO     Collection Time: 17  2:55 PM   Result Value Ref Range    Color (UA POC) Yellow     Clarity (UA POC) Clear     Glucose (UA POC) Negative Negative    Bilirubin (UA POC) Negative Negative    Ketones (UA POC) Negative Negative    Specific gravity (UA POC) 1.030 1.001 - 1.035    Blood (UA POC) Negative Negative    pH (UA POC) 7.0 4.6 - 8.0    Protein (UA POC) Negative Negative mg/dL    Urobilinogen (UA POC) 0.2 mg/dL 0.2 - 1    Nitrites (UA POC) Negative Negative    Leukocyte esterase (UA POC) Negative Negative    Epithelial cells (UA POC)      Mucus (UA POC)      WBCs (UA POC)      RBCs (UA POC)      Casts (UA POC)  Negative    Crystals (UA POC)  Negative    Clue Cells (UA POC)      Trichomonas (UA POC)      Yeast (UA POC)      Bacteria (UA POC)  Negative    URINE CULT COMMENT (UA POC)         Assessment and Plan:     Ms. Kathie Kehr is a 28 y.o.   female with an estimated gestational age of 44w3d who is here for active labor.   PNL: O+, GBS +, G/C neg, HBsAg neg, HIV non reactive, RPR non reactive, Rubella and VZV immune. 1. Routine  care  2. Penicillin for GBS + status    Patient will be discussed with Dr. Gualberto Christie.      Nemo Morales MD  Family Medicine Resident

## 2017-09-27 NOTE — PROGRESS NOTES
Labor Progress Note  I came to labor and delivery to assume the patient's care. Patient seen, fetal heart rate and contraction pattern evaluated, patient examined. The patient is feeling OK. Feels fetal movements. Patient Vitals for the past 1 hrs:   Height Weight   17 0646 5' 3\" (1.6 m) 198 lb (89.8 kg)       Physical Exam:  Cervical Exam:  Cervical Exam  Membrane Status: (P) Intact  Membranes:  Intact  Fetal Heart Rate: Reactive  Baseline: 130 per minute  Variability: moderate  Accelerations: yes  Decelerations: none  Uterine contractions: Every 4 minutes    Assessment/Plan:  Sharmin Lam is a 28 y.o. [id number] at 40w1d admitted for labor. 1.SIUP at 40 weeks and 1 days by LMP c/w  9 weeks US. GUERO: 2017. Blood type O positive, Rubella and varicella immune,HepB/ HIV negative, RPR non reactive, 1H GTT WNL.  Second trimester anatomy scan WNL. Hg (17) 11.1.  Having  a boy. Cephalic presentation was confirmed by [address] on 2017.  -Anticipate   -The pt desires epidural  2.  GBS positive status:  -Penicillin for prophylaxis  Patient to be discussed with Dr. Ashli Pineda ( the attending physician)  Luzma Gordon MD  Family Medicine Resident

## 2017-09-27 NOTE — TELEPHONE ENCOUNTER
Received page to call patient, spoke to pt at 6:25am, pt reported she is on her way to [facility] ED. Pt 27 y/o [id number] @ 40w1d, states she has been having ctx since 4am, now every 2-5 minutes, getting stronger and closer together. Denies LOF. Reports \"tinge of blood\" with wiping, no other vaginal bleeding. Good FM. Was seen in clinic yesterday, CVE was 3/25/-2 at that time. PNL labs:   GBS POSITIVE - needs PCN ppx. No allergies. O+  Rubella and VZV immune  Hep B neg  HIV neg  RPR NR  1hr GTT wnl  Baby boy, confirmed vertex 9/5/17  S/p Tdap and influenza vaccine    Signed out to day OB team at 6:30, pt's ETA is 6:40AM    Pt is OB continuity of Dr. Kenyetta Aguilar, she was called and spoken to.  She is on her way to the hospital.     Avis Lopez MD  Family Medicine Resident

## 2017-09-27 NOTE — PROGRESS NOTES
2430 Assumed care of patient at this time. 3852 Time out performed for epidural placement with Dr. Jennifer John.     9074 Patient stated feeling vaginal pressure. SVE by this RN 6-7/100/0 with BBOW.     1123 Patient requesting cervical check. SVE by this RN 10/100/+1, BOW still intact. [address] Notified charge nurse, Terrence Davis RN. RN to call MD.     1134 Dr. Gustabo Rai at bedside. SVE by MD 10cm. VORB to continue with dose of penicillin. 1505 Attempted to ambulated patient to bathroom. Patient stated too weak to ambulate. Attempted bedpan, patient does not feel the need to go. Will attempt again in one hour. Fundal check below umbilicus -1, midline with small amount of bleeding. Pads changed. 1400 Ambulated patient to bathroom. Urine output 500mL. 1733 TRANSFER - OUT REPORT:    Verbal report given to Ayan Pang RN(name) on [facility]  being transferred to MIU(unit) for routine progression of care       Report consisted of patients Situation, Background, Assessment and   Recommendations(SBAR). Information from the following report(s) SBAR, Kardex, Intake/Output, MAR and Recent Results was reviewed with the receiving nurse. Lines:   Peripheral IV 09/27/17 Right Hand (Active)   Site Assessment Clean, dry, & intact 9/27/2017  2:08 PM   Phlebitis Assessment 0 9/27/2017  2:08 PM   Infiltration Assessment 0 9/27/2017  2:08 PM   Dressing Status Clean, dry, & intact 9/27/2017  2:08 PM   Dressing Type Transparent;Tape 9/27/2017  2:08 PM   Hub Color/Line Status Pink; Infusing 9/27/2017  2:08 PM   Alcohol Cap Used Yes 9/27/2017  2:08 PM        Opportunity for questions and clarification was provided.       Patient transported with:   Registered Nurse

## 2017-09-27 NOTE — IP AVS SNAPSHOT
[address] [place] [address] 
[phone] Patient: Chandrika Cornell MRN: [id number] :1985 You are allergic to the following No active allergies Recent Documentation Height Weight Breastfeeding? BMI OB Status Smoking Status 1.6 m 89.8 kg Unknown 35.07 kg/m2 Recent pregnancy Never Smoker Unresulted Labs Order Current Status SAMPLE TO BLOOD BANK In process Emergency Contacts Name Discharge Info Relation Home Work Mobile PabloKwabena DISCHARGE CAREGIVER [3] Spouse [3]   [phone] About your hospitalization You were admitted on:  2017 You last received care in the:  [facility] 3 [facility] You were discharged on:  2017 Unit phone number:  [phone] Why you were hospitalized Your primary diagnosis was:  Not on File Your diagnoses also included:  Pregnancy Providers Seen During Your Hospitalizations Provider Role Specialty Primary office phone Siena El MD Attending Provider [facility] [phone] Your Primary Care Physician (PCP) Primary Care Physician Office Phone Office Fax Caio Damon [phone] Follow-up Information Follow up With Details Comments Contact Info Siena El MD In 6 weeks For post partum follow up  [address] [place] [address] 
[phone] Current Discharge Medication List  
  
START taking these medications Dose & Instructions Dispensing Information Comments Morning Noon Evening Bedtime  
 ibuprofen 800 mg tablet Commonly known as:  MOTRIN Your last dose was: Your next dose is:    
   
   
 Dose:  800 mg Take 1 Tab by mouth every eight (8) hours as needed. Quantity:  30 Tab Refills:  0 CONTINUE these medications which have NOT CHANGED Dose & Instructions Dispensing Information Comments Morning Noon Evening Bedtime PRENATAL 19 (WITH DOCUSATE) 29-1 mg Tab Generic drug:  prenatal vit-fe fum-fa-dss Your last dose was: Your next dose is: Take  by mouth. Refills:  0 Where to Get Your Medications Information on where to get these meds will be given to you by the nurse or doctor. ! Ask your nurse or doctor about these medications  
  ibuprofen 800 mg tablet Discharge Instructions For pain: Take Motrin 800 mg every 8 hours if needed POST DELIVERY DISCHARGE INSTRUCTIONS Name: Vivien Jiang YOB: 1985 Primary Diagnosis: Active Problems: 
  Pregnancy (9/27/2017) General:  
 
Diet/Diet Restrictions: 
Eight 8-ounce glasses of fluid daily (water, juices); avoid excessive caffeine intake. Meals/snacks as desired which are high in fiber and carbohydrates and low in fat and cholesterol. Physical Activity / Restrictions / Safety:  
 
Avoid heavy lifting, no more that 8 lbs. For 2-3 weeks; limit use of stairs to 2 times daily for the first week home. No driving for one week. Avoid intercourse 4-6 weeks, no douching or tampon use. Check with obstetrician before starting or resuming an exercise program.    
 
 
Discharge Instructions/Special Treatment/Home Care Needs:  
 
Continue prenatal vitamins. Continue to use squirt bottle with warm water on your episiotomy after each bathroom use until bleeding stops. If steri-strips applied to your incision, remove in 7-10 days. Call your doctor for the following:  
 
Fever over 101 degrees by mouth. Vaginal bleeding heavier than a normal menstrual period or clot larger than a golf ball.  
Red streaks or increased swelling of legs, painful red streaks on your breast. 
 Painful urination, constipation and increased pain or swelling or discharge with your incision. If you feel extremely anxious or overwhelmed. If you have thoughts of harming yourself and/or your baby. Pain Management:  
 
Pain Management:  
Take Acetaminophen (Tylenol) or Ibuprofen (Advil, Motrin), as directed for pain. Use a warm Sitz bath 3 times daily to relieve episiotomy or hemorrhoidal discomfort. Heating pad to  incision as needed. For hemorrhoidal discomfort, use Tucks and Anusol cream as needed and directed. Follow-Up Care: These are general instructions for a healthy lifestyle: No smoking/ No tobacco products/ Avoid exposure to second hand smoke Surgeon General's Warning:  Quitting smoking now greatly reduces serious risk to your health. Obesity, smoking, and sedentary lifestyle greatly increases your risk for illness A healthy diet, regular physical exercise & weight monitoring are important for maintaining a healthy lifestyle Recognize signs and symptoms of STROKE: 
 
F-face looks uneven A-arms unable to move or move unevenly S-speech slurred or non-existent T-time-call 911 as soon as signs and symptoms begin-DO NOT go Back to bed or wait to see if you get better-TIME IS BRAIN. [employer] Activation Thank you for requesting access to [employer]. Please follow the instructions below to securely access and download your online medical record. [employer] allows you to send messages to your doctor, view your test results, renew your prescriptions, schedule appointments, and more. How Do I Sign Up? 1. In your internet browser, go to [email or web address] 
2. Click on the First Time User? Click Here link in the Sign In box. You will be redirect to the New Member Sign Up page. 3. Enter your [employer] Access Code exactly as it appears below. You will not need to use this code after youve completed the sign-up process.  If you do not sign up before the expiration date, you must request a new code. [facility] Access Code: [facility] Expires: 2017  4:38 PM (This is the date your [employer] access code will ) 4. Enter the last four digits of your Social Security Number (xxxx) and Date of Birth (mm/dd/yyyy) as indicated and click Submit. You will be taken to the next sign-up page. 5. Create a [facility] ID. This will be your [facility] login ID and cannot be changed, so think of one that is secure and easy to remember. 6. Create a [facility] password. You can change your password at any time. 7. Enter your Password Reset Question and Answer. This can be used at a later time if you forget your password. 8. Enter your e-mail address. You will receive e-mail notification when new information is available in [address] 9. Click Sign Up. You can now view and download portions of your medical record. 10. Click the Download Summary menu link to download a portable copy of your medical information. Additional Information If you have questions, please visit the Frequently Asked Questions section of the [employer] website at [email or web address] Remember, [facility] is NOT to be used for urgent needs. For medical emergencies, dial 911. Signed By: Kae Tompkins RN                                                                                                   Date: 2017 Time: 9:36 AM 
 
 
 
Discharge Instructions Attachments/References POSTPARTUM (ENGLISH) Discharge Orders None Introducing [facility]! Colten Cárdenas introduces [facility] patient portal. Now you can access parts of your medical record, email your doctor's office, and request medication refills online. 1. In your internet browser, go to [email or web address] 2. Click on the First Time User? Click Here link in the Sign In box. You will see the New Member Sign Up page. 3. Enter your [employer] Access Code exactly as it appears below. You will not need to use this code after youve completed the sign-up process. If you do not sign up before the expiration date, you must request a new code. · [employer] Access Code: [facility] Expires: 11/7/2017  4:38 PM 
 
4. Enter the last four digits of your Social Security Number (xxxx) and Date of Birth (mm/dd/yyyy) as indicated and click Submit. You will be taken to the next sign-up page. 5. Create a [employer] ID. This will be your [employer] login ID and cannot be changed, so think of one that is secure and easy to remember. 6. Create a [employer] password. You can change your password at any time. 7. Enter your Password Reset Question and Answer. This can be used at a later time if you forget your password. 8. Enter your e-mail address. You will receive e-mail notification when new information is available in [address] 9. Click Sign Up. You can now view and download portions of your medical record. 10. Click the Download Summary menu link to download a portable copy of your medical information. If you have questions, please visit the Frequently Asked Questions section of the [employer] website. Remember, [employer] is NOT to be used for urgent needs. For medical emergencies, dial 911. Now available from your iPhone and Android! General Information Please provide this summary of care documentation to your next provider. Patient Signature:  ____________________________________________________________ Date:  ____________________________________________________________  
  
Mamta Perham Provider Signature:  ____________________________________________________________ Date:  ____________________________________________________________ More Information After Your Delivery (the Postpartum Period): Care Instructions Your Care Instructions Congratulations on the birth of your baby. Like pregnancy, the  period can be a time of excitement, karlie, and exhaustion. You may look at your wondrous little baby and feel happy. You may also be overwhelmed by your new sleep hours and new responsibilities. At first, babies often sleep during the days and are awake at night. They do not have a pattern or routine. They may make sudden gasps, jerk themselves awake, or look like they have crossed eyes. These are all normal, and they may even make you smile. In these first weeks after delivery, try to take good care of yourself. It may take 4 to 6 weeks to feel like yourself again, and possibly longer if you had a  birth. You will likely feel very tired for several weeks. Your days will be full of ups and downs, but lots of karlie as well. Follow-up care is a key part of your treatment and safety. Be sure to make and go to all appointments, and call your doctor if you are having problems. It's also a good idea to know your test results and keep a list of the medicines you take. How can you care for yourself at home? Take care of your body after delivery · Use pads instead of tampons for the bloody flow that may last as long as 2 weeks. · Ease cramps with ibuprofen (Advil, Motrin). · Ease soreness of hemorrhoids and the area between your vagina and rectum with ice compresses or witch hazel pads. · Ease constipation by drinking lots of fluid and eating high-fiber foods. Ask your doctor about over-the-counter stool softeners. · Cleanse yourself with a gentle squeeze of warm water from a bottle instead of wiping with toilet paper. · Take a sitz bath in warm water several times a day. · Wear a good nursing bra. Ease sore and swollen breasts with warm, wet washcloths. · If you are not breastfeeding, use ice rather than heat for breast soreness. · Your period may not start for several months if you are breastfeeding. You may bleed more, and longer at first, than you did before you got pregnant. · Wait until you are healed (about 4 to 6 weeks) before you have sexual intercourse. Your doctor will tell you when it is okay to have sex. · Try not to travel with your baby for 5 or 6 weeks. If you take a long car trip, make frequent stops to walk around and stretch. Avoid exhaustion · Rest every day. Try to nap when your baby naps. · Ask another adult to be with you for a few days after delivery. · Plan for  if you have other children. · Stay flexible so you can eat at odd hours and sleep when you need to. Both you and your baby are making new schedules. · Plan small trips to get out of the house. Change can make you feel less tired. · Ask for help with housework, cooking, and shopping. Remind yourself that your job is to care for your baby. Know about help for postpartum depression · \"Baby blues\" are common for the first 1 to 2 weeks after birth. You may cry or feel sad or irritable for no reason. · Rest whenever you can. Being tired makes it harder to handle your emotions. · Go for walks with your baby. · Talk to your partner, friends, and family about your feelings. · If your symptoms last for more than a few weeks, or if you feel very depressed, ask your doctor for help. · Postpartum depression can be treated. Support groups and counseling can help. Sometimes medicine can also help. Stay healthy · Eat healthy foods so you have more energy, make good breast milk, and lose extra baby pounds. · If you breastfeed, avoid alcohol and drugs. Stay smoke-free. If you quit during pregnancy, congratulations. · Start daily exercise after 4 to 6 weeks, but rest when you feel tired. · Learn exercises to tone your belly. Do Kegel exercises to regain strength in your pelvic muscles. You can do these exercises while you stand or sit. ¨ Squeeze the same muscles you would use to stop your urine.  Your belly and thighs should not move. ¨ Hold the squeeze for 3 seconds, and then relax for 3 seconds. ¨ Start with 3 seconds. Then add 1 second each week until you are able to squeeze for 10 seconds. ¨ Repeat the exercise 10 to 15 times for each session. Do three or more sessions each day. · Find a class for new mothers and new babies that has an exercise time. · If you had a  birth, give yourself a bit more time before you exercise, and be careful. When should you call for help? Call 911 anytime you think you may need emergency care. For example, call if: 
· You passed out (lost consciousness). Call your doctor now or seek immediate medical care if: 
· You have severe vaginal bleeding. This means you are passing blood clots and soaking through a pad each hour for 2 or more hours. · You are dizzy or lightheaded, or you feel like you may faint. · You have a fever. · You have new belly pain, or your pain gets worse. Watch closely for changes in your health, and be sure to contact your doctor if: 
· Your vaginal bleeding seems to be getting heavier. · You have new or worse vaginal discharge. · You feel sad, anxious, or hopeless for more than a few days. · You do not get better as expected. Where can you learn more? Go to http://christiana-divine.info/. Enter A461 in the search box to learn more about \"After Your Delivery (the Postpartum Period): Care Instructions. \" Current as of: 2017 Content Version: 11.3 © 6615-4015 Healthwise, Incorporated. Care instructions adapted under license by [employer] (which disclaims liability or warranty for this information). If you have questions about a medical condition or this instruction, always ask your healthcare professional. [address] any warranty or liability for your use of this information.

## 2017-09-27 NOTE — L&D DELIVERY NOTE
Delivery Summary    Patient: Mary Cummins MRN: [de-identified]  SSN: [id number]    YOB: 1985  Age: 28 y.o. Sex: female        Labor Events:    Labor: No    Rupture Date:      Rupture Time:      Rupture Type      Amniotic Fluid Volume:      Amniotic Fluid Description:         Induction: None        Augmentation: AROM    Labor Complications: None     Additional Complications:        Cervical Ripening:       None      Delivery Events:  Episiotomy: None    Laceration(s): Right labial;Left labial      Repaired: Yes     Number of Repair Packets: 1    Suture Type and Size:         Estimated Blood Loss (ml):          Information for the patient's newbornAura Mora [[id number]]     Delivery Summary - Baby    Delivery Date: 2017   Delivery Time: 12:02 PM   Delivery Type: Vaginal, Spontaneous Delivery  Sex:  male  Gestational Age: 44w3d  Delivery Clinician:  Clara Rich  Living?: Living   Delivery Location: L&D             APGARS  One minute Five minutes Ten minutes   Skin Color: 1    1       Heart Rate: 2   2         Reflex Irritability: 2   2         Muscle Tone: 2   2       Respiration: 2   2         Total: 9   9           Presentation: Vertex  Position:   Occiput Anterior  Resuscitation Method:  Suctioning-bulb; Tactile Stimulation     Meconium Stained: None    Cord Information: 3 Vessels   Complications: None  Cord Blood Sent?:  Yes    Blood Gases Sent?:  No    Placenta:  Date/Time:  12:12 PM  Removal: Spontaneous      Appearance: Normal     [place] Measurements:  Birth Weight:      Birth Length:     Head Circumference:       Chest Circumference:      Abdominal Girth: Other Providers: NEL WEEKS;FLAVIO LEUNG;BLAIR YOUNG;SP[place]ER WEI CHARYL;PATI ROY Obstetrician;Primary Nurse;Nursery Nurse;Resident; Resident; Charge Nurse           Cord Blood Results:  Information for the patient's newbornAura Mora [[id number]]   No results found for: Andrea Skains, PCTDIG, BILI, ABORHEXT, [address] [place]    Information for the patient's newbornDardwong Valverde, Male [[id number]]   No results found for: APH, APCO2, APO2, AHCO3, ABEC, ABDC, O2ST, [place], [place], PHI, [place], PO2I, HCO3I, SO2I, IBD     Information for the patient's newbornDardwong Valverde, Male [[id number]]   No results found for: EPHV, PCO2V, PO2V, HCO3V, O2STV, EBDV

## 2017-09-27 NOTE — IP AVS SNAPSHOT
[address] 
[phone] Patient: Sarah Hall MRN: [id number] :1985 Current Discharge Medication List  
  
START taking these medications Dose & Instructions Dispensing Information Comments Morning Noon Evening Bedtime  
 ibuprofen 800 mg tablet Commonly known as:  MOTRIN Your last dose was: Your next dose is:    
   
   
 Dose:  800 mg Take 1 Tab by mouth every eight (8) hours as needed. Quantity:  30 Tab Refills:  0 CONTINUE these medications which have NOT CHANGED Dose & Instructions Dispensing Information Comments Morning Noon Evening Bedtime PRENATAL 19 (WITH DOCUSATE) 29-1 mg Tab Generic drug:  prenatal vit-fe fum-fa-dss Your last dose was: Your next dose is: Take  by mouth. Refills:  0 Where to Get Your Medications Information on where to get these meds will be given to you by the nurse or doctor. ! Ask your nurse or doctor about these medications  
  ibuprofen 800 mg tablet

## 2017-09-27 NOTE — PROGRESS NOTES
09/27/17 10:30 AM  CM met with patient, who was present with her parents and /FOB Kwabena ([phone]) to complete initial assessment and to begin discharge planning. Demographics were reviewed and confirmed. Patient and FOB live together in [place] and have three older children, ages 15, 9, and 3. Supports include patient's parents and other close friends in the area. Patient and FOB have a [id number] and FOB will continue working; family will assist patient and baby while FOB is working. Patient plans to breast feed and has a pump to use. Dr. Tamera Rosales will provide medical follow up for the baby. Patient has car seat, crib, clothing, and other necessary supplies. Ethan has Medicaid and understands the process to add the baby; she does not use [facility] services. Care Management Interventions  PCP Verified by CM:  Yes  Transition of Care Consult (CM Consult): Discharge Planning  Current Support Network: Lives with Spouse  Confirm Follow Up Transport: Family  Plan discussed with Pt/Family/Caregiver: Yes  Discharge Location  Discharge Placement: [address] [place] [address]

## 2017-09-27 NOTE — PROGRESS NOTES
Labor Progress Note  Patient seen, fetal heart rate and contraction pattern evaluated, patient examined. The patient was just got an epidural. She reports feeling much better after that. She felt more pressure. No data found. Physical Exam:  Cervical Exam:  Cervical Exam   Dilation (cm): 6 (6-7)  Eff: 100 %  Station: 0  Vaginal exam done by : Katelyn Juan RN  Membrane Status: Intact    Uterine Activity:contractions every 3 monutes  Fetal Heart Rate: Reactive  Baseline: 130 per minute  Variability: moderate  Accelerations: yes  Decelerations: none    Assessment/Plan:  Drea Kaur is a 28 y.o. [id number] at 40w1d admitted for Labor. 1. Continue current management, anticipate   2. Penicillin 5 mln units was given  3.  Desires the circumcision for the baby, will follow up with [facility]  Patient to be discussed with Dr. Shahbaz Mondragon ( the attending physician)   Mary Young MD  Family Medicine Resident

## 2017-09-27 NOTE — H&P
History & Physical    Name: Emerson Lentz MRN: [de-identified]  SSN: [id number]    YOB: 1985  Age: 28 y.o. Sex: female        Subjective:     Estimated Date of Delivery: 17  OB History    Para Term  AB Living   4 3 3   3   SAB TAB Ectopic Molar Multiple Live Births        3      # Outcome Date GA Lbr Odilon/2nd Weight Sex Delivery Anes PTL Lv   4 Current            3 Term 13 39w5d  2.915 kg M VAGINAL DELI None N MARY   2 Term 02/16/10 40w0d  3.345 kg F VAGINAL DELI  N MARY   1 Term 05 40w0d  3.374 kg Balinda Polite          Ms. Shah is admitted with pregnancy at 40w1d for active labor. Prenatal course was normal. Please see prenatal records for details. Past Medical History:   Diagnosis Date    Irregular menses     PCOS (polycystic ovarian syndrome)      History reviewed. No pertinent surgical history. Social History     Occupational History    Not on file. Social History Main Topics    Smoking status: Never Smoker    Smokeless tobacco: Never Used    Alcohol use No    Drug use: No    Sexual activity: Yes     Partners: Male     Family History   Problem Relation Age of Onset    Hypertension Mother     Stroke Mother     Other Mother      St[place] Syndrome    Asthma Father     Elevated Lipids Father     Other Sister      scolosis       No Known Allergies  Prior to Admission medications    Medication Sig Start Date End Date Taking? Authorizing Provider   prenatal vit-fe fum-fa-dss (PRENATAL 19) 29-1 mg Tab Take  by mouth. Historical Provider        Review of Systems: Constitutional: negative  Respiratory: negative  Cardiovascular: negative  Gastrointestinal: negative  Genitourinary:negative  Hematologic/lymphatic: negative  Musculoskeletal:negative  Neurological: negative    Objective:     Vitals:  Vitals:    17 0646   Weight: 89.8 kg (198 lb)   Height: 5' 3\" (1.6 m)        Physical Exam:  Patient without distress.   Heart: Regular rate and rhythm  Lung: clear to auscultation throughout lung fields and normal respiratory effort  Abdomen: soft, nontender  Perineum: blood absent, amniotic fluid absent  Cervical Exam: 4-5/709/-2  Membranes:  Intact  Fetal Heart Rate: Reactive    Prenatal Labs:   Lab Results   Component Value Date/Time    ABO/Rh(D) O POS 2009 09:55 AM    Rubella, External immune 2013    GrBStrep, External neg 2013    HBsAg, External neg 2013    HIV, External NR 2013    RPR, External neg 2013    Gonorrhea, External NEG 2013    Chlamydia, External NEG 2013    ABO,Rh O Pos 2013         Assessment/Plan:     Active Problems:    Pregnancy (2017)         Plan: Admit for Reassuring fetal status. Group B Strep was positive.  tx with PCN    Signed By:  Trice Thorpe MD     2017

## 2017-09-27 NOTE — ROUTINE PROCESS
Bedside and Verbal shift change report given to nellie ocampo rn (oncoming nurse) by elisha guallpa rn (offgoing nurse). Report included the following information SBAR, Kardex, Procedure Summary, Intake/Output, MAR, Accordion and Recent Results.

## 2017-09-27 NOTE — ANESTHESIA PROCEDURE NOTES
Epidural Block    Start time: 9/27/2017 8:08 AM  End time: 9/27/2017 8:18 AM  Performed by: Deyanira Sena  Authorized by: Deyanira Sena     Pre-Procedure  Indication: labor epidural    Preanesthetic Checklist: patient identified, risks and benefits discussed, anesthesia consent, timeout performed and anesthesia consent      Epidural:   Patient position:  Seated  Prep region:  Lumbar  Prep: Chlorhexidine    Location:  L3-4    Needle and Epidural Catheter:   Needle Type:  Tuohy  Needle Gauge:  17 G  Injection Technique:  Loss of resistance using air  Attempts:  1  Catheter Size:  18 G  Events: no blood with aspiration, no cerebrospinal fluid with aspiration, no paresthesia and negative aspiration test    Test Dose:  Lidocaine 1.5% w/ epi and negative    Assessment:   Catheter Secured:  Tegaderm and tape  Insertion:  Uncomplicated  Patient tolerance:  Patient tolerated the procedure well with no immediate complications

## 2017-09-27 NOTE — ANESTHESIA PREPROCEDURE EVALUATION
Anesthetic History   No history of anesthetic complications            Review of Systems / Medical History  Patient summary reviewed, nursing notes reviewed and pertinent labs reviewed    Pulmonary  Within defined limits                 Neuro/Psych   Within defined limits           Cardiovascular  Within defined limits                Exercise tolerance: >4 METS  Comments: Not on beta blocker   GI/Hepatic/Renal  Within defined limits              Endo/Other  Within defined limits           Other Findings              Physical Exam    Airway  Mallampati: II  TM Distance: 4 - 6 cm  Neck ROM: normal range of motion   Mouth opening: Normal     Cardiovascular  Regular rate and rhythm,  S1 and S2 normal,  no murmur, click, rub, or gallop  Rhythm: regular  Rate: normal         Dental  No notable dental hx       Pulmonary  Breath sounds clear to auscultation               Abdominal  GI exam deferred       Other Findings            Anesthetic Plan    ASA: 2  Anesthesia type: epidural            Anesthetic plan and risks discussed with: Patient

## 2017-09-27 NOTE — LACTATION NOTE
Problem: Lactation Care Plan  Goal: *Infant latching appropriately  Outcome: Progressing Towards Goal  Mom sstates baby is nursing well. States this is her fourth child, and nursed one at least fro 21 months, shorted for about 5 months. Pt will successfully establish breastfeeding by feeding in response to early feeding cues   or wake every 3h, will obtain deep latch, and will keep log of feedings/output. Taught to BF at hunger cues and or q 2-3 hrs and to offer 10-20 drops of hand expressed colostrum at any non-feeds.        Breast Assessment  Left Breast: Medium  Left Nipple: Everted, Intact  Right Breast: Medium  Right Nipple: Everted, Intact  Breast- Feeding Assessment  Attends Breast-Feeding Classes: No  Breast-Feeding Experience: Yes (nursed up to 18 month, this is her fourth child)  Breast Trauma/Surgery: No  Type/Quality: Good (per mom, not seen at breast)                Goal: *Weight loss less than 10% of birth weight  Outcome: Progressing Towards Goal      Encouraged mom to attempt feeding with baby led feeding cues. Just as sucking on fingers, rooting, mouthing. Looking for 8-12 feedings in 24 hours. Don't limit baby at breast, allow baby to come of breast on it's own. Baby may want to feed  often and may increase number of feedings on second day of life. Skin to skin encouraged.        If baby doesn't nurse,  Mom should  hand express  10-20 drops of colostrum and drip into baby's mouth, or give to baby by finger feeding, cup feeding, or spoon feeding at least every 2-3 hours.          Problem: Patient Education: Go to Patient Education Activity  Goal: Patient/Family Education  Outcome: Progressing Towards Goal  Reviewed breastfeeding basics:  Supply and demand,  stomach size, early  Feeding cues, skin to skin, positioning and baby led latch-on, assymetrical latch with signs of good, deep latch vs shallow, feeding frequency and duration, and log sheet for tracking infant feedings and output. Breastfeeding Booklet and Warm line information given. Discussed typical  weight loss and the importance of infant weight checks with pediatrician 1-2 post discharge.      Hand Expression Education:  Mom comfortable with  hand expressing her colostrum. Emphasized the importance of providing infant with valuable colostrum as infant rests skin to skin at breast.  Aware to avoid extended periods of non-feeding. Aware to offer 10-20+ drops of colostrum every 2-3 hours until infant is latching and nursing effectively. Taught the rationale behind this low tech but highly effective evidence based practice.      Discussed with mother her plan for feeding. Reviewed the benefits of exclusive breast milk feeding during the hospital stay. Informed her of the risks of using formula to supplement in the first few days of life as well as the benefits of successful breast milk feeding; referred her to the Breastfeeding booklet about this information. She acknowledges understanding of information reviewed and states that it is her plan to breast feed her infant. Will support her choice and offer additional information as needed.

## 2017-09-28 PROCEDURE — 74011250637 HC RX REV CODE- 250/637: Performed by: FAMILY MEDICINE

## 2017-09-28 PROCEDURE — 65270000029 HC RM PRIVATE

## 2017-09-28 RX ADMIN — IBUPROFEN 800 MG: 800 TABLET, FILM COATED ORAL at 14:00

## 2017-09-28 RX ADMIN — IBUPROFEN 800 MG: 800 TABLET, FILM COATED ORAL at 06:05

## 2017-09-28 RX ADMIN — IBUPROFEN 800 MG: 800 TABLET, FILM COATED ORAL at 21:38

## 2017-09-28 RX ADMIN — MUPIROCIN: 20 OINTMENT TOPICAL at 12:13

## 2017-09-28 NOTE — LACTATION NOTE
Problem: Lactation Care Plan  Goal: *Infant latching appropriately  Outcome: Progressing Towards Goal  Mom states baby is nursing well. Not seen at breast.  Goal: *Weight loss less than 10% of birth weight  Outcome: Progressing Towards Goal      Encouraged mom to attempt feeding with baby led feeding cues. Just as sucking on fingers, rooting, mouthing. Looking for 8-12 feedings in 24 hours. Don't limit baby at breast, allow baby to come of breast on it's own. Baby may want to feed  often and may increase number of feedings on second day of life. Skin to skin encouraged.        If baby doesn't nurse,  Mom should  hand express  10-20 drops of colostrum and drip into baby's mouth, or give to baby by finger feeding, cup feeding, or spoon feeding at least every 2-3 hours.          Problem: Patient Education: Go to Patient Education Activity  Goal: Patient/Family Education  Outcome: Progressing Towards Goal  Mom states she is very comfortable with nursing. Reviewed breastfeeding basics:  Supply and demand,  stomach size, early  Feeding cues, skin to skin, positioning and baby led latch-on,  latched with signs of good, deep latch vs shallow, feeding frequency and duration, and log sheet for tracking infant feedings and output. Breastfeeding Booklet and Warm line information given.   Discussed typical  weight loss and the importance of infant weight checks with pediatrician 1-2 post discharge.

## 2017-09-28 NOTE — PROGRESS NOTES
Post-Partum Day Number 1 Progress Note    Patient doing well post-partum without significant complaint. Pain well controlled. Lochia normal.  Tolerating  diet. Ambulating. Voiding without difficulty. + flatus. Denies BM. Vitals:    Patient Vitals for the past 8 hrs:   BP Temp Pulse Resp   17 0716 126/78 98.6 °F (37 °C) (!) 53 16     Temp (24hrs), Av.4 °F (36.9 °C), Min:98 °F (36.7 °C), Max:98.6 °F (37 °C)      Exam:  Patient without distress. CTAB, no w/r/r/c.               RRR, +S1 and S2, no m/r/g. Abdomen soft, fundus firm below umbilicus, nontender. Perineum with normal lochia noted. Lower extremities:  No edema. No palpable cords or tenderness. Lab/Data Review:  No results found for this or any previous visit (from the past 12 hour(s)). Assessment and Plan:    Loreta Saab is a 28 y.o.  s/p  at 40 weeks 1 days. Patient appears to be having uncomplicated post-partum course. 1. Continue routine perineal care and maternal education. 2. Plan discharge tomorrow if no problems occur. Patient will be discussed with Dr. Spring Triana.                  Glenetta Sicard, MD  Family Medicine Resident

## 2017-09-28 NOTE — ROUTINE PROCESS
Bedside and Verbal shift change report given to alvin powers rn (oncoming nurse) by elisha guallpa rn (offgoing nurse). Report included the following information SBAR, Kardex, Procedure Summary, Intake/Output, MAR, Accordion and Recent Results.

## 2017-09-28 NOTE — ROUTINE PROCESS
Bedside and Verbal shift change report given to Chio Nickerson RN (oncoming nurse) by Deloris Frey RN (offgoing nurse).  Report included the following information SBAR, Kardex, Intake/Output, MAR,

## 2017-09-29 VITALS
RESPIRATION RATE: 16 BRPM | WEIGHT: 198 LBS | OXYGEN SATURATION: 100 % | BODY MASS INDEX: 35.08 KG/M2 | DIASTOLIC BLOOD PRESSURE: 55 MMHG | HEIGHT: 63 IN | SYSTOLIC BLOOD PRESSURE: 101 MMHG | TEMPERATURE: 98.5 F | HEART RATE: 63 BPM

## 2017-09-29 PROCEDURE — 74011250637 HC RX REV CODE- 250/637: Performed by: FAMILY MEDICINE

## 2017-09-29 RX ORDER — IBUPROFEN 800 MG/1
800 TABLET ORAL
Qty: 30 TAB | Refills: 0 | Status: SHIPPED | OUTPATIENT
Start: 2017-09-29 | End: 2017-11-10

## 2017-09-29 RX ADMIN — IBUPROFEN 800 MG: 800 TABLET, FILM COATED ORAL at 05:55

## 2017-09-29 NOTE — DISCHARGE INSTRUCTIONS
For pain: Take Motrin 800 mg every 8 hours if needed    [address]    Name: Vivien Jiang  YOB: 1985  Primary Diagnosis: Active Problems:    Pregnancy (2017)        General:     Diet/Diet Restrictions:  Eight 8-ounce glasses of fluid daily (water, juices); avoid excessive caffeine intake. Meals/snacks as desired which are high in fiber and carbohydrates and low in fat and cholesterol. Physical Activity / Restrictions / Safety:     Avoid heavy lifting, no more that 8 lbs. For 2-3 weeks; limit use of stairs to 2 times daily for the first week home. No driving for one week. Avoid intercourse 4-6 weeks, no douching or tampon use. Check with obstetrician before starting or resuming an exercise program.         Discharge Instructions/Special Treatment/Home Care Needs:     Continue prenatal vitamins. Continue to use squirt bottle with warm water on your episiotomy after each bathroom use until bleeding stops. If steri-strips applied to your incision, remove in 7-10 days. Call your doctor for the following:     Fever over 101 degrees by mouth. Vaginal bleeding heavier than a normal menstrual period or clot larger than a golf ball. Red streaks or increased swelling of legs, painful red streaks on your breast.  Painful urination, constipation and increased pain or swelling or discharge with your incision. If you feel extremely anxious or overwhelmed. If you have thoughts of harming yourself and/or your baby. Pain Management:     Pain Management:   Take Acetaminophen (Tylenol) or Ibuprofen (Advil, Motrin), as directed for pain. Use a warm Sitz bath 3 times daily to relieve episiotomy or hemorrhoidal discomfort. Heating pad to  incision as needed. For hemorrhoidal discomfort, use Tucks and Anusol cream as needed and directed. Follow-Up Care:      These are general instructions for a healthy lifestyle:    No smoking/ No tobacco products/ Avoid exposure to second hand smoke    Surgeon General's Warning:  Quitting smoking now greatly reduces serious risk to your health. Obesity, smoking, and sedentary lifestyle greatly increases your risk for illness    A healthy diet, regular physical exercise & weight monitoring are important for maintaining a healthy lifestyle    Recognize signs and symptoms of STROKE:    F-face looks uneven    A-arms unable to move or move unevenly    S-speech slurred or non-existent    T-time-call 911 as soon as signs and symptoms begin-DO NOT go       Back to bed or wait to see if you get better-TIME IS BRAIN. MyChart Activation    Thank you for requesting access to [employer]. Please follow the instructions below to securely access and download your online medical record. [employer] allows you to send messages to your doctor, view your test results, renew your prescriptions, schedule appointments, and more. How Do I Sign Up? 1. In your internet browser, go to [email or web address]  2. Click on the First Time User? Click Here link in the Sign In box. You will be redirect to the New Member Sign Up page. 3. Enter your [employer] Access Code exactly as it appears below. You will not need to use this code after youve completed the sign-up process. If you do not sign up before the expiration date, you must request a new code. [employer] Access Code: [email or web address]  Expires: 2017  4:38 PM (This is the date your [employer] access code will )    4. Enter the last four digits of your Social Security Number (xxxx) and Date of Birth (mm/dd/yyyy) as indicated and click Submit. You will be taken to the next sign-up page. 5. Create a [employer] ID. This will be your [employer] login ID and cannot be changed, so think of one that is secure and easy to remember. 6. Create a [employer] password. You can change your password at any time. 7. Enter your Password Reset Question and Answer.  This can be used at a later time if you forget your password. 8. Enter your e-mail address. You will receive e-mail notification when new information is available in [address] 9. Click Sign Up. You can now view and download portions of your medical record. 10. Click the Download Summary menu link to download a portable copy of your medical information. Additional Information    If you have questions, please visit the Frequently Asked Questions section of the [employer] website at [email or web address] Remember, [employer] is NOT to be used for urgent needs. For medical emergencies, dial 911.               Signed By: Anai Laws RN                                                                                                   Date: 9/29/2017 Time: 9:36 AM

## 2017-09-29 NOTE — PROGRESS NOTES
46- SBAR report received, pt food tray at bedside, no needs at this time  0800- assessment complete, WDL; no needs expressed  0920- patient resting in bed, plans to feed infant upon return  1000- infant returned to mother after circumcision, no needs at this time  1150- All discharge instructions explained, no further questions or needs  Pt off unit in stable condition via wheelchair with volunteers for discharge home per Dr. Alan Peng. Pt is to follow-up in 6 weeks and is aware. Prescriptions given to pt. Pt. Denies any HA, dizziness, NV, or pain at this time. Infant in car seat with mother.

## 2017-09-29 NOTE — LACTATION NOTE
Veena Lutz Lactation Consultant Signed  Progress Notes Date of Service: 17 1015         Problem: Lactation Care Plan  Goal: *Infant latching appropriately  Outcome: Resolved/Met Date Met:  17  Pt will successfully establish breastfeeding by feeding in response to infant's early feeding cues and/or to offer breast every 2-3 hours. Ways to obtain a deep latch and seek comfortable positioning shared, aware to keep log of feedings/output. Goal: *Weight loss less than 10% of birth weight  Outcome: Resolved/Met Date Met:  17  Infant weight loss is -6.8%  Reviewed breastfeeding basics:  Supply and demand,breastfeed baby 8-12 times in 24 hr.,   stomach size, early  Feeding cues, skin to skin, positioning and baby led latch-on, assymetrical latch with signs of good, deep latch vs shallow, feeding frequency and duration, and log sheet for tracking infant feedings and output. Breastfeeding Booklet and Warm line information given. Discussed typical  weight loss and the importance of infant weight checks with pediatrician 1-2 post discharge.     Problem: Patient Education: Go to Patient Education Activity  Goal: Patient/Family Education  Outcome: Resolved/Met Date Met:  17  Engorgement Care Guidelines:  Reviewed how milk is made and normal phases of milk production. Taught care of engorged breasts - frequent breastfeeding encouraged, cool packs and motrin as tolerated. Anticipatory guidance shared.       Care for sore/tender nipples discussed:  ways to improve positioning and latch practiced and discussed, hand express colostrum after feedings and let air dry, light application of lanolin, hydrogel pads, seek comfortable laid back feeding position, start feedings on least sore side first.      Discussed eating a healthy diet. Instructed mother to eat a variety of foods in order to get a well balanced diet.  She should consume an extra 500 calories per day (more than her non-pregnant requirement.) These extra calories will help provide energy needed for optimal breast milk production. Mother also encouraged to \"drink to thirst\" and it is recommended that she drink fluids such as water, fruit/vegetable juice. Nutritious snacks should be available so that she can eat throughout the day to help satisfy her hunger and maintain a good milk supply.      Chart shows numerous feedings, void, stool WNL. Discussed importance of monitoring outputs and feedings on first week of life. Discussed ways to tell if baby is  getting enough breast milk, ie  voids and stools, change in color of stool, and return to birth wt within 2 weeks. Follow up with pediatrician visit for weight check in 1-2 days (per AAP guidelines.)  Encouraged to call Warm Line  [phone] or [facility] at [phone] for any questions/problems that arise. Mother also given breastfeeding support group dates and times for any future needs     Comments:   Pt will successfully establish breastfeeding by feeding in response to early feeding cues   or wake every 3h, will obtain deep latch, and will keep log of feedings/output.   Taught to BF at hunger cues and or q 2-3 hrs and to offer 10-20 drops of hand expressed colostrum at any non-feeds.        Breast Assessment  Left Breast: Medium  Left Nipple: Everted, Intact  Right Breast: Medium  Right Nipple: Everted  Breast- Feeding Assessment  Attends Breast-Feeding Classes: No  Breast-Feeding Experience: No  Breast Trauma/Surgery: No  Type/Quality: Good  Lactation Consultant Visits  Breast-Feedings: Good  (Baby was latched on well during visit and was breastfeeding vigorously)  Mother/Infant Observation  Mother Observation: Alignment, Breast comfortable, Close hold, Holds breast, Recognizes feeding cues  Infant Observation: Audible swallows, Latches nipple and aereolae, Lips flanged, lower, Lips flanged, upper, Opens mouth, Rhythmic suck  LATCH Documentation  Latch: Grasps breast, tongue down, lips flanged, rhythmic sucking  Audible Swallowing: Spontaneous and intermittent (24 hours old)  Type of Nipple: Everted (after stimulation)  Comfort (Breast/Nipple): Soft/non-tender  Hold (Positioning): No assist from staff, mother able to position/hold infant  LATCH Score: 10

## 2017-09-29 NOTE — DISCHARGE SUMMARY
Obstetrical Discharge Summary     Name: Jun Cuenca MRN: [[id number]]  SSN: [id number]    YOB: 1985  Age: 28 y.o. Sex: female      Admit Date: 2017    Discharge Date: 2017     Admitting Physician: Navya Callejas MD     Attending Physician:  Navya Callejas MD     Admission Diagnoses: Pregnancy  Pregnancy    Discharge Diagnoses:   Information for the patient's :  Marlo Hewitt, Male [[id number]]   Delivery of a 3.585 kg male infant via Vaginal, Spontaneous Delivery on 2017 at 12:02 PM  by . Apgars were 9 and 9. Additional Diagnoses:   Hospital Problems  Date Reviewed: 2017          Codes Class Noted POA    Pregnancy ICD-10-CM: Z33.1  ICD-9-CM: V22.2  2017 Unknown             Lab Results   Component Value Date/Time    Rubella, External immune 02/10/2017    GrBStrep, External Positive 2017       Immunization(s):   Immunization History   Administered Date(s) Administered    Influenza Vaccine (Quad) PF 2017    Tdap 2017        Hospital Course: Patient is a 28 y.o. [id number] s/p  at 40 weeks 1 days.  Presented for Active Labor in setting of contractions. Pregnancy uncomplicated. Labor was complicated by GBS+ status. Delivered TLMI by  without complications. Had bilateral superficial labial lacerations which were repaired.  Normal hospital course following the delivery.  On day of discharge patient reported minimal lochia, well controlled pain, and no other complaints.  Discharged with pain regimen and bowel regimen. Advised to continue prenatal vitamins.  Follow up with OB/PCP in 6 weeks. Condition at Discharge:  stable    Patient Instructions:   Current Discharge Medication List      START taking these medications    Details   ibuprofen (MOTRIN) 800 mg tablet Take 1 Tab by mouth every eight (8) hours as needed.   Qty: 30 Tab, Refills: 0         CONTINUE these medications which have NOT CHANGED    Details   prenatal vit-fe fum-fa-dss (PRENATAL 19) 29-1 mg Tab Take  by mouth. Associated Diagnoses: Pregnancy test performed, pregnancy confirmed             Reference my discharge instructions.     Follow-up Appointments   Procedures    CALL FOR FOLLOW UP VISIT Appointment in: [address]  [phone]     Standing Status:   Standing     Number of Occurrences:   1     Order Specific Question:   Appointment in     Answer:   6 Weeks        Signed by: Jessica Cruz MD  Resident, PGY-1    September 29, 2017   9:42 AM

## 2017-09-29 NOTE — PROGRESS NOTES
Post-Partum Day Number 2 Progress/Discharge Note    Patient doing well post-partum without significant complaint. Pain well controlled. Lochia normal.  Tolerating  diet. Ambulating. Voiding without difficulty. + flatus. Denies BM. Vitals:    Patient Vitals for the past 8 hrs:   BP Temp Pulse Resp   17 0648 101/55 98.5 °F (36.9 °C) 63 16     Temp (24hrs), Av.3 °F (36.8 °C), Min:98 °F (36.7 °C), Max:98.5 °F (36.9 °C)      Vital signs stable, afebrile. Exam:  Patient without distress. CTAB, no w/r/r/c               RRR, + S1 and S2, no m/r/g               Abdomen soft, fundus firm below umbilicus, non tender               Perineum with normal lochia noted. Lower extremities are negative for swelling, cords or tenderness. Lab/Data Review:  No results found for this or any previous visit (from the past 12 hour(s)). Assessment and Plan:      Cesar Cuello is a 28 y.o. [id number] s/p  at 40 weeks 1 days. Patient appears to be having uncomplicated post-partum course. PNL: O+, GBS + (treated with 2 doses of Penicillin), G/C neg, HBsAg neg, HIV non reactive, RPR non reactive, Rubella and VZV immune. 1. Continue routine perineal care and maternal education. 2. Plan discharge for today with follow up in office in 6 weeks. Patient will be discussed with Dr. Tayler Bourgeois.     April Euceda MD  Family Medicine Resident

## 2017-11-10 ENCOUNTER — ROUTINE PRENATAL (OUTPATIENT)
Dept: FAMILY MEDICINE CLINIC | Age: 32
End: 2017-11-10

## 2017-11-10 VITALS
RESPIRATION RATE: 16 BRPM | HEIGHT: 63 IN | OXYGEN SATURATION: 98 % | BODY MASS INDEX: 31.89 KG/M2 | WEIGHT: 180 LBS | DIASTOLIC BLOOD PRESSURE: 72 MMHG | TEMPERATURE: 98.6 F | SYSTOLIC BLOOD PRESSURE: 110 MMHG | HEART RATE: 60 BPM

## 2017-11-10 DIAGNOSIS — Z30.09 FAMILY PLANNING: ICD-10-CM

## 2017-11-10 PROBLEM — Z34.83 NORMAL PREGNANCY IN MULTIGRAVIDA IN THIRD TRIMESTER: Status: RESOLVED | Noted: 2017-07-11 | Resolved: 2017-11-10

## 2017-11-10 PROBLEM — Z34.90 PREGNANCY: Status: RESOLVED | Noted: 2017-09-27 | Resolved: 2017-11-10

## 2017-11-10 PROBLEM — B95.1 POSITIVE GBS TEST: Status: RESOLVED | Noted: 2017-09-05 | Resolved: 2017-11-10

## 2017-11-10 RX ORDER — ACETAMINOPHEN AND CODEINE PHOSPHATE 120; 12 MG/5ML; MG/5ML
1 SOLUTION ORAL DAILY
Qty: 1 PACKAGE | Refills: 12 | Status: SHIPPED | OUTPATIENT
Start: 2017-11-10 | End: 2018-10-16 | Stop reason: SDUPTHER

## 2017-11-10 NOTE — PROGRESS NOTES
Subjective:   Ms. Esteban Reaves is a 28 y.o. who is now 6 weeks postpartum. OB History      Para Term  AB Living    4 4 4   4    SAB TAB Ectopic Molar Multiple Live Births        0 4        Method of delivery: normal spontaneous vaginal delivery  She is breast-feeding and is not experiencing problems. Pregnancy complications: none. She is feeling well and happy. She currently uses no method for contraception. She plans to use oral progesterone-only contraceptive for contraception. Patient Active Problem List   Diagnosis Code    Postpartum care and examination Z39.2    Family planning advice Z30.09     Current Outpatient Prescriptions   Medication Sig Dispense Refill    norethindrone (MICRONOR) 0.35 mg tab Take 1 Tab by mouth daily. 1 Package 12    prenatal vit-fe fum-fa-dss (PRENATAL 19) 29-1 mg Tab Take  by mouth. No Known Allergies     Objective:   Physical Exam:  Date of last Pap smear: 2017  Physical Exam: GENERAL APPEARANCE: alert, well appearing, in no apparent distress    Assessment/Plan:   normal postpartum exam  Contraceptive counseling for oral progesterone-only contraceptive     ICD-10-CM ICD-9-CM    1. Postpartum care and examination Z39.2 V24.2    2.  Family planning Z30.09 V25.09    Return in 2018 for annual exam.

## 2018-06-17 ENCOUNTER — APPOINTMENT (OUTPATIENT)
Dept: GENERAL RADIOLOGY | Age: 33
End: 2018-06-17
Attending: EMERGENCY MEDICINE
Payer: SELF-PAY

## 2018-06-17 ENCOUNTER — HOSPITAL ENCOUNTER (EMERGENCY)
Age: 33
Discharge: HOME | End: 2018-06-17
Attending: EMERGENCY MEDICINE
Payer: SELF-PAY

## 2018-06-17 VITALS
TEMPERATURE: 98.2 F | WEIGHT: 170 LBS | HEIGHT: 64 IN | DIASTOLIC BLOOD PRESSURE: 87 MMHG | BODY MASS INDEX: 29.02 KG/M2 | RESPIRATION RATE: 14 BRPM | HEART RATE: 57 BPM | SYSTOLIC BLOOD PRESSURE: 138 MMHG | OXYGEN SATURATION: 98 %

## 2018-06-17 DIAGNOSIS — M25.511 ACUTE PAIN OF RIGHT SHOULDER: Primary | ICD-10-CM

## 2018-06-17 PROCEDURE — 99283 EMERGENCY DEPT VISIT LOW MDM: CPT

## 2018-06-17 PROCEDURE — 73030 X-RAY EXAM OF SHOULDER: CPT

## 2018-06-17 PROCEDURE — 74011250637 HC RX REV CODE- 250/637: Performed by: NURSE PRACTITIONER

## 2018-06-17 PROCEDURE — A4565 SLINGS: HCPCS

## 2018-06-17 RX ORDER — IBUPROFEN 600 MG/1
600 TABLET ORAL
Status: COMPLETED | OUTPATIENT
Start: 2018-06-17 | End: 2018-06-17

## 2018-06-17 RX ORDER — IBUPROFEN 600 MG/1
600 TABLET ORAL
Qty: 20 TAB | Refills: 0 | Status: SHIPPED | OUTPATIENT
Start: 2018-06-17 | End: 2019-02-24

## 2018-06-17 RX ADMIN — IBUPROFEN 600 MG: 600 TABLET ORAL at 10:32

## 2018-06-17 NOTE — ED PROVIDER NOTES
HPI Comments: Patient is a 72-year-old female without significant past medical history he was a blister to the ED today with complaints of right shoulder pain. Patient states for the last 3-4 days she's had right upper back and shoulder pain. 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 Patient diminished she felt a pop in her upper back. Since then she's had difficulty pulling up her pants and having difficulty picking up, pushing and pulling objects. She has no trouble grabbing an opening door handles however. Patient states she is minimal pain when sitting up at rest however does have pain when lying down. Patient has tried Tylenol and to rule out without any relief. Patient denies fever, chills, nausea, vomiting, chest pain or shortness of breath. Patient takes oral contraception daily and has no allergies to any medications. Primary care Tyrone Wahl MD      The history is provided by the patient. Past Medical History:   Diagnosis Date    Irregular menses     PCOS (polycystic ovarian syndrome)        No past surgical history on file. Family History:   Problem Relation Age of Onset    Hypertension Mother     Stroke Mother     Other Mother      Sturge Radford Syndrome    Asthma Father     Elevated Lipids Father     Other Sister      scolosis       Social History     Social History    Marital status:      Spouse name: N/A    Number of children: N/A    Years of education: N/A     Occupational History    Not on file. Social History Main Topics    Smoking status: Never Smoker    Smokeless tobacco: Never Used    Alcohol use No    Drug use: No    Sexual activity: Yes     Partners: Male     Other Topics Concern    Not on file     Social History Narrative         ALLERGIES: Review of patient's allergies indicates no known allergies.     Review of Systems   Constitutional: Negative for appetite change, chills and fever. HENT: Negative. Respiratory: Negative for cough, shortness of breath and wheezing. Cardiovascular: Negative for chest pain. Gastrointestinal: Negative for abdominal pain, constipation, diarrhea, nausea and vomiting. Genitourinary: Negative for dysuria and urgency. Musculoskeletal: Positive for arthralgias, back pain and myalgias. Negative for neck pain. Skin: Negative for color change and rash. Neurological: Negative for dizziness and headaches. Psychiatric/Behavioral: Negative. All other systems reviewed and are negative. Vitals:    06/17/18 0942   BP: 138/87   Pulse: (!) 57   Resp: 14   Temp: 98.2 °F (36.8 °C)   SpO2: 98%   Weight: 77.1 kg (170 lb)   Height: 5' 4\" (1.626 m)            Physical Exam   Constitutional: She appears well-developed and well-nourished. HENT:   Head: Atraumatic. Eyes: EOM are normal.   Neck: No tracheal deviation present. Pulmonary/Chest: Effort normal. No respiratory distress. Musculoskeletal: She exhibits tenderness. She exhibits no edema or deformity. Right shoulder: She exhibits decreased range of motion, tenderness, crepitus and pain. She exhibits no bony tenderness, no swelling, no effusion, no spasm and normal strength. Arms:  RIGHT SHOULDER: Difficulty with lateral arm raise above the shoulder height. Able to passively move arm up to ear. Minimal pain with moving the arm across the chest. Minimal crepitus, no clunks on ROM. No deformity. Intact +2 distal pulses. Neurological: She is alert. Skin: Skin is warm and dry. Psychiatric: She has a normal mood and affect. Her behavior is normal. Judgment and thought content normal.   Nursing note and vitals reviewed.        [facility]      ED Course     Assessment & Plan:     Orders Placed This Encounter    XR SHOULDER RT AP/LAT MIN 2 V    ibuprofen (MOTRIN) tablet 600 mg       Discussed with Yumiko Chávez MD,ED Provider    Alaina Alcazar NP  06/17/18  10:06 AM    Procedures    No acute findings on films. Ibuprofen for pain. Sling for comfort. Advised to remove arm frequently from sling and perform ROM to prevent a frozen shoulder. Follow-up with [facility] at office or walk-in center. Could have an acute bursitis vs a rotator cuff tear. 10:53 AM  The patient has been reevaluated. The patient is ready for discharge. The patient's signs, symptoms, diagnosis, and discharge instructions have been discussed and the patient/ family has conveyed their understanding. The patient is to follow up as recommended or return to the ED should their symptoms worsen. Plan has been discussed and the patient is in agreement. LABORATORY TESTS:  Labs Reviewed - No data to display    IMAGING RESULTS:  Xr Shoulder Rt Ap/lat Min 2 V    Result Date: 6/17/2018  EXAM:  XR SHOULDER RT AP/LAT MIN 2 V INDICATION:   pain. Right shoulder and upper back pain after performing demolition work since Thursday. COMPARISON: None. FINDINGS: Three views of the right shoulder demonstrate no fracture, dislocation or other acute abnormality. IMPRESSION:  No acute abnormality. MEDICATIONS GIVEN:  Medications   ibuprofen (MOTRIN) tablet 600 mg (600 mg Oral Given 6/17/18 1032)       IMPRESSION:  1. Acute pain of right shoulder        PLAN:  1. Current Discharge Medication List      START taking these medications    Details   ibuprofen (MOTRIN) 600 mg tablet Take 1 Tab by mouth every six (6) hours as needed for Pain. Indications: Pain  Qty: 20 Tab, Refills: 0           2.    Follow-up Information     Follow up With Details Comments Contact Info    [facility] Schedule an appointment as soon as possible for a visit for continued work-up pf right shoulder pain [address]  [facility]  [phone]    [facility] [address]  [address]    Asha Johnson MD Schedule an appointment as soon as possible for a visit As needed [address]      [address] [facility] EMERGENCY DEPT  As needed, If symptoms worsen [facility]  [phone]        3.      Return to ED for new or worsening symptoms       Elena Mendenhall, NP

## 2018-06-17 NOTE — DISCHARGE INSTRUCTIONS
Shoulder Pain: Care Instructions  Your Care Instructions    You can hurt your shoulder by using it too much during an activity, such as fishing or baseball. It can also happen as part of the everyday wear and tear of getting older. Shoulder injuries can be slow to heal, but your shoulder should get better with time. Your doctor may recommend a sling to rest your shoulder. If you have injured your shoulder, you may need testing and treatment. Follow-up care is a key part of your treatment and safety. Be sure to make and go to all appointments, and call your doctor if you are having problems. It's also a good idea to know your test results and keep a list of the medicines you take. How can you care for yourself at home? · Take pain medicines exactly as directed. ¨ If the doctor gave you a prescription medicine for pain, take it as prescribed. ¨ If you are not taking a prescription pain medicine, ask your doctor if you can take an over-the-counter medicine. ¨ Do not take two or more pain medicines at the same time unless the doctor told you to. Many pain medicines contain acetaminophen, which is Tylenol. Too much acetaminophen (Tylenol) can be harmful. · If your doctor recommends that you wear a sling, use it as directed. Do not take it off before your doctor tells you to. · Put ice or a cold pack on the sore area for 10 to 20 minutes at a time. Put a thin cloth between the ice and your skin. · If there is no swelling, you can put moist heat, a heating pad, or a warm cloth on your shoulder. Some doctors suggest alternating between hot and cold. · Rest your shoulder for a few days. If your doctor recommends it, you can then begin gentle exercise of the shoulder, but do not lift anything heavy. When should you call for help? Call 911 anytime you think you may need emergency care. For example, call if:  ? · You have chest pain or pressure. This may occur with:  ¨ Sweating.   ¨ Shortness of breath. ¨ Nausea or vomiting. ¨ Pain that spreads from the chest to the neck, jaw, or one or both shoulders or arms. ¨ Dizziness or lightheadedness. ¨ A fast or uneven pulse. After calling 911, chew 1 adult-strength aspirin. Wait for an ambulance. Do not try to drive yourself. ? · Your arm or hand is cool or pale or changes color. ?Call your doctor now or seek immediate medical care if:  ? · You have signs of infection, such as:  ¨ Increased pain, swelling, warmth, or redness in your shoulder. ¨ Red streaks leading from a place on your shoulder. ¨ Pus draining from an area of your shoulder. ¨ Swollen lymph nodes in your neck, armpits, or groin. ¨ A fever. ? Watch closely for changes in your health, and be sure to contact your doctor if:  ? · You cannot use your shoulder. ? · Your shoulder does not get better as expected. Where can you learn more? Go to http://christiana-divine.info/. Enter D983 in the search box to learn more about \"Shoulder Pain: Care Instructions. \"  Current as of: March 21, 2017  Content Version: 11.4  © 4704-6002 [employer]. Care instructions adapted under license by [employer] (which disclaims liability or warranty for this information). If you have questions about a medical condition or this instruction, always ask your healthcare professional. [place] [address] any warranty or liability for your use of this information.

## 2018-10-16 ENCOUNTER — OFFICE VISIT (OUTPATIENT)
Dept: FAMILY MEDICINE CLINIC | Age: 33
End: 2018-10-16

## 2018-10-16 VITALS
DIASTOLIC BLOOD PRESSURE: 63 MMHG | SYSTOLIC BLOOD PRESSURE: 99 MMHG | TEMPERATURE: 98.3 F | HEIGHT: 64 IN | HEART RATE: 65 BPM | BODY MASS INDEX: 27.66 KG/M2 | WEIGHT: 162 LBS | RESPIRATION RATE: 16 BRPM

## 2018-10-16 DIAGNOSIS — Z23 ENCOUNTER FOR IMMUNIZATION: ICD-10-CM

## 2018-10-16 DIAGNOSIS — Z01.419 WELL WOMAN EXAM: Primary | ICD-10-CM

## 2018-10-16 LAB
HCG URINE, QL. (POC): NEGATIVE
VALID INTERNAL CONTROL?: YES

## 2018-10-16 RX ORDER — ACETAMINOPHEN AND CODEINE PHOSPHATE 120; 12 MG/5ML; MG/5ML
1 SOLUTION ORAL DAILY
Qty: 1 PACKAGE | Refills: 12 | Status: SHIPPED | OUTPATIENT
Start: 2018-10-16 | End: 2020-10-20

## 2018-10-16 NOTE — PROGRESS NOTES
35year old female here for complete physical and medication refill    Hx kidney stone in 1/2018     Still breastfeeding -- on micronor    Got influenza injection today    I reviewed with the resident the medical history and the resident's findings on the physical examination. I discussed with the resident the patient's diagnosis and concur with the plan.

## 2018-10-16 NOTE — PROGRESS NOTES
Chief Complaint   Patient presents with    Medication Refill     birth control     1. Have you been to the ER, urgent care clinic since your last visit? Hospitalized since your last visit? No    2. Have you seen or consulted any other health care providers outside of the [address] [place] [address] since your last visit? Include any pap smears or colon screening.  No

## 2018-10-16 NOTE — MR AVS SNAPSHOT
[address] 
[phone] Patient: Russ Trammell MRN: [id number] :1985 Visit Information Date & Time Provider Department Dept. Phone Encounter #  
 10/16/2018  2:30 PM Kavita Collado MD [facility] [phone] [id number] Follow-up Instructions Return if symptoms worsen or fail to improve. Upcoming Health Maintenance Date Due Influenza Age 5 to Adult 2018 PAP AKA CERVICAL CYTOLOGY 2/10/2020 DTaP/Tdap/Td series (2 - Td) 2027 Allergies as of 10/16/2018  Review Complete On: 10/16/2018 By: Kavita Collado MD  
 No Known Allergies Current Immunizations  Reviewed on 2017 Name Date Influenza Vaccine (Quad) PF 10/16/2018, 2017 Tdap 2017 Not reviewed this visit You Were Diagnosed With   
  
 Codes Comments Well woman exam    -  Primary ICD-10-CM: M82.112 ICD-9-CM: V72.31 Encounter for immunization     ICD-10-CM: L28 ICD-9-CM: V03.89 Vitals BP Pulse Temp Resp Height(growth percentile) Weight(growth percentile) 99/63 65 98.3 °F (36.8 °C) (Oral) 16 5' 4\" (1.626 m) 162 lb (73.5 kg) BMI OB Status Smoking Status 27.81 kg/m2 Recent pregnancy Never Smoker Vitals History BMI and BSA Data Body Mass Index Body Surface Area  
 27.81 kg/m 2 1.82 m 2 Preferred Pharmacy Pharmacy Name Phone [facility] [address] AT [address] [place] [address] [phone] Your Updated Medication List  
  
   
This list is accurate as of 10/16/18  3:19 PM.  Always use your most recent med list.  
  
  
  
  
 ibuprofen 600 mg tablet Commonly known as:  MOTRIN Take 1 Tab by mouth every six (6) hours as needed for Pain. Indications: Pain  
  
 norethindrone 0.35 mg Tab Commonly known as:  Magdaleno & Magdaleno  
 Take 1 Tab by mouth daily. Prescriptions Sent to Pharmacy Refills  
 norethindrone (MICRONOR) 0.35 mg tab 12 Sig: Take 1 Tab by mouth daily. Class: Normal  
 Pharmacy: [facility] [address], [address] [facility] [address]  [facility] ([facility] #: [phone] Route: Oral  
  
We Performed the Following AMB POC URINE PREGNANCY TEST, VISUAL COLOR COMPARISON [59134 CPT(R)] CBC W/O DIFF [09019 CPT(R)] INFLUENZA VIRUS VAC QUAD,SPLIT,PRESV FREE SYRINGE IM [id number] CPT(R)] METABOLIC PANEL, COMPREHENSIVE [59144 CPT(R)] TN IMMUNIZ ADMIN,1 SINGLE/COMB VAC/TOXOID [id number] CPT(R)] Follow-up Instructions Return if symptoms worsen or fail to improve. Patient Instructions Well Visit, Ages 25 to 48: Care Instructions Your Care Instructions Physical exams can help you stay healthy. Your doctor has checked your overall health and may have suggested ways to take good care of yourself. He or she also may have recommended tests. At home, you can help prevent illness with healthy eating, regular exercise, and other steps. Follow-up care is a key part of your treatment and safety. Be sure to make and go to all appointments, and call your doctor if you are having problems. It's also a good idea to know your test results and keep a list of the medicines you take. How can you care for yourself at home? · Reach and stay at a healthy weight. This will lower your risk for many problems, such as obesity, diabetes, heart disease, and high blood pressure. · Get at least 30 minutes of physical activity on most days of the week. Walking is a good choice. You also may want to do other activities, such as running, swimming, cycling, or playing tennis or team sports. Discuss any changes in your exercise program with your doctor. · Do not smoke or allow others to smoke around you.  If you need help quitting, talk to your doctor about stop-smoking programs and medicines. These can increase your chances of quitting for good. · Talk to your doctor about whether you have any risk factors for sexually transmitted infections (STIs). Having one sex partner (who does not have STIs and does not have sex with anyone else) is a good way to avoid these infections. · Use birth control if you do not want to have children at this time. Talk with your doctor about the choices available and what might be best for you. · Protect your skin from too much sun. When you're outdoors from 10 a.m. to 4 p.m., stay in the shade or cover up with clothing and a hat with a wide brim. Wear sunglasses that block UV rays. Even when it's cloudy, put broad-spectrum sunscreen (SPF 30 or higher) on any exposed skin. · See a dentist one or two times a year for checkups and to have your teeth cleaned. · Wear a seat belt in the car. · Drink alcohol in moderation, if at all. That means no more than 2 drinks a day for men and 1 drink a day for women. Follow your doctor's advice about when to have certain tests. These tests can spot problems early. For everyone · Cholesterol. Have the fat (cholesterol) in your blood tested after age 21. Your doctor will tell you how often to have this done based on your age, family history, or other things that can increase your risk for heart disease. · Blood pressure. Have your blood pressure checked during a routine doctor visit. Your doctor will tell you how often to check your blood pressure based on your age, your blood pressure results, and other factors. · Vision. Talk with your doctor about how often to have a glaucoma test. 
· Diabetes. Ask your doctor whether you should have tests for diabetes. · Colon cancer. Have a test for colon cancer at age 48. You may have one of several tests.  If you are younger than 48, you may need a test earlier if you have any risk factors. Risk factors include whether you already had a precancerous polyp removed from your colon or whether your parent, brother, sister, or child has had colon cancer. For women · Breast exam and mammogram. Talk to your doctor about when you should have a clinical breast exam and a mammogram. Medical experts differ on whether and how often women under 50 should have these tests. Your doctor can help you decide what is right for you. · Pap test and pelvic exam. Begin Pap tests at age 24. A Pap test is the best way to find cervical cancer. The test often is part of a pelvic exam. Ask how often to have this test. 
· Tests for sexually transmitted infections (STIs). Ask whether you should have tests for STIs. You may be at risk if you have sex with more than one person, especially if your partners do not wear condoms. For men · Tests for sexually transmitted infections (STIs). Ask whether you should have tests for STIs. You may be at risk if you have sex with more than one person, especially if you do not wear a condom. · Testicular cancer exam. Ask your doctor whether you should check your testicles regularly. · Prostate exam. Talk to your doctor about whether you should have a blood test (called a PSA test) for prostate cancer. Experts differ on whether and when men should have this test. Some experts suggest it if you are older than 39 and are -American or have a father or brother who got prostate cancer when he was younger than 72. When should you call for help? Watch closely for changes in your health, and be sure to contact your doctor if you have any problems or symptoms that concern you. Where can you learn more? Go to http://christiana-divine.info/. Enter P072 in the search box to learn more about \"Well Visit, Ages 25 to 48: Care Instructions. \" Current as of: March 29, 2018 Content Version: 11.8 © 0061-0495 [employer]. Care instructions adapted under license by [employer] (which disclaims liability or warranty for this information). If you have questions about a medical condition or this instruction, always ask your healthcare professional. [address] any warranty or liability for your use of this information. Introducing [facility] [place] [facility]! [facility] introduces [employer] patient portal. Now you can access parts of your medical record, email your doctor's office, and request medication refills online. 1. In your internet browser, go to [email or web address] 2. Click on the First Time User? Click Here link in the Sign In box. You will see the New Member Sign Up page. 3. Enter your [employer] Access Code exactly as it appears below. You will not need to use this code after youve completed the sign-up process. If you do not sign up before the expiration date, you must request a new code. · [employer] Access Code: [email or web address] Expires: 1/14/2019  3:19 PM 
 
4. Enter the last four digits of your Social Security Number (xxxx) and Date of Birth (mm/dd/yyyy) as indicated and click Submit. You will be taken to the next sign-up page. 5. Create a [employer] ID. This will be your [employer] login ID and cannot be changed, so think of one that is secure and easy to remember. 6. Create a [employer] password. You can change your password at any time. 7. Enter your Password Reset Question and Answer. This can be used at a later time if you forget your password. 8. Enter your e-mail address. You will receive e-mail notification when new information is available in [address] 9. Click Sign Up. You can now view and download portions of your medical record. 10. Click the Download Summary menu link to download a portable copy of your medical information.  
 
If you have questions, please visit the Frequently Asked Questions section of the [employer]. Remember, [employer] is NOT to be used for urgent needs. For medical emergencies, dial 911. Now available from your iPhone and Android! Please provide this summary of care documentation to your next provider. Your primary care clinician is listed as [address] [place] [address]. If you have any questions after today's visit, please call [phone].

## 2018-10-16 NOTE — PATIENT INSTRUCTIONS

## 2018-10-17 LAB
ALBUMIN SERPL-MCNC: 4.8 G/DL (ref 3.5–5.5)
ALBUMIN/GLOB SERPL: 1.9 {RATIO} (ref 1.2–2.2)
ALP SERPL-CCNC: 69 IU/L (ref 39–117)
ALT SERPL-CCNC: 13 IU/L (ref 0–32)
AST SERPL-CCNC: 9 IU/L (ref 0–40)
BILIRUB SERPL-MCNC: 1.2 MG/DL (ref 0–1.2)
BUN SERPL-MCNC: 19 MG/DL (ref 6–20)
BUN/CREAT SERPL: 28 (ref 9–23)
CALCIUM SERPL-MCNC: 10.1 MG/DL (ref 8.7–10.2)
CHLORIDE SERPL-SCNC: 99 MMOL/L (ref 96–106)
CO2 SERPL-SCNC: 23 MMOL/L (ref 20–29)
CREAT SERPL-MCNC: 0.69 MG/DL (ref 0.57–1)
ERYTHROCYTE [DISTWIDTH] IN BLOOD BY AUTOMATED COUNT: 13.8 % (ref 12.3–15.4)
GLOBULIN SER CALC-MCNC: 2.5 G/DL (ref 1.5–4.5)
GLUCOSE SERPL-MCNC: 85 MG/DL (ref 65–99)
HCT VFR BLD AUTO: 39 % (ref 34–46.6)
HGB BLD-MCNC: 13.9 G/DL (ref 11.1–15.9)
MCH RBC QN AUTO: 30.9 PG (ref 26.6–33)
MCHC RBC AUTO-ENTMCNC: 35.6 G/DL (ref 31.5–35.7)
MCV RBC AUTO: 87 FL (ref 79–97)
PLATELET # BLD AUTO: 264 X10E3/UL (ref 150–379)
POTASSIUM SERPL-SCNC: 4.1 MMOL/L (ref 3.5–5.2)
PROT SERPL-MCNC: 7.3 G/DL (ref 6–8.5)
RBC # BLD AUTO: 4.5 X10E6/UL (ref 3.77–5.28)
SODIUM SERPL-SCNC: 137 MMOL/L (ref 134–144)
WBC # BLD AUTO: 7.7 X10E3/UL (ref 3.4–10.8)

## 2019-02-24 ENCOUNTER — HOSPITAL ENCOUNTER (EMERGENCY)
Age: 34
Discharge: HOME | End: 2019-02-24
Attending: EMERGENCY MEDICINE
Payer: MEDICAID

## 2019-02-24 ENCOUNTER — APPOINTMENT (OUTPATIENT)
Dept: GENERAL RADIOLOGY | Age: 34
End: 2019-02-24
Attending: EMERGENCY MEDICINE
Payer: MEDICAID

## 2019-02-24 VITALS
HEIGHT: 64 IN | DIASTOLIC BLOOD PRESSURE: 80 MMHG | WEIGHT: 168 LBS | RESPIRATION RATE: 14 BRPM | HEART RATE: 55 BPM | OXYGEN SATURATION: 100 % | SYSTOLIC BLOOD PRESSURE: 128 MMHG | BODY MASS INDEX: 28.68 KG/M2 | TEMPERATURE: 98.5 F

## 2019-02-24 DIAGNOSIS — M79.641 RIGHT HAND PAIN: Primary | ICD-10-CM

## 2019-02-24 PROCEDURE — 75810000053 HC SPLINT APPLICATION

## 2019-02-24 PROCEDURE — 99282 EMERGENCY DEPT VISIT SF MDM: CPT

## 2019-02-24 PROCEDURE — L3809 WHFO W/O JOINTS PRE OTS: HCPCS

## 2019-02-24 PROCEDURE — 73130 X-RAY EXAM OF HAND: CPT

## 2019-02-24 RX ORDER — IBUPROFEN 600 MG/1
600 TABLET ORAL
Qty: 20 TAB | Refills: 0 | Status: SHIPPED | OUTPATIENT
Start: 2019-02-24 | End: 2019-11-26 | Stop reason: ALTCHOICE

## 2019-02-24 NOTE — ED PROVIDER NOTES
Patient is a 80-year-old female with a past medical history significant for PCL as he was ambulatory to the ED today with atraumatic right hand pain. Patient states she is having pain over the third and fourth knuckles on her right hand. Notes she does a lot he got\" but denies any recent trauma to the hand or previous fracture to the hand. Patient states she's \"known to have her hands on all kinds of items\" around her home. Patient states she has difficulty moving the fingers up and down. She cannot make a full fist. Patient has tried nothing for the pain. Patient has never had pain in her right hand like this before. The patient has no further complaints at this time. Primary care provider:Ramona Rivas MD 
 
 
 
The history is provided by the patient. No  was used. Past Medical History:  
Diagnosis Date  Irregular menses  PCOS (polycystic ovarian syndrome) No past surgical history on file. Family History:  
Problem Relation Age of Onset  Hypertension Mother  Stroke Mother  Other Mother   
     Purdin Ali Syndrome  Asthma Father  Elevated Lipids Father  Other Sister   
     scolosis Social History Socioeconomic History  Marital status:  Spouse name: Not on file  Number of children: Not on file  Years of education: Not on file  Highest education level: Not on file Social Needs  Financial resource strain: Not on file  Food insecurity - worry: Not on file  Food insecurity - inability: Not on file  Transportation needs - medical: Not on file  Transportation needs - non-medical: Not on file Occupational History  Not on file Tobacco Use  Smoking status: Never Smoker  Smokeless tobacco: Never Used Substance and Sexual Activity  Alcohol use: No  
 Drug use: No  
 Sexual activity: Yes  
  Partners: Male Other Topics Concern  Not on file Social History Narrative  Not on file ALLERGIES: Patient has no known allergies. Review of Systems Musculoskeletal: Positive for arthralgias and myalgias. All other systems reviewed and are negative. Vitals:  
 02/24/19 6813 BP: 128/80 Pulse: (!) 55 Resp: 14 Temp: 98.5 °F (36.9 °C) SpO2: 100% Weight: 76.2 kg (168 lb) Height: 5' 4\" (1.626 m) Physical Exam  
Constitutional: She appears well-developed and well-nourished. HENT:  
Head: Atraumatic. Eyes: EOM are normal.  
Neck: No tracheal deviation present. Pulmonary/Chest: Effort normal. No respiratory distress. Musculoskeletal:  
     Right hand: She exhibits decreased range of motion, tenderness and bony tenderness. She exhibits normal two-point discrimination, normal capillary refill, no deformity, no laceration and no swelling. Normal sensation noted. Decreased strength noted. Hands: 
TTP across the knuckles on the right hand, 3rd & 4th fingers. Pain with making fist and PROM of the fingers. Mild erythema. No warmth. Sensation equal both hands. Neurological: She is alert. Skin: Skin is warm and dry. Psychiatric: She has a normal mood and affect. Her behavior is normal. Judgment and thought content normal.  
Nursing note and vitals reviewed. MDM Procedures Assessment & Plan:  
 
Orders Placed This Encounter  XR HAND MIN 3 VIEWS RIGHT  APPLY WRIST IMMOBILIZER Discussed with Nia Bermeo MD,ED Provider Elena Mendenhall NP 
02/24/19 
8:40 AM 
 
Imaging without acute findings. Refused removal splint. Orthoglass splint made. Discussed will need to follow-up with hand surgery. Follow-up with PCP. Discussed return precautions. Splint applied by Fernando Perry and evaluated by Elena Mendenhall NP. Neurovascular status intact post splint application. Desired position maintaineed 9:05 AM 
The patient has been reevaluated. The patient is ready for discharge.  The patient's signs, symptoms, diagnosis, and discharge instructions have been discussed and the patient/ family has conveyed their understanding. The patient is to follow up as recommended or return to the ED should their symptoms worsen. Plan has been discussed and the patient is in agreement. LABORATORY TESTS: 
Labs Reviewed - No data to display IMAGING RESULTS: 
Xr Hand Rt Min 3 V Result Date: 2/24/2019 EXAM: XR HAND RT MIN 3 V INDICATION: Right hand pain at the MCP joints for one day. No injury in the EMR at the time of this interpretation. COMPARISON: None. FINDINGS: Three views of the right hand demonstrate no fracture or other acute osseous or articular abnormality. The soft tissues are within normal limits. Joints are within normal limits. IMPRESSION: Normal right hand views. MEDICATIONS GIVEN: 
Medications - No data to display IMPRESSION: 
1. Right hand pain PLAN: 
1. Current Discharge Medication List  
  
CONTINUE these medications which have CHANGED Details  
ibuprofen (MOTRIN) 600 mg tablet Take 1 Tab by mouth every six (6) hours as needed for Pain. Qty: 20 Tab, Refills: 0  
  
  
 
2. Follow-up Information Follow up With Specialties Details Why Contact Info Sun De La Paz MD Orthopedic Surgery Schedule an appointment as soon as possible for a visit  [address] [facility] [facility] 
[phone] Montana Lynn MD Family Practice Schedule an appointment as soon as possible for a visit As needed [address] [facility] [address] [facility] 
[phone] [facility] EMERGENCY DEPT Emergency Medicine  As needed, If symptoms worsen [address] [facility] [address] 
[phone] 3. Return to ED for new or worsening symptoms Alaina Alcazar NP

## 2019-02-24 NOTE — DISCHARGE INSTRUCTIONS
Thank you for allowing us to care for you today. Please follow-up with your Primary Care provider in the next 2-3 days if your symptoms do not improve. Plan for home:     Keep your hand in a splint. Keep the splint clean and dry. Do not remove the splint. No Fracture or dislocation was seen. Sometimes fractures take a few days to show when they are hairline. If you have continued pain you need to follow-up with [address] [place] [address] for repeat films and follow-up. Tylenol 1000 mg alternating with Ibuprofen 600mg every 6 hours for pain control. Example: 8am take Ibuprofen. 11am take tylenol. 2pm take ibuprofen. 5pm take tylenol. 8pm take ibuprofen. 11pm take tylenol. You may continue this process overnight if you have continued pain/discomfort. Come back to the ER if you have worsening symptoms, fevers over 100.9, shaking chills, nausea or vomiting. Patient Education        Hand Pain: Care Instructions  Your Care Instructions    Common causes of hand pain are overuse and injuries, such as might happen during sports or home repair projects. Everyday wear and tear, especially as you get older, also can cause hand pain. Most minor hand injuries will heal on their own, and home treatment is usually all you need to do. If you have sudden and severe pain, you may need tests and treatment. Follow-up care is a key part of your treatment and safety. Be sure to make and go to all appointments, and call your doctor if you are having problems. It's also a good idea to know your test results and keep a list of the medicines you take. How can you care for yourself at home? · Take pain medicines exactly as directed. ? If the doctor gave you a prescription medicine for pain, take it as prescribed. ? If you are not taking a prescription pain medicine, ask your doctor if you can take an over-the-counter medicine. · Rest and protect your hand. Take a break from any activity that may cause pain.   · Put ice or a cold pack on your hand for 10 to 20 minutes at a time. Put a thin cloth between the ice and your skin. · Prop up the sore hand on a pillow when you ice it or anytime you sit or lie down during the next 3 days. Try to keep it above the level of your heart. This will help reduce swelling. · If your doctor recommends a sling, splint, or elastic bandage to support your hand, wear it as directed. When should you call for help? Call 911 anytime you think you may need emergency care. For example, call if:    · Your hand turns cool or pale or changes color.    Call your doctor now or seek immediate medical care if:    · You cannot move your hand.     · Your hand pops, moves out of its normal position, and then returns to its normal position.     · You have signs of infection, such as:  ? Increased pain, swelling, warmth, or redness. ? Red streaks leading from the sore area. ? Pus draining from a place on your hand. ? A fever.     · Your hand feels numb or tingly.    Watch closely for changes in your health, and be sure to contact your doctor if:    · Your hand feels unstable when you try to use it.     · You do not get better as expected.     · You have any new symptoms, such as swelling.     · Bruises from an injury to your hand last longer than 2 weeks. Where can you learn more? Go to http://christiana-divine.info/. Enter R273 in the search box to learn more about \"Hand Pain: Care Instructions. \"  Current as of: September 23, 2018  Content Version: 11.9  © 2931-4277 [employer], Incorporated. Care instructions adapted under license by [employer] (which disclaims liability or warranty for this information). If you have questions about a medical condition or this instruction, always ask your healthcare professional. [address] any warranty or liability for your use of this information.

## 2019-04-09 ENCOUNTER — OFFICE VISIT (OUTPATIENT)
Dept: FAMILY MEDICINE CLINIC | Age: 34
End: 2019-04-09

## 2019-04-09 VITALS
HEIGHT: 64 IN | TEMPERATURE: 97.9 F | DIASTOLIC BLOOD PRESSURE: 69 MMHG | BODY MASS INDEX: 28.17 KG/M2 | HEART RATE: 65 BPM | RESPIRATION RATE: 16 BRPM | SYSTOLIC BLOOD PRESSURE: 110 MMHG | OXYGEN SATURATION: 99 % | WEIGHT: 165 LBS

## 2019-04-09 DIAGNOSIS — Z83.71 FAMILY HISTORY OF POLYPS IN THE COLON: ICD-10-CM

## 2019-04-09 DIAGNOSIS — J02.9 SORE THROAT: Primary | ICD-10-CM

## 2019-04-09 LAB
S PYO AG THROAT QL: NEGATIVE
VALID INTERNAL CONTROL?: YES

## 2019-04-09 NOTE — PROGRESS NOTES
Subjective:      Jun Cuenca is a 35 y.o. female who presents for sore throat. Has been going on for the past three days. Denies any fevers, chills or shortness of breath. No history of asthma or smoking. Eating and drinking well. Daughter has similar symptoms. Has tried throat lozenges with some relief. Also needs referral to GI. Family history of colon polyps  Brother and mother had polyps at age 27. GM - history of colon cancer       Review of Systems   Constitutional: Negative for chills and fever. HENT: Positive for sore throat. Negative for congestion and sinus pain. Respiratory: Negative for cough and shortness of breath. Cardiovascular: Negative for chest pain and palpitations. Gastrointestinal: Negative for abdominal pain, nausea and vomiting. PMHx:  Past Medical History:   Diagnosis Date    Irregular menses     PCOS (polycystic ovarian syndrome)        Meds:   Current Outpatient Medications   Medication Sig Dispense Refill    norethindrone (MICRONOR) 0.35 mg tab TAKE 1 TABLET BY MOUTH DAILY 28 Tab 6    norethindrone (MICRONOR) 0.35 mg tab Take 1 Tab by mouth daily. 1 Package 12    ibuprofen (MOTRIN) 600 mg tablet Take 1 Tab by mouth every six (6) hours as needed for Pain.  20 Tab 0       Allergies:   No Known Allergies    Smoker:  Social History     Tobacco Use   Smoking Status Never Smoker   Smokeless Tobacco Never Used       ETOH:   Social History     Substance and Sexual Activity   Alcohol Use No       FH:   Family History   Problem Relation Age of Onset    Hypertension Mother     Stroke Mother     Other Mother         Yazan Sauger Syndrome    Asthma Father     Elevated Lipids Father     Other Sister         scolosis         Objective:     Visit Vitals  /69   Pulse 65   Temp 97.9 °F (36.6 °C) (Oral)   Resp 16   Ht 5' 4\" (1.626 m)   Wt 165 lb (74.8 kg)   LMP 03/06/2019   SpO2 99%   BMI 28.32 kg/m²       Physical Exam   Constitutional: She is oriented to person, place, and time and well-developed, well-nourished, and in no distress. No distress. HENT:   Right Ear: External ear normal.   Left Ear: External ear normal.   Mouth/Throat: No oropharyngeal exudate. Neck: Normal range of motion. Neck supple. Cardiovascular: Regular rhythm and normal heart sounds. Pulmonary/Chest: Effort normal and breath sounds normal. No respiratory distress. She has no wheezes. Lymphadenopathy:     She has cervical adenopathy. Neurological: She is alert and oriented to person, place, and time. Skin: She is not diaphoretic. Assessment:       ICD-10-CM ICD-9-CM    1. Sore throat J02.9 462 AMB POC RAPID STREP A   2. Family history of polyps in the colon Z83.71 V18.51 REFERRAL TO GASTROENTEROLOGY       POC strep negative  Plan:      Likely viral in nature  - Advised on symptomatic control with saline rinses and nasal sprays, handout given  - Can use tylenol/motrin as needed for generalized muscle pain and fever  - Can use Sudafed for nasal congestion, Robitussin for cough suppression, and Mucinex for cough expectorant   - Advised on the need to stay well hydrated and that symptoms can last up to 1.5 weeks   - Educated on the lack of benefit of antibiotics in a viral illness                                                           - Referral to GI, Dr Chester Negro for screening colonoscopy       Patient is counseled to return to the office if symptoms do not improve as expected. Urgent consultation with the nearest Emergency Department is strongly recommended if condition worsens. Patient is counseled to follow up as recommended and to inform the office if any changes in treatment are recommended.             Signed By:  Joseph Bauer MD    Family Medicine Resident

## 2019-05-10 RX ORDER — ACETAMINOPHEN AND CODEINE PHOSPHATE 120; 12 MG/5ML; MG/5ML
SOLUTION ORAL
Qty: 28 TAB | Refills: 6 | Status: SHIPPED | OUTPATIENT
Start: 2019-05-10 | End: 2019-11-13 | Stop reason: SDUPTHER

## 2019-05-10 NOTE — TELEPHONE ENCOUNTER
Requested Prescriptions     Pending Prescriptions Disp Refills    norethindrone (MICRONOR) 0.35 mg tab 28 Tab 6

## 2019-05-13 ENCOUNTER — OFFICE VISIT (OUTPATIENT)
Dept: FAMILY MEDICINE CLINIC | Age: 34
End: 2019-05-13

## 2019-05-13 VITALS
TEMPERATURE: 98.2 F | SYSTOLIC BLOOD PRESSURE: 116 MMHG | DIASTOLIC BLOOD PRESSURE: 70 MMHG | RESPIRATION RATE: 18 BRPM | WEIGHT: 167 LBS | HEART RATE: 68 BPM | BODY MASS INDEX: 28.51 KG/M2 | OXYGEN SATURATION: 100 % | HEIGHT: 64 IN

## 2019-05-13 DIAGNOSIS — R30.0 DYSURIA: Primary | ICD-10-CM

## 2019-05-13 DIAGNOSIS — N30.90 CYSTITIS: ICD-10-CM

## 2019-05-13 DIAGNOSIS — N92.6 IRREGULAR MENSES: ICD-10-CM

## 2019-05-13 LAB
BILIRUB UR QL STRIP: NEGATIVE
GLUCOSE UR-MCNC: NEGATIVE MG/DL
HCG URINE, QL. (POC): NEGATIVE
KETONES P FAST UR STRIP-MCNC: NEGATIVE MG/DL
PH UR STRIP: 5.5 [PH] (ref 4.6–8)
PROT UR QL STRIP: NEGATIVE
SP GR UR STRIP: 1 (ref 1–1.03)
UA UROBILINOGEN AMB POC: NORMAL (ref 0.2–1)
URINALYSIS CLARITY POC: NORMAL
URINALYSIS COLOR POC: YELLOW
URINE BLOOD POC: NORMAL
URINE LEUKOCYTES POC: NORMAL
URINE NITRITES POC: NEGATIVE
VALID INTERNAL CONTROL?: YES

## 2019-05-13 RX ORDER — NITROFURANTOIN 25; 75 MG/1; MG/1
100 CAPSULE ORAL 2 TIMES DAILY
Qty: 14 CAP | Refills: 0 | Status: SHIPPED | OUTPATIENT
Start: 2019-05-13 | End: 2019-05-20

## 2019-05-13 NOTE — PROGRESS NOTES
Chief Complaint   Patient presents with    Bladder Infection     dysuria,urinary frequency X 3 days     1. Have you been to the ER, urgent care clinic since your last visit? Hospitalized since your last visit? No    2. Have you seen or consulted any other health care providers outside of the [address] [place] [address] since your last visit? Include any pap smears or colon screening.  No

## 2019-05-13 NOTE — PROGRESS NOTES
[id number] [address] Program,    [address] [place] [address]   Affiliated with [facility] and Zahra Herron Note   Subjective:   Adriane Campos is a 29 y.o. female presents for evaluation of pain with urination  CC:\" I think I have an UTI\"   History provided by patient     HPI:  For the past 2 days , pt complains of symptoms of dysuria,urinary frequency and lower abdominal pain. She states symptoms are similar to previous episode of UTI in the past.   Patient's last menstrual period was 03/13/2019 (approximate). She is currently breast feeding. Denies any fever, chills,nausea, abnormal vaginal discharge. Current Outpatient Medications on File Prior to Visit   Medication Sig Dispense Refill    norethindrone (MICRONOR) 0.35 mg tab TAKE 1 TABLET BY MOUTH DAILY 28 Tab 6    norethindrone (MICRONOR) 0.35 mg tab Take 1 Tab by mouth daily. 1 Package 12    ibuprofen (MOTRIN) 600 mg tablet Take 1 Tab by mouth every six (6) hours as needed for Pain. 20 Tab 0     No current facility-administered medications on file prior to visit.         Past Medical History:   Diagnosis Date    Irregular menses     PCOS (polycystic ovarian syndrome)        Social History     Socioeconomic History    Marital status:      Spouse name: Not on file    Number of children: Not on file    Years of education: Not on file    Highest education level: Not on file   Occupational History    Not on file   Social Needs    Financial resource strain: Not on file    Food insecurity:     Worry: Not on file     Inability: Not on file    Transportation needs:     Medical: Not on file     Non-medical: Not on file   Tobacco Use    Smoking status: Never Smoker    Smokeless tobacco: Never Used   Substance and Sexual Activity    Alcohol use: No    Drug use: No    Sexual activity: Yes     Partners: Male   Lifestyle    Physical activity:     Days per week: Not on file     Minutes per session: Not on file    Stress: Not on file   Relationships    Social connections:     Talks on phone: Not on file     Gets together: Not on file     Attends [employer] service: Not on file     Active member of club or organization: Not on file     Attends meetings of clubs or organizations: Not on file     Relationship status: Not on file    Intimate partner violence:     Fear of current or ex partner: Not on file     Emotionally abused: Not on file     Physically abused: Not on file     Forced sexual activity: Not on file   Other Topics Concern    Not on file   Social History Narrative    Not on file       Review of Systems   Constitutional: Negative for chills and fever. Respiratory: Negative for cough and hemoptysis. Cardiovascular: Negative for chest pain. Gastrointestinal: Positive for abdominal pain. Negative for nausea and vomiting. Genitourinary: Positive for dysuria and frequency. Negative for flank pain. Musculoskeletal: Negative for myalgias. Skin: Negative for itching and rash. Objective:     Visit Vitals  /70   Pulse 68   Temp 98.2 °F (36.8 °C) (Oral)   Resp 18   Ht 5' 4\" (1.626 m)   Wt 167 lb (75.8 kg)   LMP 03/13/2019 (Approximate)   SpO2 100%   BMI 28.67 kg/m²      Physical Exam:  Physical Exam   Constitutional: She is oriented to person, place, and time. She appears well-developed and well-nourished. HENT:   Head: Normocephalic and atraumatic. Eyes: Pupils are equal, round, and reactive to light. Conjunctivae are normal. Right eye exhibits no discharge. Left eye exhibits no discharge. No scleral icterus. Neck: Normal range of motion. Neck supple. Cardiovascular: Normal rate, regular rhythm, normal heart sounds and intact distal pulses. Exam reveals no gallop and no friction rub. No murmur heard. Pulmonary/Chest: Effort normal and breath sounds normal. No respiratory distress. She has no wheezes. She has no rales. She exhibits no tenderness. Abdominal: Soft.  Bowel sounds are normal. She exhibits no distension and no mass. There is tenderness. There is no rebound and no guarding. Mild suprapubic tenderness to deep palpation. No CVA tenderness    Neurological: She is alert and oriented to person, place, and time. Skin: Skin is warm and dry. No rash noted. No erythema. Nursing note and vitals reviewed. UA : 3+ leuks,negative nitrites, 1+blood  UPT: negative    Assessment and orders:       ICD-10-CM ICD-9-CM    1. Dysuria R30.0 788.1 AMB POC URINALYSIS DIP STICK AUTO W/O MICRO      CULTURE, URINE   2. Cystitis N30.90 595.9 CULTURE, URINE      nitrofurantoin, macrocrystal-monohydrate, (MACROBID) 100 mg capsule   3. Irregular menses N92.6 626.4 AMB POC URINE PREGNANCY TEST, VISUAL COLOR COMPARISON     Diagnoses and all orders for this visit:    1. Dysuria  -     AMB POC URINALYSIS DIP STICK AUTO W/O MICRO  -     CULTURE, URINE    2. Cystitis  -     CULTURE, URINE  -     nitrofurantoin, macrocrystal-monohydrate, (MACROBID) 100 mg capsule; Take 1 Cap by mouth two (2) times a day for 7 days. 3. Irregular menses  -     AMB POC URINE PREGNANCY TEST, VISUAL COLOR COMPARISON      Follow-up and Dispositions    · Return in about 2 weeks (around 5/27/2019) for prn . I have reviewed patient medical and social history and medications. I have reviewed pertinent labs results and other data. I have discussed the diagnosis with the patient and the intended plan as seen in the above orders. The patient has received an after-visit summary and questions were answered concerning future plans. I have discussed medication side effects and warnings with the patient as well.     Abdelrahman Peng MD  Resident [facility]  05/16/19

## 2019-05-13 NOTE — PATIENT INSTRUCTIONS
Urinary Tract Infection in Women: Care Instructions  Your Care Instructions    A urinary tract infection, or UTI, is a general term for an infection anywhere between the kidneys and the urethra (where urine comes out). Most UTIs are bladder infections. They often cause pain or burning when you urinate. UTIs are caused by bacteria and can be cured with antibiotics. Be sure to complete your treatment so that the infection goes away. Follow-up care is a key part of your treatment and safety. Be sure to make and go to all appointments, and call your doctor if you are having problems. It's also a good idea to know your test results and keep a list of the medicines you take. How can you care for yourself at home? · Take your antibiotics as directed. Do not stop taking them just because you feel better. You need to take the full course of antibiotics. · Drink extra water and other fluids for the next day or two. This may help wash out the bacteria that are causing the infection. (If you have kidney, heart, or liver disease and have to limit fluids, talk with your doctor before you increase your fluid intake.)  · Avoid drinks that are carbonated or have caffeine. They can irritate the bladder. · Urinate often. Try to empty your bladder each time. · To relieve pain, take a hot bath or lay a heating pad set on low over your lower belly or genital area. Never go to sleep with a heating pad in place. To prevent UTIs  · Drink plenty of water each day. This helps you urinate often, which clears bacteria from your system. (If you have kidney, heart, or liver disease and have to limit fluids, talk with your doctor before you increase your fluid intake.)  · Urinate when you need to. · Urinate right after you have sex. · Change sanitary pads often. · Avoid douches, bubble baths, feminine hygiene sprays, and other feminine hygiene products that have deodorants.   · After going to the bathroom, wipe from front to back.  When should you call for help? Call your doctor now or seek immediate medical care if:    · Symptoms such as fever, chills, nausea, or vomiting get worse or appear for the first time.     · You have new pain in your back just below your rib cage. This is called flank pain.     · There is new blood or pus in your urine.     · You have any problems with your antibiotic medicine.    Watch closely for changes in your health, and be sure to contact your doctor if:    · You are not getting better after taking an antibiotic for 2 days.     · Your symptoms go away but then come back. Where can you learn more? Go to http://christiana-divine.info/. Enter A114 in the search box to learn more about \"Urinary Tract Infection in Women: Care Instructions. \"  Current as of: March 20, 2018  Content Version: 11.9  © 5264-9982 [employer]. Care instructions adapted under license by [employer] (which disclaims liability or warranty for this information). If you have questions about a medical condition or this instruction, always ask your healthcare professional. [address] any warranty or liability for your use of this information.

## 2019-05-17 LAB — BACTERIA UR CULT: ABNORMAL

## 2019-11-13 ENCOUNTER — OFFICE VISIT (OUTPATIENT)
Dept: FAMILY MEDICINE CLINIC | Age: 34
End: 2019-11-13

## 2019-11-13 VITALS
DIASTOLIC BLOOD PRESSURE: 72 MMHG | RESPIRATION RATE: 16 BRPM | HEIGHT: 64 IN | TEMPERATURE: 97.4 F | OXYGEN SATURATION: 100 % | SYSTOLIC BLOOD PRESSURE: 111 MMHG | WEIGHT: 173 LBS | HEART RATE: 62 BPM | BODY MASS INDEX: 29.53 KG/M2

## 2019-11-13 DIAGNOSIS — N92.6 IRREGULAR MENSES: Primary | ICD-10-CM

## 2019-11-13 LAB
HCG URINE, QL. (POC): NEGATIVE
VALID INTERNAL CONTROL?: YES

## 2019-11-13 RX ORDER — NORGESTIMATE AND ETHINYL ESTRADIOL 0.25-0.035
1 KIT ORAL DAILY
Qty: 1 PACKAGE | Refills: 11 | Status: SHIPPED | OUTPATIENT
Start: 2019-11-13 | End: 2019-11-26 | Stop reason: SDUPTHER

## 2019-11-13 NOTE — PATIENT INSTRUCTIONS
PAP + HPV cotesting due 2022     Combination Birth Control Pills: Care Instructions  Your Care Instructions    Combination birth control pills are used to prevent pregnancy. They give you a regular dose of the hormones estrogen and progestin. You take a hormone pill every day to prevent pregnancy. Birth control pills come in packs. The most common type has 3 weeks of hormone pills. Some packs have sugar pills (they do not contain any hormones) for the fourth week. During that fourth no-hormone week, you have your period. After the fourth week (28 days), you start a new pack. Some birth control pills are packaged in different ways. For example, some have hormone pills for the fourth week instead of sugar pills. Taking hormones for the entire month causes you to not have periods or to have fewer periods. Others are packaged so that you have a period every 3 months. Your doctor will tell you what type of pills you have. Follow-up care is a key part of your treatment and safety. Be sure to make and go to all appointments, and call your doctor if you are having problems. It's also a good idea to know your test results and keep a list of the medicines you take. How can you care for yourself at home? How do you take the pill? · Follow your doctor's instructions about when to start taking your pills. Use backup birth control, such as a condom, or don't have intercourse for 7 days after you start your pills. · Take your pills every day, at about the same time of day. To help yourself do this, try to take them when you do something else every day, such as brushing your teeth. What if you forget to take a pill? Always read the label for specific instructions, or call your doctor. Here are some basic guidelines:  · If you miss 1 hormone pill, take it as soon as you remember. Ask your doctor if you may need to use a backup birth control method, such as a condom, or not have intercourse.   · If you miss 2 or more hormone pills, take one as soon as you remember you forgot them. Then read the pill label or call your doctor about instructions on how to take your missed pills. Use a backup method of birth control or don't have intercourse for 7 days. Pregnancy is more likely if you miss more than 1 pill. · If you had intercourse, you can use emergency contraception to help prevent pregnancy. The most effective emergency contraception is the copper IUD (inserted by a doctor). You can also get emergency contraceptive pills without a prescription at most drugstores. What else do you need to know? · The pill can have side effects. ? You may have very light or skipped periods. ? You may have bleeding between periods (spotting). This usually decreases after 3 to 4 months. ? You may have mood changes, less interest in sex, or weight gain. · The pill may reduce acne, heavy bleeding and cramping, and symptoms of premenstrual syndrome. · Check with your doctor before you use any other medicines, including over-the-counter medicines, vitamins, herbal products, and supplements. Birth control hormones may not work as well to prevent pregnancy when combined with other medicines. · The pill doesn't protect against sexually transmitted infection (STIs), such as herpes or HIV/AIDS. If you're not sure whether your sex partner might have an STI, use a condom to protect against disease. When should you call for help? Call your doctor now or seek immediate medical care if:    · You have severe belly pain.     · You have signs of a blood clot, such as:  ? Pain in your calf, back of the knee, thigh, or groin. ?  Redness and swelling in your leg or groin.     · You have blurred vision or other problems seeing.     · You have a severe headache.     · You have severe trouble breathing.    Watch closely for changes in your health, and be sure to contact your doctor if:    · You think you might be pregnant.     · You think you may be depressed.     · You think you may have been exposed to or have a sexually transmitted infection. Where can you learn more? Go to http://christiana-divine.info/. Enter A610 in the search box to learn more about \"Combination Birth Control Pills: Care Instructions. \"  Current as of: May 29, 2019  Content Version: 12.2  © 7173-4330 [employer]. Care instructions adapted under license by [employer] (which disclaims liability or warranty for this information). If you have questions about a medical condition or this instruction, always ask your healthcare professional. [address] any warranty or liability for your use of this information.

## 2019-11-13 NOTE — PROGRESS NOTES
Chief Complaint: irregular menses   Source: self     Subjective  Jennifer Nj is an 29 y.o. female who presents for followup for irregular menses. Irregular Menses  - still breastfeeds at night   - no spotting in between periods  - sometimes has menses for 3 days other times for a week  - bleeding is usually moderate, tampon every 3 hours but not full  - cramping occasionally with periods   - no hx DVT/ PE, no history of migraines, no smoking     Allergies - reviewed:   No Known Allergies      Medications - reviewed:   Current Outpatient Medications   Medication Sig    norgestimate-ethinyl estradiol (ORTHO-CYCLEN, SPRINTEC) 0.25-35 mg-mcg tab Take 1 Tab by mouth daily.  norethindrone (MICRONOR) 0.35 mg tab Take 1 Tab by mouth daily.  ibuprofen (MOTRIN) 600 mg tablet Take 1 Tab by mouth every six (6) hours as needed for Pain. No current facility-administered medications for this visit. Past Medical History - reviewed:  Past Medical History:   Diagnosis Date    Irregular menses     PCOS (polycystic ovarian syndrome)     Pseudogout 2019         Past Surgical History - reviewed:   No past surgical history on file.       Social History - reviewed:  Social History     Socioeconomic History    Marital status:      Spouse name: Not on file    Number of children: Not on file    Years of education: Not on file    Highest education level: Not on file   Occupational History    Not on file   Social Needs    Financial resource strain: Not on file    Food insecurity:     Worry: Not on file     Inability: Not on file    Transportation needs:     Medical: Not on file     Non-medical: Not on file   Tobacco Use    Smoking status: Never Smoker    Smokeless tobacco: Never Used   Substance and Sexual Activity    Alcohol use: No    Drug use: No    Sexual activity: Yes     Partners: Male   Lifestyle    Physical activity:     Days per week: Not on file     Minutes per session: Not on file  Stress: Not on file   Relationships    Social connections:     Talks on phone: Not on file     Gets together: Not on file     Attends Congregational service: Not on file     Active member of club or organization: Not on file     Attends meetings of clubs or organizations: Not on file     Relationship status: Not on file    Intimate partner violence:     Fear of current or ex partner: Not on file     Emotionally abused: Not on file     Physically abused: Not on file     Forced sexual activity: Not on file   Other Topics Concern    Not on file   Social History Narrative    Not on file         Family History - reviewed:  Family History   Problem Relation Age of Onset    Hypertension Mother     Stroke Mother     Other Mother         Sturge Radford Syndrome    Asthma Father     Elevated Lipids Father     Other Sister         scolosis         Immunizations - reviewed:   Immunization History   Administered Date(s) Administered    Influenza Vaccine (Quad) PF 09/05/2017, 10/16/2018    Tdap 07/11/2017     Flu: declines today      Review of Systems   Constitutional: Negative for malaise/fatigue and weight loss. Cardiovascular: Negative for chest pain and palpitations. Psychiatric/Behavioral: Negative for depression. The patient is not nervous/anxious. Physical Exam  Visit Vitals  /72   Pulse 62   Temp 97.4 °F (36.3 °C) (Oral)   Resp 16   Ht 5' 4\" (1.626 m)   Wt 173 lb (78.5 kg)   LMP 09/19/2019 (Approximate)   SpO2 100%   BMI 29.70 kg/m²       Physical Exam   Constitutional: She is well-developed, well-nourished, and in no distress. No distress. Cardiovascular: Normal rate, regular rhythm and normal heart sounds. Pulmonary/Chest: Effort normal and breath sounds normal. No respiratory distress. Skin: She is not diaphoretic. Assessment/Plan    ICD-10-CM ICD-9-CM    1.  Irregular menses N92.6 626.4 norgestimate-ethinyl estradiol (ORTHO-CYCLEN, SPRINTEC) 0.25-35 mg-mcg tab       Elissa L Ayana Quintana is an 29 y.o. female with hx of irregular menses here for followup and adjustment of OCPs to manage menses. 1. Irregular menses  - norgestimate-ethinyl estradiol (Jh Castor) 0.25-35 mg-mcg tab; Take 1 Tab by mouth daily. Dispense: 1 Package; Refill: 11      Follow-up and Dispositions    · Return flu shot . I have discussed the diagnosis with the patient and the intended plan as seen in the above orders. Patient verbalized understanding of the plan and agrees with the plan. The patient has received an after-visit summary and questions were answered concerning future plans. I have discussed medication side effects and warnings with the patient as well. Informed patient to return to the office if new symptoms arise. Patient discussed with Dr. Carmen Funez, supervising physician.     Jyothi Díaz PGY2  Family Medicine Resident

## 2019-11-13 NOTE — PROGRESS NOTES
Chief Complaint   Patient presents with   [facility]    Medication Refill     1. Have you been to the ER, urgent care clinic since your last visit? Hospitalized since your last visit? Yes. [facility]. 2. Have you seen or consulted any other health care providers outside of the [address] [place] [address] since your last visit? Include any pap smears or colon screening.  No

## 2019-11-15 NOTE — PROGRESS NOTES
[id number] Medicine Residency Attending Addendum:  Patient encounter was discussed on the day of the encounter with Katarina Braga MD, performing the key elements of the service. I discussed the findings, assessment and plan with the resident and agree with the resident's findings and plan as documented in the resident's note.       Maame Nguyen MD, CAQSM, RMSK

## 2019-11-26 ENCOUNTER — HOSPITAL ENCOUNTER (OUTPATIENT)
Dept: LAB | Age: 34
Discharge: HOME | End: 2019-11-26

## 2019-11-26 ENCOUNTER — OFFICE VISIT (OUTPATIENT)
Dept: FAMILY MEDICINE CLINIC | Age: 34
End: 2019-11-26

## 2019-11-26 VITALS
OXYGEN SATURATION: 99 % | HEART RATE: 64 BPM | DIASTOLIC BLOOD PRESSURE: 75 MMHG | RESPIRATION RATE: 16 BRPM | WEIGHT: 171 LBS | HEIGHT: 64 IN | SYSTOLIC BLOOD PRESSURE: 112 MMHG | BODY MASS INDEX: 29.19 KG/M2 | TEMPERATURE: 98.5 F

## 2019-11-26 DIAGNOSIS — Z23 ENCOUNTER FOR IMMUNIZATION: Primary | ICD-10-CM

## 2019-11-26 DIAGNOSIS — Z00.00 HEALTH MAINTENANCE EXAMINATION: ICD-10-CM

## 2019-11-26 DIAGNOSIS — E66.3 OVERWEIGHT (BMI 25.0-29.9): ICD-10-CM

## 2019-11-26 NOTE — PROGRESS NOTES
Chief Complaint:  Source:    Subjective  Raina Lebron is an 29 y.o. female who presents for complete physical exam.    Still irregular periods   LMP: 11/21/19  Previous period 9/16/19  Bleeding is heavy this time and lasting one week, at times only 3 days of light bleeding     [id number]    Colonoscopy with Dr Mirella Guerra in December for early family history of colon cancer     Allergies - reviewed:   No Known Allergies      Medications - reviewed:   Current Outpatient Medications   Medication Sig    norethindrone (MICRONOR) 0.35 mg tab Take 1 Tab by mouth daily. No current facility-administered medications for this visit. Past Medical History - reviewed:  Past Medical History:   Diagnosis Date    Irregular menses     PCOS (polycystic ovarian syndrome)     Pseudogout 2019         Past Surgical History - reviewed:   History reviewed. No pertinent surgical history.       Social History - reviewed:  Social History     Socioeconomic History    Marital status:      Spouse name: Not on file    Number of children: Not on file    Years of education: Not on file    Highest education level: Not on file   Occupational History    Not on file   Social Needs    Financial resource strain: Not on file    Food insecurity:     Worry: Not on file     Inability: Not on file    Transportation needs:     Medical: Not on file     Non-medical: Not on file   Tobacco Use    Smoking status: Never Smoker    Smokeless tobacco: Never Used   Substance and Sexual Activity    Alcohol use: No    Drug use: No    Sexual activity: Yes     Partners: Male   Lifestyle    Physical activity:     Days per week: Not on file     Minutes per session: Not on file    Stress: Not on file   Relationships    Social connections:     Talks on phone: Not on file     Gets together: Not on file     Attends Latter day service: Not on file     Active member of club or organization: Not on file     Attends meetings of clubs or organizations: Not on file     Relationship status: Not on file    Intimate partner violence:     Fear of current or ex partner: Not on file     Emotionally abused: Not on file     Physically abused: Not on file     Forced sexual activity: Not on file   Other Topics Concern    Not on file   Social History Narrative    Not on file         Family History - reviewed:  Family History   Problem Relation Age of Onset    Hypertension Mother     Stroke Mother     Other Mother         Sturge Rdaford Syndrome    Colon Cancer Mother     Asthma Father     Elevated Lipids Father     Colon Cancer Father     Other Sister         scolosis         Immunizations - reviewed:   Immunization History   Administered Date(s) Administered    Influenza Vaccine (Quad) PF 09/05/2017, 10/16/2018, 11/26/2019    Tdap 07/11/2017         Review of Systems   Constitutional: Negative for malaise/fatigue and weight loss. Cardiovascular: Negative for chest pain and palpitations. Gastrointestinal: Negative for abdominal pain, constipation and diarrhea. Genitourinary:        Irregular periods   Psychiatric/Behavioral: Negative for depression. The patient is not nervous/anxious. Physical Exam  Visit Vitals  /75 (BP 1 Location: Right arm, BP Patient Position: Sitting)   Pulse 64   Temp 98.5 °F (36.9 °C) (Oral)   Resp 16   Ht 5' 4\" (1.626 m)   Wt 171 lb (77.6 kg)   LMP 11/21/2019   SpO2 99%   BMI 29.35 kg/m²       Physical Exam  Constitutional:       General: She is not in acute distress. Appearance: Normal appearance. She is normal weight. She is not toxic-appearing. HENT:      Head: Normocephalic and atraumatic. Right Ear: Tympanic membrane, ear canal and external ear normal. There is no impacted cerumen. Left Ear: Tympanic membrane, ear canal and external ear normal. There is no impacted cerumen. Nose: Nose normal.      Mouth/Throat:      Mouth: Mucous membranes are moist.      Pharynx: Oropharynx is clear.    Eyes: Pupils: Pupils are equal, round, and reactive to light. Cardiovascular:      Rate and Rhythm: Normal rate and regular rhythm. Pulses: Normal pulses. Heart sounds: Normal heart sounds. No murmur. Pulmonary:      Effort: Pulmonary effort is normal. No respiratory distress. Breath sounds: Normal breath sounds. No stridor. No wheezing, rhonchi or rales. Chest:      Chest wall: No tenderness. Abdominal:      General: Abdomen is flat. Palpations: Abdomen is soft. Skin:     General: Skin is warm. Coloration: Skin is not pale. Findings: No erythema. Neurological:      General: No focal deficit present. Mental Status: She is alert and oriented to person, place, and time. Psychiatric:         Mood and Affect: Mood normal.         Behavior: Behavior normal.           Assessment/Plan    ICD-10-CM ICD-9-CM    1. Encounter for immunization Z23 V03.89 INFLUENZA VIRUS VAC QUAD,SPLIT,PRESV FREE SYRINGE IM      [place] IMMUNIZ ADMIN,1 SINGLE/COMB VAC/TOXOID   2. Health maintenance examination Z00.00 V70.0 CBC W/O DIFF      METABOLIC PANEL, COMPREHENSIVE      HEMOGLOBIN A1C WITH EAG      LIPID PANEL   3. Overweight (BMI 25.0-29. 9) E66.3 278.02 CBC W/O DIFF      METABOLIC PANEL, COMPREHENSIVE      HEMOGLOBIN A1C WITH EAG      LIPID PANEL       Jennifer Strong is an 29 y.o. female with hx of PCOS, irregular menses and overweight presenting for complete physical exam. Never had lipid screening nor DM screening and given hx of obesity will screen today. For hx of irregualr and heavy menses CBC and CMP. 1. Encounter for immunization  - INFLUENZA VIRUS VAC QUAD,SPLIT,PRESV FREE SYRINGE IM  - [place] IMMUNIZ ADMIN,1 SINGLE/COMB VAC/TOXOID    2. Health maintenance examination  - CBC W/O DIFF; Future  - METABOLIC PANEL, COMPREHENSIVE; Future  - HEMOGLOBIN A1C WITH EAG; Future  - LIPID PANEL; Future    3. Overweight (BMI 25.0-29.9)  - CBC W/O DIFF;  Future  - METABOLIC PANEL, COMPREHENSIVE; Future  - HEMOGLOBIN A1C WITH EAG; Future  - LIPID PANEL; Future  - referred to [address] [place] [address]        Follow-up and Dispositions    · Return in about 1 year (around 11/26/2020) for annual wellness exam; PAP + HPV due 2022. I have discussed the diagnosis with the patient and the intended plan as seen in the above orders. Patient verbalized understanding of the plan and agrees with the plan. The patient has received an after-visit summary and questions were answered concerning future plans. I have discussed medication side effects and warnings with the patient as well. Informed patient to return to the office if new symptoms arise. Patient discussed with Dr. Mary Phillips MD supervising physician.     Mau Fair PGY2  Family Medicine Resident

## 2019-11-26 NOTE — PATIENT INSTRUCTIONS
Well Visit, Ages 25 to 48: Care Instructions Your Care Instructions Physical exams can help you stay healthy. Your doctor has checked your overall health and may have suggested ways to take good care of yourself. He or she also may have recommended tests. At home, you can help prevent illness with healthy eating, regular exercise, and other steps. Follow-up care is a key part of your treatment and safety. Be sure to make and go to all appointments, and call your doctor if you are having problems. It's also a good idea to know your test results and keep a list of the medicines you take. How can you care for yourself at home? · Reach and stay at a healthy weight. This will lower your risk for many problems, such as obesity, diabetes, heart disease, and high blood pressure. · Get at least 30 minutes of physical activity on most days of the week. Walking is a good choice. You also may want to do other activities, such as running, swimming, cycling, or playing tennis or team sports. Discuss any changes in your exercise program with your doctor. · Do not smoke or allow others to smoke around you. If you need help quitting, talk to your doctor about stop-smoking programs and medicines. These can increase your chances of quitting for good. · Talk to your doctor about whether you have any risk factors for sexually transmitted infections (STIs). Having one sex partner (who does not have STIs and does not have sex with anyone else) is a good way to avoid these infections. · Use birth control if you do not want to have children at this time. Talk with your doctor about the choices available and what might be best for you. · Protect your skin from too much sun. When you're outdoors from 10 a.m. to 4 p.m., stay in the shade or cover up with clothing and a hat with a wide brim. Wear sunglasses that block UV rays. Even when it's cloudy, put broad-spectrum sunscreen (SPF 30 or higher) on any exposed skin. · See a dentist one or two times a year for checkups and to have your teeth cleaned. · Wear a seat belt in the car. Follow your doctor's advice about when to have certain tests. These tests can spot problems early. For everyone · Cholesterol. Have the fat (cholesterol) in your blood tested after age 21. Your doctor will tell you how often to have this done based on your age, family history, or other things that can increase your risk for heart disease. · Blood pressure. Have your blood pressure checked during a routine doctor visit. Your doctor will tell you how often to check your blood pressure based on your age, your blood pressure results, and other factors. · Vision. Talk with your doctor about how often to have a glaucoma test. 
· Diabetes. Ask your doctor whether you should have tests for diabetes. · Colon cancer. Your risk for colorectal cancer gets higher as you get older. Some experts say that adults should start regular screening at age 48 and stop at age 76. Others say to start before age 48 or continue after age 76. Talk with your doctor about your risk and when to start and stop screening. For women · Breast exam and mammogram. Talk to your doctor about when you should have a clinical breast exam and a mammogram. Medical experts differ on whether and how often women under 50 should have these tests. Your doctor can help you decide what is right for you. · Cervical cancer screening test and pelvic exam. Begin with a Pap test at age 24. The test often is part of a pelvic exam. Starting at age 27, you may choose to have a Pap test, an HPV test, or both tests at the same time (called co-testing). Talk with your doctor about how often to have testing. · Tests for sexually transmitted infections (STIs). Ask whether you should have tests for STIs. You may be at risk if you have sex with more than one person, especially if your partners do not wear condoms. For men · Tests for sexually transmitted infections (STIs). Ask whether you should have tests for STIs. You may be at risk if you have sex with more than one person, especially if you do not wear a condom. · Testicular cancer exam. Ask your doctor whether you should check your testicles regularly. · Prostate exam. Talk to your doctor about whether you should have a blood test (called a PSA test) for prostate cancer. Experts differ on whether and when men should have this test. Some experts suggest it if you are older than 39 and are -American or have a father or brother who got prostate cancer when he was younger than 72. When should you call for help? Watch closely for changes in your health, and be sure to contact your doctor if you have any problems or symptoms that concern you. Where can you learn more? Go to http://christiana-divine.info/. Enter P072 in the search box to learn more about \"Well Visit, Ages 25 to 48: Care Instructions. \" Current as of: December 13, 2018 Content Version: 12.2 © 2913-7562 [employer]. Care instructions adapted under license by [employer] (which disclaims liability or warranty for this information). If you have questions about a medical condition or this instruction, always ask your healthcare professional. [address] any warranty or liability for your use of this information. Learning About Dietary Guidelines What are the Dietary Guidelines for Americans? Dietary Guidelines for Americans provide tips for eating well and staying healthy. This helps reduce the risk for long-term (chronic) diseases. These adult guidelines from the [place] recommend that you: 
· Eat lots of fruits, vegetables, whole grains, and low-fat or nonfat dairy products. · Try to balance your eating with your activity. This helps you stay at a healthy weight. · Drink alcohol in moderation, if at all. · Limit foods high in salt, saturated fat, trans fat, and added sugar. What is MyPlate? MyPlate is the U.S. government's food guide. It can help you make your own well-balanced eating plan. A balanced eating plan means that you eat enough, but not too much, and that your food gives you the nutrients you need to stay healthy. MyPlate focuses on eating plenty of whole grains, fruits, and vegetables, and on limiting fat and sugar. It is available online at www. ChooseMyPlate.gov. How can you get started? MyPlate suggests that most adults eat certain amounts from the different food groups: 
Grains Eat 5 to 8 ounces of grains each day. Half of those should be whole grains. Choose whole-grain breads, cold and cooked cereals and grains, pasta (without creamy sauces), hard rolls, or low-fat or fat-free crackers. Vegetables Eat 2 to 3 cups of vegetables every day. They contain little if any fat. And they have lots of nutrients that help protect against heart disease. Fruits Eat 1½ to 2 cups of fruits every day. Fruits contain very little fat but lots of nutrients. Protein foods Most adults need 5 to 6½ ounces each day. Choose fish and lean poultry more often. Eat red meat and fried meats less often. Dried beans, tofu, and nuts are also good sources of protein. Dairy Most adults need 3 cups of milk and milk products a day. Choose low-fat or fat-free products from this food group. If you have problems digesting milk, try eating cheese or yogurt instead. Limit fats and oils, including those used in cooking. When you do use fats, choose oils that are liquid at room temperature (unsaturated fats). These include canola oil and olive oil. Avoid foods with trans fats, such as many fried foods, cookies, and snack foods. Where can you learn more? Go to http://christiana-divine.info/. Enter Q233 in the search box to learn more about \"Learning About Dietary Guidelines. \" Current as of: November 7, 2018 Content Version: 12.2 © 0795-8722 [employer]. Care instructions adapted under license by [employer] (which disclaims liability or warranty for this information). If you have questions about a medical condition or this instruction, always ask your healthcare professional. [address] any warranty or liability for your use of this information.

## 2019-11-26 NOTE — PROGRESS NOTES
Identified Patient with two Patient identifiers (Name and ). Two Patient Identifiers confirmed. Reviewed record in preparation for visit and have obtained necessary documentation. Chief Complaint   Patient presents with    Complete Physical       Visit Vitals  /75 (BP 1 Location: Right arm, BP Patient Position: Sitting)   Pulse 64   Temp 98.5 °F (36.9 °C) (Oral)   Resp 16   Ht 5' 4\" (1.626 m)   Wt 171 lb (77.6 kg)   SpO2 99%   BMI 29.35 kg/m²       1. Have you been to the ER, urgent care clinic since your last visit? Hospitalized since your last visit? No    2. Have you seen or consulted any other health care providers outside of the [address] [place] [address] since your last visit? Include any pap smears or colon screening.  No

## 2019-11-27 LAB
ALBUMIN SERPL-MCNC: 4.3 G/DL (ref 3.5–5)
ALBUMIN/GLOB SERPL: 1.4 {RATIO} (ref 1.1–2.2)
ALP SERPL-CCNC: 47 U/L (ref 45–117)
ALT SERPL-CCNC: 17 U/L (ref 12–78)
ANION GAP SERPL CALC-SCNC: 6 MMOL/L (ref 5–15)
AST SERPL-CCNC: 4 U/L (ref 15–37)
BILIRUB SERPL-MCNC: 0.7 MG/DL (ref 0.2–1)
BUN SERPL-MCNC: 13 MG/DL (ref 6–20)
BUN/CREAT SERPL: 20 (ref 12–20)
CALCIUM SERPL-MCNC: 9.4 MG/DL (ref 8.5–10.1)
CHLORIDE SERPL-SCNC: 106 MMOL/L (ref 97–108)
CHOLEST SERPL-MCNC: 190 MG/DL
CO2 SERPL-SCNC: 29 MMOL/L (ref 21–32)
CREAT SERPL-MCNC: 0.64 MG/DL (ref 0.55–1.02)
ERYTHROCYTE [DISTWIDTH] IN BLOOD BY AUTOMATED COUNT: 12.7 % (ref 11.5–14.5)
EST. AVERAGE GLUCOSE BLD GHB EST-MCNC: 94 MG/DL
GLOBULIN SER CALC-MCNC: 3 G/DL (ref 2–4)
GLUCOSE SERPL-MCNC: 60 MG/DL (ref 65–100)
HBA1C MFR BLD: 4.9 % (ref 4–5.6)
HCT VFR BLD AUTO: 41.9 % (ref 35–47)
HDLC SERPL-MCNC: 57 MG/DL
HDLC SERPL: 3.3 {RATIO} (ref 0–5)
HGB BLD-MCNC: 13.6 G/DL (ref 11.5–16)
LDLC SERPL CALC-MCNC: 115.4 MG/DL (ref 0–100)
LIPID PROFILE,FLP: ABNORMAL
MCH RBC QN AUTO: 30.9 PG (ref 26–34)
MCHC RBC AUTO-ENTMCNC: 32.5 G/DL (ref 30–36.5)
MCV RBC AUTO: 95.2 FL (ref 80–99)
NRBC # BLD: 0 K/UL (ref 0–0.01)
NRBC BLD-RTO: 0 PER 100 WBC
PLATELET # BLD AUTO: 249 K/UL (ref 150–400)
PMV BLD AUTO: 11.5 FL (ref 8.9–12.9)
POTASSIUM SERPL-SCNC: 4.2 MMOL/L (ref 3.5–5.1)
PROT SERPL-MCNC: 7.3 G/DL (ref 6.4–8.2)
RBC # BLD AUTO: 4.4 M/UL (ref 3.8–5.2)
SODIUM SERPL-SCNC: 141 MMOL/L (ref 136–145)
TRIGL SERPL-MCNC: 88 MG/DL (ref ?–150)
VLDLC SERPL CALC-MCNC: 17.6 MG/DL
WBC # BLD AUTO: 6.5 K/UL (ref 3.6–11)

## 2020-01-31 NOTE — PERIOP NOTES
[address] [place] [address] Dr Larsen Preprocedure Instructions      1. On the day of your surgery, please report to registration located on the 2nd floor of the  [facility]. yes    2. You must have a responsible adult to drive you to the hospital, stay at the hospital during your procedure and drive you home. If they leave your procedure will not be started (no exceptions). yes    3. Do not have anything to eat or drink (including water, gum, mints, coffee, and juice) after midnight. This does not apply to the medications you were instructed to take by your physician. yesIf you are currently taking Plavix, Coumadin, Aspirin, or other blood-thinning agents, contact your physician for special instructions. yes,    4. If you are having a procedure that requires bowel prep: We recommend that you have only clear liquids the day before your procedure and begin your bowel prep by 5:00 pm.  You may continue to drink clear liquids until midnight. If for any reason you are not able to complete your prep please notify your physician immediately. yes    5. Have a list of all current medications, including vitamins, herbal supplements and any other over the counter medications. yes    6. If you wear glasses, contacts, dentures and/or hearing aids, they may be removed prior to procedure, please bring a case to store them in. yes    7. You should understand that if you do not follow these instructions your procedure may be cancelled. If your physical condition changes (I.e. fever, cold or flu) please contact your doctor as soon as possible. 8. It is important that you be on time.   If for any reason you are unable to keep your appointment please call [phone] the day of or your physicians office prior to your scheduled procedure

## 2020-02-03 ENCOUNTER — ANESTHESIA EVENT (OUTPATIENT)
Dept: ENDOSCOPY | Age: 35
End: 2020-02-03
Payer: MEDICAID

## 2020-02-03 ENCOUNTER — ANESTHESIA (OUTPATIENT)
Dept: ENDOSCOPY | Age: 35
End: 2020-02-03
Payer: MEDICAID

## 2020-02-03 ENCOUNTER — HOSPITAL ENCOUNTER (OUTPATIENT)
Age: 35
Setting detail: OUTPATIENT SURGERY
Discharge: HOME | End: 2020-02-03
Attending: INTERNAL MEDICINE | Admitting: INTERNAL MEDICINE
Payer: MEDICAID

## 2020-02-03 VITALS
BODY MASS INDEX: 28.04 KG/M2 | DIASTOLIC BLOOD PRESSURE: 63 MMHG | OXYGEN SATURATION: 100 % | HEART RATE: 82 BPM | RESPIRATION RATE: 19 BRPM | HEIGHT: 64 IN | WEIGHT: 164.24 LBS | TEMPERATURE: 97.8 F | SYSTOLIC BLOOD PRESSURE: 107 MMHG

## 2020-02-03 LAB — HCG UR QL: NEGATIVE

## 2020-02-03 PROCEDURE — 88305 TISSUE EXAM BY PATHOLOGIST: CPT

## 2020-02-03 PROCEDURE — 77030013992 HC SNR POLYP ENDOSC BSC -B: Performed by: INTERNAL MEDICINE

## 2020-02-03 PROCEDURE — 74011250636 HC RX REV CODE- 250/636: Performed by: NURSE ANESTHETIST, CERTIFIED REGISTERED

## 2020-02-03 PROCEDURE — 74011250636 HC RX REV CODE- 250/636: Performed by: INTERNAL MEDICINE

## 2020-02-03 PROCEDURE — 74011250637 HC RX REV CODE- 250/637: Performed by: INTERNAL MEDICINE

## 2020-02-03 PROCEDURE — 81025 URINE PREGNANCY TEST: CPT

## 2020-02-03 PROCEDURE — 76060000031 HC ANESTHESIA FIRST 0.5 HR: Performed by: INTERNAL MEDICINE

## 2020-02-03 PROCEDURE — 76040000019: Performed by: INTERNAL MEDICINE

## 2020-02-03 RX ORDER — DEXTROMETHORPHAN/PSEUDOEPHED 2.5-7.5/.8
1.2 DROPS ORAL
Status: DISCONTINUED | OUTPATIENT
Start: 2020-02-03 | End: 2020-02-03 | Stop reason: HOSPADM

## 2020-02-03 RX ORDER — EPINEPHRINE 0.1 MG/ML
1 INJECTION INTRACARDIAC; INTRAVENOUS
Status: DISCONTINUED | OUTPATIENT
Start: 2020-02-03 | End: 2020-02-03 | Stop reason: HOSPADM

## 2020-02-03 RX ORDER — PROPOFOL 10 MG/ML
INJECTION, EMULSION INTRAVENOUS AS NEEDED
Status: DISCONTINUED | OUTPATIENT
Start: 2020-02-03 | End: 2020-02-03 | Stop reason: HOSPADM

## 2020-02-03 RX ORDER — SODIUM CHLORIDE 9 MG/ML
50 INJECTION, SOLUTION INTRAVENOUS CONTINUOUS
Status: DISCONTINUED | OUTPATIENT
Start: 2020-02-03 | End: 2020-02-03 | Stop reason: HOSPADM

## 2020-02-03 RX ORDER — ATROPINE SULFATE 0.1 MG/ML
0.4 INJECTION INTRAVENOUS
Status: DISCONTINUED | OUTPATIENT
Start: 2020-02-03 | End: 2020-02-03 | Stop reason: HOSPADM

## 2020-02-03 RX ORDER — NALOXONE HYDROCHLORIDE 0.4 MG/ML
0.4 INJECTION, SOLUTION INTRAMUSCULAR; INTRAVENOUS; SUBCUTANEOUS
Status: DISCONTINUED | OUTPATIENT
Start: 2020-02-03 | End: 2020-02-03 | Stop reason: HOSPADM

## 2020-02-03 RX ORDER — FLUMAZENIL 0.1 MG/ML
0.2 INJECTION INTRAVENOUS
Status: DISCONTINUED | OUTPATIENT
Start: 2020-02-03 | End: 2020-02-03 | Stop reason: HOSPADM

## 2020-02-03 RX ORDER — SODIUM CHLORIDE 9 MG/ML
INJECTION, SOLUTION INTRAVENOUS
Status: DISCONTINUED | OUTPATIENT
Start: 2020-02-03 | End: 2020-02-03 | Stop reason: HOSPADM

## 2020-02-03 RX ORDER — MIDAZOLAM HYDROCHLORIDE 1 MG/ML
.25-5 INJECTION, SOLUTION INTRAMUSCULAR; INTRAVENOUS
Status: DISCONTINUED | OUTPATIENT
Start: 2020-02-03 | End: 2020-02-03 | Stop reason: HOSPADM

## 2020-02-03 RX ORDER — PROPOFOL 10 MG/ML
INJECTION, EMULSION INTRAVENOUS
Status: DISCONTINUED | OUTPATIENT
Start: 2020-02-03 | End: 2020-02-03 | Stop reason: HOSPADM

## 2020-02-03 RX ADMIN — PROPOFOL 40 MG: 10 INJECTION, EMULSION INTRAVENOUS at 13:48

## 2020-02-03 RX ADMIN — PROPOFOL 125 MCG/KG/MIN: 10 INJECTION, EMULSION INTRAVENOUS at 13:47

## 2020-02-03 RX ADMIN — PROPOFOL 30 MG: 10 INJECTION, EMULSION INTRAVENOUS at 13:53

## 2020-02-03 RX ADMIN — SODIUM CHLORIDE: 9 INJECTION, SOLUTION INTRAVENOUS at 13:30

## 2020-02-03 RX ADMIN — PROPOFOL 100 MG: 10 INJECTION, EMULSION INTRAVENOUS at 13:47

## 2020-02-03 RX ADMIN — PROPOFOL 30 MG: 10 INJECTION, EMULSION INTRAVENOUS at 13:57

## 2020-02-03 RX ADMIN — SODIUM CHLORIDE 50 ML/HR: 900 INJECTION, SOLUTION INTRAVENOUS at 13:00

## 2020-02-03 RX ADMIN — SIMETHICONE 80 MG: 20 SUSPENSION/ DROPS ORAL at 13:54

## 2020-02-03 NOTE — PROGRESS NOTES

## 2020-02-03 NOTE — ANESTHESIA PREPROCEDURE EVALUATION
Relevant Problems   No relevant active problems       Anesthetic History   No history of anesthetic complications            Review of Systems / Medical History  Patient summary reviewed and nursing notes reviewed    Pulmonary  Within defined limits                 Neuro/Psych   Within defined limits           Cardiovascular  Within defined limits                Exercise tolerance: >4 METS     GI/Hepatic/Renal  Within defined limits              Endo/Other  Within defined limits           Other Findings   Comments: Family history of colon cancer           Physical Exam    Airway  Mallampati: I    Neck ROM: normal range of motion   Mouth opening: Normal     Cardiovascular    Rhythm: regular  Rate: normal         Dental  No notable dental hx       Pulmonary  Breath sounds clear to auscultation               Abdominal         Other Findings            Anesthetic Plan    ASA: 1  Anesthesia type: MAC            Anesthetic plan and risks discussed with: Patient      Informed consent obtained.

## 2020-02-03 NOTE — DISCHARGE INSTRUCTIONS
[facility]. [facility] ZACKARY Francois  [phone]            COLON DISCHARGE INSTRUCTIONS       2/3/2020    Fernanda Shah  :  1985  [facility] Medical Record Number:  [id number]      COLONOSCOPY FINDINGS:  Your colonoscopy showed a total of 7 small polyps that were removed and sent to pathology, otherwise mucosa within normal.    DISCOMFORT:  Redness at IV site- apply warm compress to area; if redness or soreness persist- contact your physician  There may be a slight amount of blood passed from the rectum  Gaseous discomfort- walking, belching will help relieve any discomfort  You may not operate a vehicle for 12 hours  You may not engage in an occupation involving machinery or appliances for rest of today  You may not drink alcoholic beverages for at least 12 hours  Avoid making any critical decisions for at least 24 hour  DIET:   High fiber diet. - however -  remember your colon is empty and a heavy meal will produce gas. Avoid these foods:  vegetables, fried / greasy foods, carbonated drinks for today     ACTIVITY:  You may resume your normal daily activities it is recommended that you spend the remainder of the day resting -  avoid any strenuous activity. CALL M.D. ANY SIGN OF:   Increasing pain, nausea, vomiting  Abdominal distension (swelling)  New increased bleeding (oral or rectal)  Fever (chills)  Pain in chest area  Bloody discharge from nose or mouth   Shortness of breath    Follow-up Instructions:   Call Dr. Patrick Barber if any questions or problems. Telephone # [phone]  Biopsy results will be available in  5 to 7 days  Should have a repeat colonoscopy in 1 year.

## 2020-02-03 NOTE — ROUTINE PROCESS
[address] [facility] 1985 
[id number] Situation: 
Verbal report received from: Hope Procedure: Procedure(s): 
COLONOSCOPY 
ENDOSCOPIC POLYPECTOMY Background: 
 
Preoperative diagnosis:  
 
FAMILY HX COLON CANCER Postoperative diagnosis: 1.- Colonic Polyps 2.- :  Dr. Gregory Redd Assistant(s): Endoscopy RN-1: Renato Warren RN Endoscopy RN-2: Niesha Villalba RN Specimens:  
ID Type Source Tests Collected by Time Destination 1 : Transverse Colon Polyps x 4 Preservative   Colt Russell MD 2/3/2020 1358 Pathology 2 : Cecum Polyp Roloative   Colt Russell MD 2/3/2020 1359 Pathology 3 : Ascending Colon Polyp Roloative   Colt Russell MD 2/3/2020 1402 Pathology 4 : Sigmoid Colon Polyp ervative   Colt Russell MD 2/3/2020 1410 Pathology H. Pylori  no Assessment: 
Intra-procedure medications Anesthesia gave intra-procedure sedation and medications, see anesthesia flow sheet yes Intravenous fluids: NS@ [facility] Vital signs stable Abdominal assessment: round and soft Recommendation: 
Discharge patient per MD order. Family or Friend Permission to share finding with family or friend yes

## 2020-02-03 NOTE — H&P
The patient is a 29year old female who presents for a screening colonscopy. The patient presents for a screening colonoscopy evaluation (Referred by Dr. Gertrude Yin). Note for \"Screening Colonoscopy\": Her mother had cancerous polyps taken out and her grandmother had colon cancer and her brother had polyps removed. She denies any GI issues or bleeding. She denies any upper Gi symptoms. Allergies Revveritoa Areli; 12/9/2019 10:16 AM)  No Known Allergies  [12/03/2019]:  No Known Drug Allergies  [12/03/2019]:    Medication History (Revonda Formjose; 12/9/2019 10:16 AM)  Beata Reji (0.25-35MG-MCG Tablet, Oral) Active. Medications Reconciled     Family History Brandon Singleton; 12/9/2019 10:16 AM)  Colon Polyps   Brother, Sister, Father, Mother. Social History Brandon Singleton; 12/9/2019 10:16 AM)  Blood Transfusion   No.  Alcohol Use   Has never drank. Employment status   Full-time. Marital status   . Tobacco Use   Never smoker. Health Maintenance History Brandon Singleton; 12/9/2019 10:16 AM)  Flu Vaccine  [2019]:  Pneumovax   none        Review of Systems Revveritoa Areli; 12/9/2019 10:16 AM)  General Not Present- Chronic Fatigue, Poor Appetite, Weight Gain and Weight Loss. Skin Not Present- Itching, Rash and Skin Color Changes. HEENT Not Present- Hearing Loss and Vertigo. Respiratory Not Present- Difficulty Breathing and TB exposure. Cardiovascular Not Present- Chest Pain, Use of Antibiotics before Dental Procedures and Use of Blood Thinners. Gastrointestinal Present- See HPI. Musculoskeletal Present- Arthritis. Not Present- Hip Replacement Surgery and Knee Replacement Surgery. Neurological Not Present- Weakness. Psychiatric Not Present- Depression. Endocrine Not Present- Diabetes and Thyroid Problems. Hematology Not Present- Anemia.     Vitals Brandon Singleton; 12/9/2019 10:15 AM)  12/9/2019 10:12 AM  Weight: 174 lb   Height: 64 in   Body Surface Area: 1.84 m²   Body Mass Index: 29.87 kg/m²    BP: 130/80 (Sitting, Left Arm, Standard)              Physical Exam (Ethan Barber MD; 12/9/2019 10:44 AM)  General  Mental Status - Alert. General Appearance - Cooperative, Pleasant, Not in acute distress. Orientation - Oriented X3. Build & Nutrition - Well nourished and Well developed. Integumentary  General Characteristics  Overall examination of the patient's skin reveals - no rashes, no bruises and no spider angiomas. Color - normal coloration of skin. Head and Neck  Neck  Global Assessment - full range of motion and supple, no bruit auscultated on the right, no bruit auscultated on the left, non-tender, no lymphadenopathy. Thyroid  Gland Characteristics - normal size and consistency. Eye  Eyeball - Left - No Exophthalmos. Eyeball - Right - No Exophthalmos. Sclera/Conjunctiva - Left - No Jaundice. Sclera/Conjunctiva - Right - No Jaundice. Chest and Lung Exam  Chest and lung exam reveals  - quiet, even and easy respiratory effort with no use of accessory muscles. Auscultation  Breath sounds - Normal. Adventitious sounds - No Adventitious sounds. Cardiovascular  Auscultation  Rhythm - Regular, No Tachycardia, No Bradycardia . Heart Sounds - Normal heart sounds , S1 WNL and S2 WNL, No S3, No Summation Gallop. Murmurs & Other Heart Sounds - Auscultation of the heart reveals - No Murmurs. Abdomen  Inspection  Inspection of the abdomen reveals - Non-distended. Palpation/Percussion  Tenderness - Non-Tender. Rebound tenderness - No rebound. Liver - No hepatosplenomegaly. Abdominal Mass Palpable - No masses. Other Characteristics - No Ascites. Organomegally - None. Auscultation  Auscultation of the abdomen reveals - Bowel sounds normal, No Abdominal bruits and No Succussion splash. Rectal - Did not examine. Peripheral Vascular  Upper Extremity  Inspection - Left - Normal - No Clubbing, No Cyanosis, No Edema, Pulses Intact.  Right - Normal - No Clubbing, No Cyanosis, No Edema, Pulses Intact. Palpation - Edema - Left - No edema. Right - No edema. Lower Extremity  Inspection - Left - Inspection Normal. Right - Inspection Normal. Palpation - Edema - Left - No edema. Right - No edema. Neurologic  Neurologic evaluation reveals  - Cranial nerves grossly intact, no focal neurologic deficits. Motor  Involuntary Movements - Asterixis - not present. Musculoskeletal  Global Assessment  Gait and Station - normal gait and station. Assessment & Plan (Ramy Y. Edith Leventhal MD; 12/9/2019 10:46 AM)  Family history of colonic polyps (V18.51  Z83.71)  Current Plans  Discussed the risks and benefits of colonoscopy with the patient. Family hx of colon cancer (V16.0  Z80.0)  Current Plans  Discussed the risks and benefits of colonoscopy with the patient. COLONOSCOPY, DIAGNOSTIC (64941) (Discussed risks and benefits with the patient to include:; perforation, post polypectomy, or post biopsy bleeding, missed lesions, and sedation reactions.)  Started Suprep Bowel Prep Kit 17.5-3.13-1.6GM/177ML, 180 Milliliter Take as directed before Colonoscopy, 180 Milliliter, 1 day starting 12/09/2019, No Refill. Pt Education - How to access health information online: discussed with patient and provided information. Patient is to call me for any questions or concerns.

## 2020-02-03 NOTE — PROCEDURES
Arabella Wright M.D.  [phone]            2/3/2020          Colonoscopy Operative Report  Nikos Shah  :  1985  [facility] Medical Record Number:  [id number]      Indications:    Family history of coloretal cancer (screening only), Family history of coloretal adenoma  (screening only)     :  Kyle Bishop MD    Referring Provider: Yvette Russ MD    Sedation:  MAC anesthesia    Pre-Procedural Exam:      Airway: clear,  No airway problems anticipated  Heart: RRR, without gallops or rubs  Lungs: clear bilaterally without wheezes, crackles, or rhonchi  Abdomen: soft, nontender, nondistended, bowel sounds present  Mental Status: awake, alert and oriented to person, place and time     Procedure Details:  After informed consent was obtained with all risks and benefits of procedure explained and preoperative exam completed, the patient was taken to the endoscopy suite and placed in the left lateral decubitus position. Upon sequential sedation as per above, a digital rectal exam was performed. The Olympus videocolonoscope  was inserted in the rectum and carefully advanced to the cecum, which was identified by the ileocecal valve and appendiceal orifice. The quality of preparation was good. The colonoscope was slowly withdrawn with careful inspection and evaluation between folds. Retroflexion in the rectum was performed. Findings:   Terminal Ileum: not intubated  Cecum: 1  Sessile polyp(s), the largest 3 mm in size;  Ascending Colon: 1  Sessile polyp(s), the largest 3 mm in size;  Transverse Colon: 4  Pedunculated polyp(s), the largest 5 mm in size; Descending Colon: no mucosal lesion appreciated  Sigmoid: 1  Sessile polyp(s), the largest 4 mm in size; Rectum: no mucosal lesion appreciated  Grade 1 internal hemorrhoid(s);     Interventions:  7 complete polypectomy were performed using hot snare  and the polyps were  retrieved    Specimen Removed:  specimen #1, 3, 4 and 5 mm in size, located in the transverse colon removed by cold and snare cautery and retrieved for pathology  #2, 3 mm in size, located in the cecum removed by snare cautery and retrieved for pathology  #3, 3 mm in size, located in the ascending colon removed by cold snare and retrieved for pathology  #4, 4 mm in size, located in the sigmoid removed by cold snare and retrieved for pathology    Complications: None. EBL:  None. Impression:  A total of 7 polyps were removed and sent to pathology, otherwise mucosa within normal    Recommendations:  -Repeat colonoscopy in 1 year   -Regular diet.    -Resume normal medication(s). Discharge Disposition:  Home in the company of a  when able to ambulate.     Santhosh Torres MD  2/3/2020  2:15 PM

## 2020-02-03 NOTE — ANESTHESIA POSTPROCEDURE EVALUATION
Procedure(s):  COLONOSCOPY  ENDOSCOPIC POLYPECTOMY. MAC    Anesthesia Post Evaluation      Multimodal analgesia: multimodal analgesia not used between 6 hours prior to anesthesia start to PACU discharge  Patient location during evaluation: PACU  Patient participation: complete - patient participated  Level of consciousness: awake and alert  Pain score: 0  Pain management: adequate  Airway patency: patent  Anesthetic complications: no  Cardiovascular status: hemodynamically stable and acceptable  Respiratory status: acceptable  Hydration status: acceptable  Comments: Patient seen and evaluated; no concerns. Post anesthesia nausea and vomiting:  none      Vitals Value Taken Time   /59 2/3/2020  2:22 PM   Temp 36.6 °C (97.8 °F) 2/3/2020  2:22 PM   Pulse 92 2/3/2020  2:26 PM   Resp 17 2/3/2020  2:26 PM   SpO2 100 % 2/3/2020  2:26 PM   Vitals shown include unvalidated device data.

## 2020-10-10 DIAGNOSIS — N92.6 IRREGULAR MENSES: ICD-10-CM

## 2020-10-11 RX ORDER — NORGESTIMATE AND ETHINYL ESTRADIOL 0.25-0.035
KIT ORAL
Qty: 1 DOSE PACK | Refills: 11 | Status: SHIPPED | OUTPATIENT
Start: 2020-10-11 | End: 2020-10-20 | Stop reason: DRUGHIGH

## 2020-10-20 ENCOUNTER — VIRTUAL VISIT (OUTPATIENT)
Dept: FAMILY MEDICINE CLINIC | Age: 35
End: 2020-10-20
Payer: MEDICAID

## 2020-10-20 DIAGNOSIS — Z83.71 FAMILY HISTORY OF POLYPS IN THE COLON: ICD-10-CM

## 2020-10-20 DIAGNOSIS — N92.6 IRREGULAR MENSES: ICD-10-CM

## 2020-10-20 DIAGNOSIS — Z00.00 ANNUAL PHYSICAL EXAM: Primary | ICD-10-CM

## 2020-10-20 PROCEDURE — 99213 OFFICE O/P EST LOW 20 MIN: CPT | Performed by: STUDENT IN AN ORGANIZED HEALTH CARE EDUCATION/TRAINING PROGRAM

## 2020-10-20 RX ORDER — ACETAMINOPHEN AND CODEINE PHOSPHATE 120; 12 MG/5ML; MG/5ML
1 SOLUTION ORAL DAILY
Qty: 1 DOSE PACK | Refills: 11 | Status: SHIPPED | OUTPATIENT
Start: 2020-10-20 | End: 2021-06-30 | Stop reason: SDUPTHER

## 2020-10-20 NOTE — PROGRESS NOTES
Francisco Shah  28 y.o. female  1985  [address] [facility] [address]  [id number]   [address]:    Telemedicine Progress Note  Rock Rodrigez MD       Encounter Date and Time: October 20, 2020 at 2:14 PM    Consent:  She and/or the health care decision maker is aware that that she may receive a bill for this telephone service, depending on her insurance coverage, and has provided verbal consent to proceed: Yes    Chief Complaint   Patient presents with    Physical     History of Present Illness   Dona Joel is a 28 y.o. female was evaluated by synchronous (real-time) audio-video technology from home, through the [address] [facility] Patient Portal.    Annual Physical  1. +FHx of colon cancer- follows Dr. Latisha Simpson. Colonoscopy Feb 2020, 7 polypectomies- repeat in 1yr  2. Irregular menses- OCP- having mood swings, breast tenderness, decreased libido. Used to take micronor and ortho-micronor and tolerated it fine. 3. Pap-NILM and HPV negative in 2017. Repeat in 2022    Diet: 2309 inSelly. 3 meals/day. Weight 170-175   Exercise: yoga every day    Immunizations:  - due for flu shot    SHx: Denies tobacco, alcohol, ilicit drug    Review of Systems   Review of Systems   Constitutional: Negative for chills and fever. HENT: Negative for congestion, sinus pain and sore throat. Respiratory: Negative for cough and shortness of breath. Cardiovascular: Negative for chest pain and palpitations. Gastrointestinal: Negative for abdominal pain, blood in stool, diarrhea, nausea and vomiting. Genitourinary: Negative for dysuria and hematuria. Neurological: Negative for dizziness and headaches. Psychiatric/Behavioral: Negative for depression, substance abuse and suicidal ideas.        Vitals/Objective:     General: alert, cooperative, no distress   Mental  status: mental status: alert, oriented to person, place, and time, normal mood, behavior, speech, dress, motor activity, and thought processes   Resp: resp: normal effort and no respiratory distress   Neuro: neuro: no gross deficits   Skin: skin: no discoloration or lesions of concern on visible areas   Due to this being a TeleHealth evaluation, many elements of the physical examination are unable to be assessed. Assessment and Plan:   Time spent in direct conversation with the patient to include medical condition(s) discussed, assessment and treatment plan:    1. Annual physical exam  - Normal PAP in 2017. Repeat in 2020  - Colonoscopy 2/2 +FHx of polyps+cancer. Follows Dr. Estela Delgado. Next Feb 2021  - Due for influenza- clinic or pharmacy is fine  - Tdap 2017  - Continue diet and exercise    2. Irregular menses  Reports mood swings, breast tenderness, and decreased libido on estrarylla (combined OCP). Tolerated Ortho-micronor in the past. Will switch over. Counseled patient on importance of taking progestin pill same time every day otherwise high likelihood of getting pregnant. If missed dose >3hrs, must use backup method for 48hrs  - norethindrone (Ortho Micronor) 0.35 mg tab; Take 1 Tab by mouth daily. Dispense: 1 Dose Pack; Refill: 11    3. Family history of polyps in the colon  F/u with dr. ALMARAZ    We discussed the expected course, resolution and complications of the diagnosis(es) in detail. Medication risks, benefits, costs, interactions, and alternatives were discussed as indicated. I advised her to contact the office if her condition worsens, changes or fails to improve as anticipated. She expressed understanding with the diagnosis(es) and plan. Patient understands that this encounter was a temporary measure, and the importance of further follow up and examination was emphasized. Patient verbalized understanding.        Patient informed to follow up: 1yr for annual physical.    Electronically Signed: Chance Mckeon MD    Providers location when delivering service (clinic, hospital, home): home    CPT Codes 94107-31679 for Established Patients may apply to this Telehealth Visit. POS code: 18. Modifier GT      Pursuant to the emergency declaration under the [facility] waiver authority and the [employer] Act, this Virtual  Visit was conducted, with patient's consent, to reduce the patient's risk of exposure to COVID-19 and provide continuity of care for an established patient. Services were provided through a video synchronous discussion virtually to substitute for in-person clinic visit. History   Patients past medical, surgical and family histories were reviewed and updated.       Past Medical History:   Diagnosis Date    Irregular menses     Kidney stones     PCOS (polycystic ovarian syndrome)     Pseudogout 2019     Past Surgical History:   Procedure Laterality Date    COLONOSCOPY N/A 2/3/2020    COLONOSCOPY [facility]     Family History   Problem Relation Age of Onset    Hypertension Mother     Stroke Mother     Other Mother         Sturge Radford Syndrome    Colon Cancer Mother     Asthma Father     Elevated Lipids Father     Colon Cancer Father     Other Sister         scolosis    Colon Cancer Paternal Grandmother      Social History     Socioeconomic History    Marital status:      Spouse name: Not on file    Number of children: Not on file    Years of education: Not on file    Highest education level: Not on file   Occupational History    Not on file   Social Needs    Financial resource strain: Not on file    Food insecurity     Worry: Not on file     Inability: Not on file    Transportation needs     Medical: Not on file     Non-medical: Not on file   Tobacco Use    Smoking status: Never Smoker    Smokeless tobacco: Never Used   Substance and Sexual Activity    Alcohol use: No    Drug use: No    Sexual activity: Yes     Partners: Male   Lifestyle    Physical activity Days per week: Not on file     Minutes per session: Not on file    Stress: Not on file   Relationships    Social connections     Talks on phone: Not on file     Gets together: Not on file     Attends [employer] service: Not on file     Active member of club or organization: Not on file     Attends meetings of clubs or organizations: Not on file     Relationship status: Not on file    Intimate partner violence     Fear of current or ex partner: Not on file     Emotionally abused: Not on file     Physically abused: Not on file     Forced sexual activity: Not on file   Other Topics Concern    Not on file   Social History Narrative    Not on file     Patient Active Problem List   Diagnosis Code    Family planning advice Z30.09          Current Medications/Allergies   Medications and Allergies reviewed:    Current Outpatient Medications   Medication Sig Dispense Refill    Estarylla 0.25-35 mg-mcg tab TAKE 1 TABLET BY MOUTH DAILY 1 Dose Pack 11    norethindrone (MICRONOR) 0.35 mg tab Take 1 Tab by mouth daily.  1 Package 12     No Known Allergies

## 2020-12-10 DIAGNOSIS — N92.6 IRREGULAR MENSES: ICD-10-CM

## 2020-12-12 RX ORDER — ACETAMINOPHEN AND CODEINE PHOSPHATE 120; 12 MG/5ML; MG/5ML
1 SOLUTION ORAL DAILY
Qty: 1 DOSE PACK | Refills: 11 | OUTPATIENT
Start: 2020-12-12

## 2021-05-06 ENCOUNTER — TRANSCRIBE ORDER (OUTPATIENT)
Dept: REGISTRATION | Age: 36
End: 2021-05-06

## 2021-05-06 ENCOUNTER — HOSPITAL ENCOUNTER (OUTPATIENT)
Dept: LAB | Age: 36
Discharge: HOME | End: 2021-05-06
Payer: MEDICAID

## 2021-05-06 DIAGNOSIS — Z01.812 PRE-PROCEDURAL LABORATORY EXAMINATIONS: ICD-10-CM

## 2021-05-06 DIAGNOSIS — Z01.812 PRE-PROCEDURAL LABORATORY EXAMINATIONS: Primary | ICD-10-CM

## 2021-05-06 PROCEDURE — U0003 INFECTIOUS AGENT DETECTION BY NUCLEIC ACID (DNA OR RNA); SEVERE ACUTE RESPIRATORY SYNDROME CORONAVIRUS 2 (SARS-COV-2) (CORONAVIRUS DISEASE [COVID-19]), AMPLIFIED PROBE TECHNIQUE, MAKING USE OF HIGH THROUGHPUT TECHNOLOGIES AS DESCRIBED BY CMS-2020-01-R: HCPCS

## 2021-05-07 LAB — SARS-COV-2, COV2NT: NOT DETECTED

## 2021-05-07 NOTE — PROGRESS NOTES
[address]  Endoscopy Preprocedure Instructions      1. On the day of your surgery, please report to registration located on the 2nd floor of the  [facility]. yes    2. You must have a responsible adult to drive you to the hospital, stay at the hospital during your procedure and drive you home. If they leave your procedure will not be started (no exceptions). yes    3. Do not have anything to eat or drink (including water, gum, mints, coffee, and juice) after midnight. This does not apply to the medications you were instructed to take by your physician. yesIf you are currently taking Plavix, Coumadin, Aspirin, or other blood-thinning agents, contact your physician for special instructions. not applicable,    4. If you are having a procedure that requires bowel prep: We recommend that you have only clear liquids the day before your procedure and begin your bowel prep by 5:00 pm.  You may continue to drink clear liquids until midnight. If for any reason you are not able to complete your prep please notify your physician immediately. yes    5. Have a list of all current medications, including vitamins, herbal supplements and any other over the counter medications. yes    6. If you wear glasses, contacts, dentures and/or hearing aids, they may be removed prior to procedure, please bring a case to store them in. yes    7. You should understand that if you do not follow these instructions your procedure may be cancelled. If your physical condition changes (I.e. fever, cold or flu) please contact your doctor as soon as possible. 8. It is important that you be on time.   If for any reason you are unable to keep your appointment please call )- the day of or your physicians office prior to your scheduled procedure

## 2021-05-10 ENCOUNTER — ANESTHESIA (OUTPATIENT)
Dept: ENDOSCOPY | Age: 36
End: 2021-05-10
Payer: MEDICAID

## 2021-05-10 ENCOUNTER — ANESTHESIA EVENT (OUTPATIENT)
Dept: ENDOSCOPY | Age: 36
End: 2021-05-10
Payer: MEDICAID

## 2021-05-10 ENCOUNTER — HOSPITAL ENCOUNTER (OUTPATIENT)
Age: 36
Setting detail: OUTPATIENT SURGERY
Discharge: HOME | End: 2021-05-10
Attending: INTERNAL MEDICINE | Admitting: INTERNAL MEDICINE
Payer: MEDICAID

## 2021-05-10 VITALS
BODY MASS INDEX: 31.64 KG/M2 | OXYGEN SATURATION: 100 % | HEART RATE: 76 BPM | TEMPERATURE: 98 F | DIASTOLIC BLOOD PRESSURE: 71 MMHG | HEIGHT: 63 IN | WEIGHT: 178.57 LBS | SYSTOLIC BLOOD PRESSURE: 99 MMHG | RESPIRATION RATE: 25 BRPM

## 2021-05-10 LAB — HCG UR QL: NEGATIVE

## 2021-05-10 PROCEDURE — 76060000031 HC ANESTHESIA FIRST 0.5 HR: Performed by: INTERNAL MEDICINE

## 2021-05-10 PROCEDURE — 2709999900 HC NON-CHARGEABLE SUPPLY: Performed by: INTERNAL MEDICINE

## 2021-05-10 PROCEDURE — 74011250636 HC RX REV CODE- 250/636: Performed by: INTERNAL MEDICINE

## 2021-05-10 PROCEDURE — 77030013992 HC SNR POLYP ENDOSC BSC -B: Performed by: INTERNAL MEDICINE

## 2021-05-10 PROCEDURE — 88305 TISSUE EXAM BY PATHOLOGIST: CPT

## 2021-05-10 PROCEDURE — 74011250636 HC RX REV CODE- 250/636: Performed by: NURSE ANESTHETIST, CERTIFIED REGISTERED

## 2021-05-10 PROCEDURE — 81025 URINE PREGNANCY TEST: CPT

## 2021-05-10 PROCEDURE — 76040000019: Performed by: INTERNAL MEDICINE

## 2021-05-10 RX ORDER — ATROPINE SULFATE 0.1 MG/ML
0.4 INJECTION INTRAVENOUS
Status: DISCONTINUED | OUTPATIENT
Start: 2021-05-10 | End: 2021-05-10 | Stop reason: HOSPADM

## 2021-05-10 RX ORDER — SODIUM CHLORIDE 9 MG/ML
50 INJECTION, SOLUTION INTRAVENOUS CONTINUOUS
Status: DISCONTINUED | OUTPATIENT
Start: 2021-05-10 | End: 2021-05-10 | Stop reason: HOSPADM

## 2021-05-10 RX ORDER — MIDAZOLAM HYDROCHLORIDE 1 MG/ML
.25-5 INJECTION, SOLUTION INTRAMUSCULAR; INTRAVENOUS
Status: DISCONTINUED | OUTPATIENT
Start: 2021-05-10 | End: 2021-05-10 | Stop reason: HOSPADM

## 2021-05-10 RX ORDER — EPINEPHRINE 0.1 MG/ML
1 INJECTION INTRACARDIAC; INTRAVENOUS
Status: DISCONTINUED | OUTPATIENT
Start: 2021-05-10 | End: 2021-05-10 | Stop reason: HOSPADM

## 2021-05-10 RX ORDER — DEXTROMETHORPHAN/PSEUDOEPHED 2.5-7.5/.8
1.2 DROPS ORAL
Status: DISCONTINUED | OUTPATIENT
Start: 2021-05-10 | End: 2021-05-10 | Stop reason: HOSPADM

## 2021-05-10 RX ORDER — SODIUM CHLORIDE 9 MG/ML
INJECTION, SOLUTION INTRAVENOUS
Status: DISCONTINUED | OUTPATIENT
Start: 2021-05-10 | End: 2021-05-10 | Stop reason: HOSPADM

## 2021-05-10 RX ORDER — FLUMAZENIL 0.1 MG/ML
0.2 INJECTION INTRAVENOUS
Status: DISCONTINUED | OUTPATIENT
Start: 2021-05-10 | End: 2021-05-10 | Stop reason: HOSPADM

## 2021-05-10 RX ORDER — PROPOFOL 10 MG/ML
INJECTION, EMULSION INTRAVENOUS AS NEEDED
Status: DISCONTINUED | OUTPATIENT
Start: 2021-05-10 | End: 2021-05-10 | Stop reason: HOSPADM

## 2021-05-10 RX ORDER — PROPOFOL 10 MG/ML
INJECTION, EMULSION INTRAVENOUS
Status: DISCONTINUED | OUTPATIENT
Start: 2021-05-10 | End: 2021-05-10 | Stop reason: HOSPADM

## 2021-05-10 RX ORDER — NALOXONE HYDROCHLORIDE 0.4 MG/ML
0.4 INJECTION, SOLUTION INTRAMUSCULAR; INTRAVENOUS; SUBCUTANEOUS
Status: DISCONTINUED | OUTPATIENT
Start: 2021-05-10 | End: 2021-05-10 | Stop reason: HOSPADM

## 2021-05-10 RX ADMIN — SODIUM CHLORIDE 50 ML/HR: 9 INJECTION, SOLUTION INTRAVENOUS at 13:43

## 2021-05-10 RX ADMIN — PROPOFOL INJECTABLE EMULSION 140 MCG/KG/MIN: 10 INJECTION, EMULSION INTRAVENOUS at 14:37

## 2021-05-10 RX ADMIN — PROPOFOL INJECTABLE EMULSION 50 MG: 10 INJECTION, EMULSION INTRAVENOUS at 14:37

## 2021-05-10 RX ADMIN — SODIUM CHLORIDE: 9 INJECTION, SOLUTION INTRAVENOUS at 14:32

## 2021-05-10 NOTE — PERIOP NOTES
1436  Anesthesia staff at patient's bedside administering anesthesia and monitoring patients vital signs throughout procedure. See anesthesia note. Post procedure, report received from Arturo SUTTON.    9594  Endoscope was pre-cleaned at bedside immediately following procedure by endo Nica baca. 4970  Patient tolerated procedure. Abdomen soft and patient arousable and voices no complaints. Patient transported to endoscopy recovery area. Report given to post procedure RNAustin.

## 2021-05-10 NOTE — ROUTINE PROCESS
[address] [facility] 1985 
[id number] Situation: 
Verbal report received from: Gilford Hines Procedure: Procedure(s): 
COLONOSCOPY 
ENDOSCOPIC POLYPECTOMY Background: 
 
Preoperative diagnosis: SCREENING Postoperative diagnosis: 3 polyps, hemorrhoids. :  Dr. Sade Car Assistant(s): Endoscopy RN-1: Yeimy Adames RN Specimens:  
ID Type Source Tests Collected by Time Destination 1 : transverse colon polyps Preservative Colon, Transverse  Can Avalos MD 5/10/2021 1442 Pathology 2 : cecum polyp Preservative Cecum  Can Avalos MD 5/10/2021 1449 Pathology H. Pylori  no Assessment: 
Intra-procedure medications Anesthesia gave intra-procedure sedation and medications, see anesthesia flow sheet yes Intravenous fluids: NS@ Aga Mortimer Vital signs stable Abdominal assessment: round and soft Recommendation: 
Discharge patient per MD order. Family  Permission to share finding with family or friend yes Endoscopy discharge instructions have been reviewed and given to patient. The patient verbalized understanding and acceptance of instructions. Dr. Sade Car discussed with patient procedure findings and next steps.

## 2021-05-10 NOTE — H&P
Lala Persaud M.D.  [phone]            History and Physical       NAME:  Perry Smith   :   1985   MRN:   [id number]       Referring Physician:  Dr. Rafael Mejia Date: 5/10/2021 2:29 PM    Chief Complaint:  Colon polyp surveillance    History of Present Illness:  Patient is a 39 y.o. who is seen for colon polyp surveillance. Denies any ongoing GI complaints. PMH:  Past Medical History:   Diagnosis Date    Irregular menses     Kidney stones     PCOS (polycystic ovarian syndrome)     Pseudogout        PSH:  Past Surgical History:   Procedure Laterality Date    COLONOSCOPY N/A 2/3/2020    COLONOSCOPY performed by Edenilson Gordon MD at [facility] ENDOSCOPY       Allergies:  No Known Allergies    Home Medications:  Prior to Admission Medications   Prescriptions Last Dose Informant Patient Reported? Taking?   norethindrone (Ortho Micronor) 0.35 mg tab   No No   Sig: Take 1 Tab by mouth daily.       Facility-Administered Medications: None       Hospital Medications:  Current Facility-Administered Medications   Medication Dose Route Frequency    0.9% sodium chloride infusion  50 mL/hr IntraVENous CONTINUOUS    midazolam (VERSED) injection 0.25-5 mg  0.25-5 mg IntraVENous Multiple    naloxone (NARCAN) injection 0.4 mg  0.4 mg IntraVENous Multiple    flumazeniL (ROMAZICON) 0.1 mg/mL injection 0.2 mg  0.2 mg IntraVENous Multiple    simethicone (MYLICON) 84ES/3.6WV oral drops 80 mg  1.2 mL Oral Multiple    atropine injection 0.4 mg  0.4 mg IntraVENous ONCE PRN    EPINEPHrine (ADRENALIN) 0.1 mg/mL syringe 1 mg  1 mg Endoscopically ONCE PRN       Social History:  Social History     Tobacco Use    Smoking status: Never Smoker    Smokeless tobacco: Never Used   Substance Use Topics    Alcohol use: No       Family History:  Family History   Problem Relation Age of Onset    Hypertension Mother     Stroke Mother     Other Mother         Euell Paulo Syndrome  Colon Cancer Mother     Cancer Mother         skin    Asthma Father     Elevated Lipids Father     Colon Cancer Father     Other Sister         scolosis    Colon Cancer Paternal Grandmother     Cancer Paternal Grandmother              Review of Systems:      Constitutional: negative fever, negative chills, negative weight loss  Eyes:   negative visual changes  ENT:   negative sore throat, tongue or lip swelling  Respiratory:  negative cough, negative dyspnea  Cards:  negative for chest pain, palpitations, lower extremity edema  GI:   See HPI  :  negative for frequency, dysuria  Integument:  negative for rash and pruritus  Heme:  negative for easy bruising and gum/nose bleeding  Musculoskel: negative for myalgias,  back pain and muscle weakness  Neuro: negative for headaches, dizziness, vertigo  Psych:  negative for feelings of anxiety, depression       Objective:     Patient Vitals for the past 8 hrs:   BP Temp Pulse Resp SpO2 Height Weight   05/10/21 1336 108/67 98.8 °F (37.1 °C) 66 12 99 %     05/10/21 1333      5' 3\" (1.6 m) 81 kg (178 lb 9.2 oz)     No intake/output data recorded. No intake/output data recorded. EXAM:     NEURO-a&o   HEENT-wnl   LUNGS-clear    COR-regular rate and rhythym     ABD-soft , no tenderness, no rebound, good bs     EXT-no edema     Data Review     No results for input(s): WBC, HGB, HCT, PLT, HGBEXT, HCTEXT, PLTEXT in the last 72 hours. No results for input(s): NA, K, CL, CO2, BUN, CREA, GLU, PHOS, CA in the last 72 hours. No results for input(s): AP, TBIL, TP, ALB, GLOB, GGT, AML, LPSE in the last 72 hours. No lab exists for component: SGOT, GPT, AMYP, HLPSE  No results for input(s): INR, PTP, APTT, INREXT in the last 72 hours. Patient Active Problem List   Diagnosis Code    Family planning advice Z30.09      Assessment:   · Family history of colon cancer and personal history of colon polyps   Plan:   · Colonoscopy today.      Signed By: Davion Toledo MD 5/10/2021  2:29 PM

## 2021-05-10 NOTE — ANESTHESIA PREPROCEDURE EVALUATION
Relevant Problems   No relevant active problems       Anesthetic History   No history of anesthetic complications            Review of Systems / Medical History  Patient summary reviewed and pertinent labs reviewed    Pulmonary  Within defined limits                 Neuro/Psych   Within defined limits           Cardiovascular  Within defined limits                Exercise tolerance: >4 METS     GI/Hepatic/Renal  Within defined limits              Endo/Other  Within defined limits           Other Findings              Physical Exam    Airway  Mallampati: I  TM Distance: 4 - 6 cm  Neck ROM: normal range of motion   Mouth opening: Normal     Cardiovascular    Rhythm: regular  Rate: normal         Dental    Dentition: Upper dentition intact and Lower dentition intact     Pulmonary  Breath sounds clear to auscultation               Abdominal         Other Findings            Anesthetic Plan    ASA: 1  Anesthesia type: MAC          Induction: Intravenous  Anesthetic plan and risks discussed with: Patient

## 2021-05-10 NOTE — DISCHARGE INSTRUCTIONS
[address] [facility]. Gio Hancock M.D.  [phone]            COLON DISCHARGE INSTRUCTIONS       5/10/2021    Екатерина Lee  :  1985  [facility] Medical Record Number:  [id number]      COLONOSCOPY FINDINGS:  Your colonoscopy showed: three small polyps that were removed and sent to pathology, small internal hemorrhoids, otherwise within normal.    DISCOMFORT:  Redness at IV site- apply warm compress to area; if redness or soreness persist- contact your physician  There may be a slight amount of blood passed from the rectum  Gaseous discomfort- walking, belching will help relieve any discomfort  You may not operate a vehicle for 12 hours  You may not engage in an occupation involving machinery or appliances for rest of today  You may not drink alcoholic beverages for at least 12 hours  Avoid making any critical decisions for at least 24 hour  DIET:   High fiber diet. - however -  remember your colon is empty and a heavy meal will produce gas. Avoid these foods:  vegetables, fried / greasy foods, carbonated drinks for today     ACTIVITY:  You may resume your normal daily activities it is recommended that you spend the remainder of the day resting -  avoid any strenuous activity. CALL M.D. ANY SIGN OF:   Increasing pain, nausea, vomiting  Abdominal distension (swelling)  New increased bleeding (oral or rectal)  Fever (chills)  Pain in chest area  Bloody discharge from nose or mouth   Shortness of breath    Follow-up Instructions:   Call Dr. Jenny Canas if any questions or problems. Telephone # [phone]  Biopsy results will be available in  5 to 7 days  Should have a repeat colonoscopy in 2 years.

## 2021-05-10 NOTE — PROCEDURES
Krystal Chakraborty M.D.  [phone]            5/10/2021          Colonoscopy Operative Report  Jasmina Shah  :  1985  [facility] Medical Record Number:  [id number]      Indications:    Family history of coloretal cancer (screening only), Personal history of colon polyps (screening only)     :  Faith Gutierrez MD    Referring Provider: Shayne Mares MD    Sedation:  MAC anesthesia    Pre-Procedural Exam:      Airway: clear,  No airway problems anticipated  Heart: RRR, without gallops or rubs  Lungs: clear bilaterally without wheezes, crackles, or rhonchi  Abdomen: soft, nontender, nondistended, bowel sounds present  Mental Status: awake, alert and oriented to person, place and time     Procedure Details:  After informed consent was obtained with all risks and benefits of procedure explained and preoperative exam completed, the patient was taken to the endoscopy suite and placed in the left lateral decubitus position. Upon sequential sedation as per above, a digital rectal exam was performed. The Olympus videocolonoscope  was inserted in the rectum and carefully advanced to the cecum, which was identified by the ileocecal valve and appendiceal orifice. The quality of preparation was good. The colonoscope was slowly withdrawn with careful inspection and evaluation between folds. Retroflexion in the rectum was performed. Findings:   Terminal Ileum: not intubated  Cecum: 1  Sessile polyp(s), the largest 3 mm in size;  Ascending Colon: normal  Transverse Colon: 2  Sessile polyp(s), the largest 4 mm in size; Descending Colon: normal  Sigmoid: normal  Rectum: no mucosal lesion appreciated  Grade 1 internal hemorrhoid(s);     Interventions:  3 complete polypectomy were performed using cold snare  and the polyps were  retrieved    Specimen Removed:  specimen #1, 3 and 4 mm in size, located in the transverse colon removed by cold snare and retrieved for pathology  #2, 3 mm in size, located in the cecum removed by cold snare and retrieved for pathology    Complications: None. EBL:  None. Impression: Three small polyps were removed and sent to pathology, otherwise mucosa within normal                          Small internal hemorrhoids    Recommendations:  -Repeat colonoscopy in 2 years   -High fiber diet.    -Resume normal medication(s). Discharge Disposition:  Home in the company of a  when able to ambulate.     Oscar Powell MD  5/10/2021  2:56 PM

## 2021-05-12 NOTE — ANESTHESIA POSTPROCEDURE EVALUATION
Procedure(s):  COLONOSCOPY  ENDOSCOPIC POLYPECTOMY. MAC    Anesthesia Post Evaluation      Multimodal analgesia: multimodal analgesia used between 6 hours prior to anesthesia start to PACU discharge  Patient location during evaluation: bedside  Patient participation: complete - patient participated  Level of consciousness: awake  Pain management: adequate  Airway patency: patent  Anesthetic complications: no  Cardiovascular status: acceptable  Respiratory status: acceptable  Hydration status: acceptable        INITIAL Post-op Vital signs:   Vitals Value Taken Time   BP 99/71 05/10/21 1516   Temp 36.7 °C (98 °F) 05/10/21 1502   Pulse 67 05/10/21 1517   Resp 10 05/10/21 1517   SpO2 100 % 05/10/21 1517   Vitals shown include unvalidated device data.

## 2021-06-30 DIAGNOSIS — N92.6 IRREGULAR MENSES: ICD-10-CM

## 2021-06-30 RX ORDER — ACETAMINOPHEN AND CODEINE PHOSPHATE 120; 12 MG/5ML; MG/5ML
1 SOLUTION ORAL DAILY
Qty: 1 DOSE PACK | Refills: 11 | Status: SHIPPED | OUTPATIENT
Start: 2021-06-30 | End: 2022-02-07

## 2021-08-02 ENCOUNTER — TELEPHONE (OUTPATIENT)
Dept: FAMILY MEDICINE CLINIC | Age: 36
End: 2021-08-02

## 2021-08-04 ENCOUNTER — HOSPITAL ENCOUNTER (EMERGENCY)
Dept: ULTRASOUND IMAGING | Age: 36
Discharge: HOME | End: 2021-08-04
Attending: EMERGENCY MEDICINE
Payer: MEDICAID

## 2021-08-04 ENCOUNTER — HOSPITAL ENCOUNTER (EMERGENCY)
Age: 36
Discharge: HOME | End: 2021-08-04
Attending: EMERGENCY MEDICINE
Payer: MEDICAID

## 2021-08-04 VITALS
WEIGHT: 190.48 LBS | SYSTOLIC BLOOD PRESSURE: 134 MMHG | OXYGEN SATURATION: 99 % | BODY MASS INDEX: 32.52 KG/M2 | TEMPERATURE: 97.5 F | DIASTOLIC BLOOD PRESSURE: 80 MMHG | RESPIRATION RATE: 18 BRPM | HEART RATE: 66 BPM | HEIGHT: 64 IN

## 2021-08-04 DIAGNOSIS — O20.0 THREATENED MISCARRIAGE IN EARLY PREGNANCY: ICD-10-CM

## 2021-08-04 DIAGNOSIS — Z32.01 POSITIVE URINE PREGNANCY TEST: Primary | ICD-10-CM

## 2021-08-04 LAB
ABO + RH BLD: NORMAL
ALBUMIN SERPL-MCNC: 3.8 G/DL (ref 3.5–5)
ALBUMIN/GLOB SERPL: 1.1 {RATIO} (ref 1.1–2.2)
ALP SERPL-CCNC: 63 U/L (ref 45–117)
ALT SERPL-CCNC: 21 U/L (ref 12–78)
ANION GAP SERPL CALC-SCNC: 7 MMOL/L (ref 5–15)
APPEARANCE UR: ABNORMAL
AST SERPL-CCNC: 6 U/L (ref 15–37)
BACTERIA URNS QL MICRO: NEGATIVE /HPF
BASOPHILS # BLD: 0 K/UL (ref 0–0.1)
BASOPHILS NFR BLD: 0 % (ref 0–1)
BILIRUB SERPL-MCNC: 0.9 MG/DL (ref 0.2–1)
BILIRUB UR QL: NEGATIVE
BLOOD GROUP ANTIBODIES SERPL: NORMAL
BUN SERPL-MCNC: 15 MG/DL (ref 6–20)
BUN/CREAT SERPL: 22 (ref 12–20)
CALCIUM SERPL-MCNC: 8.3 MG/DL (ref 8.5–10.1)
CHLORIDE SERPL-SCNC: 104 MMOL/L (ref 97–108)
CO2 SERPL-SCNC: 24 MMOL/L (ref 21–32)
COLOR UR: ABNORMAL
CREAT SERPL-MCNC: 0.69 MG/DL (ref 0.55–1.02)
DIFFERENTIAL METHOD BLD: ABNORMAL
EOSINOPHIL # BLD: 0.1 K/UL (ref 0–0.4)
EOSINOPHIL NFR BLD: 0 % (ref 0–7)
EPITH CASTS URNS QL MICRO: ABNORMAL /LPF
ERYTHROCYTE [DISTWIDTH] IN BLOOD BY AUTOMATED COUNT: 12.8 % (ref 11.5–14.5)
GLOBULIN SER CALC-MCNC: 3.6 G/DL (ref 2–4)
GLUCOSE SERPL-MCNC: 119 MG/DL (ref 65–100)
GLUCOSE UR STRIP.AUTO-MCNC: NEGATIVE MG/DL
HCG SERPL-ACNC: 829 MIU/ML (ref 0–6)
HCG UR QL: POSITIVE
HCT VFR BLD AUTO: 38.5 % (ref 35–47)
HGB BLD-MCNC: 13 G/DL (ref 11.5–16)
HGB UR QL STRIP: ABNORMAL
HYALINE CASTS URNS QL MICRO: ABNORMAL /LPF (ref 0–5)
IMM GRANULOCYTES # BLD AUTO: 0.1 K/UL (ref 0–0.04)
IMM GRANULOCYTES NFR BLD AUTO: 1 % (ref 0–0.5)
KETONES UR QL STRIP.AUTO: 80 MG/DL
LEUKOCYTE ESTERASE UR QL STRIP.AUTO: NEGATIVE
LIPASE SERPL-CCNC: 66 U/L (ref 73–393)
LYMPHOCYTES # BLD: 0.9 K/UL (ref 0.8–3.5)
LYMPHOCYTES NFR BLD: 8 % (ref 12–49)
MCH RBC QN AUTO: 30.8 PG (ref 26–34)
MCHC RBC AUTO-ENTMCNC: 33.8 G/DL (ref 30–36.5)
MCV RBC AUTO: 91.2 FL (ref 80–99)
MONOCYTES # BLD: 0.6 K/UL (ref 0–1)
MONOCYTES NFR BLD: 5 % (ref 5–13)
NEUTS SEG # BLD: 10.4 K/UL (ref 1.8–8)
NEUTS SEG NFR BLD: 86 % (ref 32–75)
NITRITE UR QL STRIP.AUTO: NEGATIVE
NRBC # BLD: 0 K/UL (ref 0–0.01)
NRBC BLD-RTO: 0 PER 100 WBC
PH UR STRIP: 7 [PH] (ref 5–8)
PLATELET # BLD AUTO: 269 K/UL (ref 150–400)
PMV BLD AUTO: 10.6 FL (ref 8.9–12.9)
POTASSIUM SERPL-SCNC: 3.6 MMOL/L (ref 3.5–5.1)
PROT SERPL-MCNC: 7.4 G/DL (ref 6.4–8.2)
PROT UR STRIP-MCNC: NEGATIVE MG/DL
RBC # BLD AUTO: 4.22 M/UL (ref 3.8–5.2)
RBC #/AREA URNS HPF: >100 /HPF (ref 0–5)
SODIUM SERPL-SCNC: 135 MMOL/L (ref 136–145)
SP GR UR REFRACTOMETRY: 1.02 (ref 1–1.03)
SPECIMEN EXP DATE BLD: NORMAL
UR CULT HOLD, URHOLD: NORMAL
UROBILINOGEN UR QL STRIP.AUTO: 1 EU/DL (ref 0.2–1)
WBC # BLD AUTO: 12 K/UL (ref 3.6–11)
WBC URNS QL MICRO: ABNORMAL /HPF (ref 0–4)

## 2021-08-04 PROCEDURE — 80053 COMPREHEN METABOLIC PANEL: CPT

## 2021-08-04 PROCEDURE — 84702 CHORIONIC GONADOTROPIN TEST: CPT

## 2021-08-04 PROCEDURE — 99282 EMERGENCY DEPT VISIT SF MDM: CPT

## 2021-08-04 PROCEDURE — 81001 URINALYSIS AUTO W/SCOPE: CPT

## 2021-08-04 PROCEDURE — 86901 BLOOD TYPING SEROLOGIC RH(D): CPT

## 2021-08-04 PROCEDURE — 76817 TRANSVAGINAL US OBSTETRIC: CPT

## 2021-08-04 PROCEDURE — 36415 COLL VENOUS BLD VENIPUNCTURE: CPT

## 2021-08-04 PROCEDURE — 85025 COMPLETE CBC W/AUTO DIFF WBC: CPT

## 2021-08-04 PROCEDURE — 81025 URINE PREGNANCY TEST: CPT

## 2021-08-04 PROCEDURE — 83690 ASSAY OF LIPASE: CPT

## 2021-08-04 NOTE — ED PROVIDER NOTES
Please note that this dictation was completed with [employer], the computer voice recognition software.  Quite often unanticipated grammatical, syntax, homophones, and other interpretive errors are inadvertently transcribed by the computer software.  Please disregard these errors.  Please excuse any errors that have escaped final proofreading. Patient is a 49-year-old female with history of irregular menses, kidney stones, PCOS, presenting to emergency department for evaluation of lower pelvic pain and vaginal bleeding. She states that she has been bleeding for the past 4 weeks, which is abnormal for her, and her pain began 2 days ago. She also has been having some rectal pain. She has an appointment with her PCP today but came to the emergency department instead. She denies headache, dizziness, chest pain, nausea, vomiting, diarrhea, dysuria, hematuria, vaginal discharge, or any other medical complaints at this time.             Past Medical History:   Diagnosis Date    Irregular menses     Kidney stones     PCOS (polycystic ovarian syndrome)     Pseudogout 2019       Past Surgical History:   Procedure Laterality Date    COLONOSCOPY N/A 2/3/2020    COLONOSCOPY performed by Rocio Choudhury MD at [address] [place] [address] COLONOSCOPY N/A 5/10/2021    COLONOSCOPY performed by Rocio [facility]ury MD at [facility]         Family History:   Problem Relation Age of Onset    Hypertension Mother     Stroke Mother     Other Mother         Sturge Radford Syndrome    Colon Cancer Mother     Cancer Mother         skin    Asthma Father     Elevated Lipids Father     Colon Cancer Father     Other Sister         scolosis    Colon Cancer Paternal Grandmother     Cancer Paternal Grandmother        Social History     Socioeconomic History    Marital status:      Spouse name: Not on file    Number of children: Not on file    Years of education: Not on file    Highest education level: Not on file   Occupational History    Not on file   Tobacco Use    Smoking status: Never Smoker    Smokeless tobacco: Never Used   Vaping Use    Vaping Use: Never used   Substance and Sexual Activity    Alcohol use: No    Drug use: No    Sexual activity: Yes     Partners: Male   Other Topics Concern    Not on file   Social History Narrative    Not on file     Social Determinants of Health     Financial Resource Strain:     Difficulty of Paying Living Expenses:    Food Insecurity:     Worried About Running Out of Food in the Last Year:     [address] in the Last Year:    Transportation Needs:     Lack of Transportation (Medical):  Lack of Transportation (Non-Medical):    Physical Activity:     Days of Exercise per Week:     Minutes of Exercise per Session:    Stress:     Feeling of Stress :    Social Connections:     Frequency of Communication with Friends and Family:     Frequency of Social Gatherings with Friends and Family:     Attends [employer] Services:     Active Member of Clubs or Organizations:     Attends Club or Organization Meetings:     Marital Status:    Intimate Partner Violence:     Fear of Current or Ex-Partner:     Emotionally Abused:     Physically Abused:     Sexually Abused: ALLERGIES: Patient has no known allergies. Review of Systems   Constitutional: Negative for chills and fever. HENT: Negative for ear pain and sore throat. Eyes: Negative for visual disturbance. Respiratory: Negative for cough and shortness of breath. Cardiovascular: Negative for chest pain. Gastrointestinal: Negative for abdominal pain, diarrhea and nausea. Genitourinary: Positive for pelvic pain and vaginal bleeding. Negative for dysuria, flank pain and hematuria. Musculoskeletal: Negative for back pain. Skin: Negative for color change. Neurological: Negative for dizziness and headaches. Psychiatric/Behavioral: Negative for confusion.        Vitals:    08/04/21 1046   BP: 134/80   Pulse: 66 Resp: 18   Temp: 97.5 °F (36.4 °C)   SpO2: 99%   Weight: 86.4 kg (190 lb 7.6 oz)   Height: 5' 4\" (1.626 m)            Physical Exam  Vitals and nursing note reviewed. Constitutional:       General: She is not in acute distress. Appearance: Normal appearance. She is not ill-appearing. HENT:      Head: Normocephalic and atraumatic. Mouth/Throat:      Pharynx: Oropharynx is clear. Eyes:      Extraocular Movements: Extraocular movements intact. Conjunctiva/sclera: Conjunctivae normal.   Cardiovascular:      Rate and Rhythm: Normal rate and regular rhythm. Pulmonary:      Effort: Pulmonary effort is normal.      Breath sounds: Normal breath sounds. Abdominal:      Palpations: Abdomen is soft. Tenderness: There is abdominal tenderness in the right lower quadrant, suprapubic area and left lower quadrant. There is no guarding or rebound. Negative signs include Trinh's sign and McBurney's sign. Musculoskeletal:         General: Normal range of motion. Cervical back: No rigidity. Skin:     General: Skin is warm and dry. Neurological:      General: No focal deficit present. Mental Status: She is alert and oriented to person, place, and time. Psychiatric:         Mood and Affect: Mood normal.          MDM  Number of Diagnoses or Management Options  Positive urine pregnancy test  Threatened miscarriage in early pregnancy  Diagnosis management comments: Patient is alert, afebrile, vitals stable. Presents for 1 month of vaginal bleeding and several days of pelvic pain. Abdomen soft, nondistended, with mild tenderness across the lower abdomen bilaterally. Urine pregnancy positive, quant obtained which is 829. Pelvic ultrasound unable to visualize location of pregnancy. Concern for ectopic vs early pregnancy loss. Pt . Will consult OB. CONSULT NOTE:  1:42 PM GER Wolf spoke with Dr. Melissa Rankin, Consult for OBGYN.   Discussed available diagnostic tests and clinical findings. He is in agreement with care plans as outlined. Dr. Flores Petit has evaluated the pt in the ED and recommends she is likely miscarrying and plan to have serial beta-hcgs done by her PCP. He has outlined this plan to patient. Discussed patient with ED attending Ruperto Kehr, DO who agrees with current management plan. Amount and/or Complexity of Data Reviewed  Clinical lab tests: reviewed  Tests in the radiology section of CPT®: reviewed      ED Course as of Aug 04 1255   Wed Aug 04, 2021   1233 Pregnancy test,urine (POC)(! ): Positive [EP]   1233 Beta HCG, QT(!): 829 [EP]   4847 ABO/Rh(D): O POSITIVE [EP]      ED Course User Index  [EP] Diamond Jo PA     2:19 PM  Pt has been reevaluated. There are no new complaints, changes, or physical findings at this time. All results have been reviewed with patient and/or family. Medications have been reviewed w/ pt and/or family. Pt and/or family's questions have been answered. Pt and/or family expressed good understanding of the dx/tx/rx and is in agreement with plan of care. Pt instructed and agreed to f/u w/ PCP and to return to ED upon further deterioration. Pt is ready for discharge. IMPRESSION:  1. Positive urine pregnancy test    2. Threatened miscarriage in early pregnancy        PLAN:  1. Current Discharge Medication List        2.    Follow-up Information     Follow up With Specialties Details Why Contact Info    Shelton Oswald MD [facility] Medicine Schedule an appointment as soon as possible for a visit  Schedule follow-up appointment for tomorrow for repeat lab work [address] [place] [address] 33  [phone]              Return to ED if worse     Procedures

## 2021-08-04 NOTE — CONSULTS
CAT Evaluation    Name: Tien Tang MRN: [de-identified]  SSN: [id number]    YOB: 1985  Age: 39 y.o. Sex: female      Subjective:     Reason for Admission:  Pregnancy and vaginal bleeding. History of Present Illness: Tien Tang is a 39 y.o.  female [id number] an estimated gestational age of 3-4 weeks based on LMP presents with complaint of prolonged vaginal bleeding for the past 3 weeks that is dark, 4 pads/day and no passage of tissue. She admits to crampy pain around her umbilicus. Denied lower abdominal pain, syncope, chills, fever, or vomiting. The patient was found to have a low BHCG level of 829 and an US showing no IUP or adnexal mass. The ED asked for a GYN consult.     OB History        4    Para   4    Term   4            AB        Living   4       SAB        TAB        Ectopic        Molar        Multiple   0    Live Births   4              Past Medical History:   Diagnosis Date    Irregular menses     Kidney stones     PCOS (polycystic ovarian syndrome)     Pseudogout      Past Surgical History:   Procedure Laterality Date    COLONOSCOPY N/A 2/3/2020    [facility]    COLONOSCOPY N/A 5/10/2021    COLONOSCOPY performed by Tahira Adrian MD at [address] [place] [address] Not on file   Tobacco Use    Smoking status: Never Smoker    Smokeless tobacco: Never Used   Vaping Use    Vaping Use: Never used   Substance and Sexual Activity    Alcohol use: No    Drug use: No    Sexual activity: Yes     Partners: Male     Family History   Problem Relation Age of Onset    Hypertension Mother     Stroke Mother     Other Mother         Sturge Radford Syndrome    Colon Cancer Mother     Cancer Mother         skin    Asthma Father     Elevated Lipids Father     Colon Cancer Father     Other Sister         scolosis    Colon Cancer Paternal Grandmother     Cancer Paternal Grandmother        No Known Allergies  Prior to Admission medications    Medication Sig Start Date End Date Taking? Authorizing Provider   norethindrone (Ortho Micronor) 0.35 mg tab Take 1 Tablet by mouth daily. 21   Clementina Fothergill, MD        Review of Systems- see HPI    Objective:     Vitals:    Vitals:    21 1046   BP: 134/80   Pulse: 66   Resp: 18   Temp: 97.5 °F (36.4 °C)   SpO2: 99%   Weight: 86.4 kg (190 lb 7.6 oz)   Height: 5' 4\" (1.626 m)      Temp (24hrs), Av.5 °F (36.4 °C), Min:97.5 °F (36.4 °C), Max:97.5 °F (36.4 °C)    BP  Min: 134/80  Max: 134/80     Physical Exam  Abdomen: mild epigastric tenderness without guarding or rebound; no lower abdominal tenderness, no distension. Vagina- scant old blood, no tissue. Cervical Exam: os closed with no motion tenderness   uterus: normal size, minimal tenderness  Adnexa- no tenderness or masses       Labs:   Recent Results (from the past 24 hour(s))   CBC WITH AUTOMATED DIFF    Collection Time: 21 10:56 AM   Result Value Ref Range    WBC 12.0 (H) 3.6 - 11.0 K/uL    RBC 4.22 3.80 - 5.20 M/uL    HGB 13.0 11.5 - 16.0 g/dL    HCT 38.5 35.0 - 47.0 %    MCV 91.2 80.0 - 99.0 FL    MCH 30.8 26.0 - 34.0 PG    MCHC 33.8 30.0 - 36.5 g/dL    RDW 12.8 11.5 - 14.5 %    PLATELET 441 271 - 530 K/uL    MPV 10.6 8.9 - 12.9 FL    NRBC 0.0 0  WBC    ABSOLUTE NRBC 0.00 0.00 - 0.01 K/uL    NEUTROPHILS 86 (H) 32 - 75 %    LYMPHOCYTES 8 (L) 12 - 49 %    MONOCYTES 5 5 - 13 %    EOSINOPHILS 0 0 - 7 %    BASOPHILS 0 0 - 1 %    IMMATURE GRANULOCYTES 1 (H) 0.0 - 0.5 %    ABS. NEUTROPHILS 10.4 (H) 1.8 - 8.0 K/UL    ABS. LYMPHOCYTES 0.9 0.8 - 3.5 K/UL    ABS. MONOCYTES 0.6 0.0 - 1.0 K/UL    ABS. EOSINOPHILS 0.1 0.0 - 0.4 K/UL    ABS. BASOPHILS 0.0 0.0 - 0.1 K/UL    ABS. IMM.  GRANS. 0.1 (H) 0.00 - 0.04 K/UL    DF AUTOMATED     METABOLIC PANEL, COMPREHENSIVE    Collection Time: 21 10:56 AM   Result Value Ref Range    Sodium 135 (L) 136 - 145 mmol/L    Potassium 3.6 3.5 - 5.1 mmol/L    Chloride 104 97 - 108 mmol/L    CO2 24 21 - 32 mmol/L    Anion gap 7 5 - 15 mmol/L    Glucose 119 (H) 65 - 100 mg/dL    BUN 15 6 - 20 MG/DL    Creatinine 0.69 0.55 - 1.02 MG/DL    BUN/Creatinine ratio 22 (H) 12 - 20      GFR est AA >60 >60 ml/min/1.73m2    GFR est non-AA >60 >60 ml/min/1.73m2    Calcium 8.3 (L) 8.5 - 10.1 MG/DL    Bilirubin, total 0.9 0.2 - 1.0 MG/DL    ALT (SGPT) 21 12 - 78 U/L    AST (SGOT) 6 (L) 15 - 37 U/L    Alk. phosphatase 63 45 - 117 U/L    Protein, total 7.4 6.4 - 8.2 g/dL    Albumin 3.8 3.5 - 5.0 g/dL    Globulin 3.6 2.0 - 4.0 g/dL    A-G Ratio 1.1 1.1 - 2.2     URINALYSIS W/MICROSCOPIC    Collection Time: 08/04/21 10:56 AM   Result Value Ref Range    Color YELLOW/STRAW      Appearance CLOUDY (A) CLEAR      Specific gravity 1.024 1.003 - 1.030      pH (UA) 7.0 5.0 - 8.0      Protein Negative NEG mg/dL    Glucose Negative NEG mg/dL    Ketone 80 (A) NEG mg/dL    Bilirubin Negative NEG      Blood LARGE (A) NEG      Urobilinogen 1.0 0.2 - 1.0 EU/dL    Nitrites Negative NEG      Leukocyte Esterase Negative NEG      WBC 0-4 0 - 4 /hpf    RBC >100 (H) 0 - 5 /hpf    Epithelial cells FEW FEW /lpf    Bacteria Negative NEG /hpf    Hyaline cast 0-2 0 - 5 /lpf   URINE CULTURE HOLD SAMPLE    Collection Time: 08/04/21 10:56 AM    Specimen: Serum; Urine   Result Value Ref Range    Urine culture hold        Urine on hold in Microbiology dept for 2 days. If unpreserved urine is submitted, it cannot be used for addtional testing after 24 hours, recollection will be required.    LIPASE    Collection Time: 08/04/21 10:56 AM   Result Value Ref Range    Lipase 66 (L) 73 - 393 U/L   BETA HCG, QT    Collection Time: 08/04/21 10:56 AM   Result Value Ref Range    Beta HCG,  (H) 0 - 6 MIU/ML   HCG URINE, QL. - POC    Collection Time: 08/04/21 10:59 AM   Result Value Ref Range    Pregnancy test,urine (POC) Positive (A) NEG     TYPE & SCREEN    Collection Time: 08/04/21 11:38 AM   Result Value Ref Range    Crossmatch Expiration 2021,1265     ABO/Rh(D) O POSITIVE     Antibody screen NEG        Patient Active Problem List   Diagnosis Code    Family planning advice Z30.09     Assessment and Plan:   Impression: Probable early spontaneous . Low probability of ectopic pregnancy. Plan: 1 Pt will get a repeat quant BHCG with her private MD Dr. Oliva Alicia on 2021 and then serial quant BHCGs every 2 days. If BHCG level trends up or plateaus, would consider a methotrexate shot in case pt has an early undetectable ectopic pregnancy. 2 If pt has a spontaneous AB, expectant treatment acceptable since bleeding is scant. She does not need a D & C at this time.         Signed By:  Ирина Li MD     2021

## 2021-08-04 NOTE — ED TRIAGE NOTES
Pt arrives to Ed with complaints of extreme abdominal pain\"all over\" since monday. Pt also reports bloating, heavy vaginal bleeding x 1  Month and rectal pain. Pt has appt with family doctor for bleeding today at 0 but needs to be seen for abd pain. Pt LBM today. Pt denies NVD.

## 2021-08-04 NOTE — ED NOTES
10:46 AM  I have evaluated the patient as the Provider in Triage. I have reviewed Her vital signs and the triage nurse assessment. I have talked with the patient and any available family and advised that I am the provider in triage and have ordered the appropriate study to initiate their work up based on the clinical presentation during my assessment. I have advised that the patient will be accommodated in the Main ED as soon as possible. I have also requested to contact the triage nurse or myself immediately if the patient experiences any changes in their condition during this brief waiting period.   GER Walden

## 2021-08-05 ENCOUNTER — OFFICE VISIT (OUTPATIENT)
Dept: FAMILY MEDICINE CLINIC | Age: 36
End: 2021-08-05
Payer: MEDICAID

## 2021-08-05 VITALS
HEART RATE: 68 BPM | WEIGHT: 188 LBS | OXYGEN SATURATION: 100 % | TEMPERATURE: 98.4 F | RESPIRATION RATE: 20 BRPM | HEIGHT: 64 IN | SYSTOLIC BLOOD PRESSURE: 109 MMHG | BODY MASS INDEX: 32.1 KG/M2 | DIASTOLIC BLOOD PRESSURE: 74 MMHG

## 2021-08-05 DIAGNOSIS — Z3A.01 LESS THAN 8 WEEKS GESTATION OF PREGNANCY: Primary | ICD-10-CM

## 2021-08-05 PROCEDURE — 99213 OFFICE O/P EST LOW 20 MIN: CPT | Performed by: STUDENT IN AN ORGANIZED HEALTH CARE EDUCATION/TRAINING PROGRAM

## 2021-08-05 NOTE — PROGRESS NOTES
Yi Alfaro is an 39 y.o. female who presents for Hospital Follow Up (vaginal bleeding x 1 month)    Vaginal bleeding  Patient presented to ED yesterday afternoon for 4 weeks of vaginal bleeding with crampy abdominal pain. She noted no passage of tissue at that time. HCG was positive, and she was determined to be about 3-4 weeks along, by LMP. She received a transvaginal ultrasound that showed no intrauterine pregnancy. She was seen by OBGYN in hospital, who determined that her risk of ectopic pregnancy was low and recommended follow up. She is seen today with continued vaginal bleeding. She is using 4 pads a day, unchanged from yesterday. She has pain, located in her rectal area, which is helped with extra strength tylenol. Pain is much improved from yesterday. She has noticed passage of any tissue, says maybe small tissue. Has continued taking the norethindrone. Denies syncope, fever, chills, vomiting. Review of Systems   Review of Systems   Constitutional: Negative for chills and fever. Gastrointestinal: Positive for abdominal pain. Negative for nausea and vomiting. Genitourinary: Positive for pelvic pain and vaginal bleeding. Neurological: Negative for dizziness, syncope and light-headedness. Medical History  Past Medical History:   Diagnosis Date    Irregular menses     Kidney stones     PCOS (polycystic ovarian syndrome)     Pseudogout 2019       Medications  Current Outpatient Medications   Medication Sig    norethindrone (Ortho Micronor) 0.35 mg tab Take 1 Tablet by mouth daily. No current facility-administered medications for this visit.        Immunizations   Immunization History   Administered Date(s) Administered    Influenza Vaccine Flexuspine) PF (>6 Mo Flulaval, Fluarix, and >3 Yrs Afluria, Fluzone 36247) 09/05/2017, 10/16/2018, 11/26/2019    Tdap 07/11/2017       Allergies   No Known Allergies    Objective   Vital Signs  Visit Vitals  /74 (BP 1 Location: Left upper arm, BP Patient Position: Sitting)   Pulse 68   Temp 98.4 °F (36.9 °C) (Temporal)   Resp 20   Ht 5' 4.02\" (1.626 m)   Wt 188 lb (85.3 kg)   SpO2 100%   BMI 32.25 kg/m²       Physical Examination  Physical Exam  Constitutional:       Appearance: Normal appearance. HENT:      Head: Normocephalic and atraumatic. Cardiovascular:      Rate and Rhythm: Normal rate and regular rhythm. Pulses: Normal pulses. Heart sounds: Normal heart sounds. No murmur heard. Pulmonary:      Effort: Pulmonary effort is normal. No respiratory distress. Breath sounds: Normal breath sounds. Abdominal:      General: Abdomen is flat. Bowel sounds are normal.      Palpations: Abdomen is soft. Tenderness: There is abdominal tenderness (L flank and R flank). Skin:     General: Skin is warm and dry. Neurological:      Mental Status: She is alert. Assessment and Plan   Davion Razo is a 39 y.o. female who presents for Hospital Follow Up (vaginal bleeding x 1 month)        ICD-10-CM ICD-9-CM    1. Less than 8 weeks gestation of pregnancy  Z3A.01 V22.2 BETA HCG, QT      BETA HCG, QT      BETA HCG, QT      BETA HCG, QT   Should stop norethindrone. Follow up for quantitative HCG trending. If HCG levels increase or plateau, she may need methotrexate for treatment of an ectopic pregnancy. Follow-up and Dispositions    · Return in about 1 day (around 8/6/2021) for HCG Quant lab visit. Pt was discussed with Dr David Godoy (attending physician). I have reviewed patient medical and social history and medications. I have reviewed pertinent labs results and other data. I have discussed the diagnosis with the patient and the intended plan as seen in the above orders. The patient has received an after-visit summary and questions were answered concerning future plans. I have discussed medication side effects and warnings with the patient as well.     John Jacobs MD  Resident, [facility]  08/05/21

## 2021-08-05 NOTE — PROGRESS NOTES
Chief Complaint   Patient presents with   [facility] Follow Up     vaginal bleeding x 1 month     1. Have you been to the ER, urgent care clinic since your last visit? Hospitalized since your last visit? Yes 8/4/21 [facility]      2. Have you seen or consulted any other health care providers outside of the [address] [place] [address] since your last visit? Include any pap smears or colon screening.    no    Visit Vitals  /74 (BP 1 Location: Left upper arm, BP Patient Position: Sitting)   Pulse 68   Temp 98.4 °F (36.9 °C) (Temporal)   Resp 20   Ht 5' 4.02\" (1.626 m)   Wt 188 lb (85.3 kg)   SpO2 100%   BMI 32.25 kg/m²

## 2021-08-05 NOTE — PATIENT INSTRUCTIONS
Suspected Ectopic Pregnancy: Care Instructions  Your Care Instructions     Your doctor thinks you may have an ectopic pregnancy. This means that a fertilized egg has attached to a place outside the uterus. In most of these cases, the egg grows in a fallopian tube. This is also called a tubal pregnancy. In rare cases, the egg grows in an ovary or another place in the belly. An ectopic pregnancy cannot develop normally. It can be painful and very dangerous. In some cases, the ectopic pregnancy ends and the body absorbs it over time. If so, treatment is not needed. Your doctor is sending you home to watch for belly pain or bleeding. Call your doctor right away if you have any new or increased pain or bleeding. If you have other symptoms, such as shoulder pain, dizziness, lightheadedness, or fainting, call your doctor right away. These could be signs of internal bleeding. You also may need to come back for more tests. If an ectopic pregnancy does not end on its own, the only treatment is medicine or surgery to end the pregnancy. An ectopic pregnancy can be very upsetting. But you should not blame yourself. You could not have done anything to prevent it. The doctor has checked you carefully. But problems can develop later. If you notice any problems or new symptoms, get medical treatment right away. Follow-up care is a key part of your treatment and safety. Be sure to make and go to all appointments, and call your doctor if you are having problems. It's also a good idea to know your test results and keep a list of the medicines you take. How can you care for yourself at home? · Rest when you feel tired. You may be more tired than normal for a few weeks. · If you are treated with methotrexate:  ? Your doctor will let you know if you can take over-the-counter pain medicine, such as acetaminophen (Tylenol), ibuprofen (Advil, Motrin), or naproxen (Aleve). Read and follow all instructions on the label.   ? Do not take two or more pain medicines at the same time unless the doctor told you to. Many pain medicines have acetaminophen, which is Tylenol. Too much acetaminophen (Tylenol) can be harmful. ? Do not drink alcohol. ? Do not take vitamins that contain folic acid, such as prenatal vitamins. · Use pads instead of tampons until your doctor says it is okay. · It may help to talk with family, friends, or a counselor if you are having trouble dealing with the loss of your pregnancy. If you feel sad or depressed for longer than 2 weeks, talk to a counselor or your doctor. · Do not have sex until your doctor says it is okay. · Talk with your doctor about any future pregnancy plans. When should you call for help? Call 911 anytime you think you may need emergency care. For example, call if:    · You have sudden, severe pain in your belly or pelvis.     · You passed out (lost consciousness).     · You have severe vaginal bleeding. Call your doctor now or seek immediate medical care if:    · You are dizzy or lightheaded, or you feel like you may faint.     · You have new or increased pain in your belly or pelvis.     · Your vaginal bleeding is getting worse.     · You have increased pain in the vaginal area.     · You have new pain in your shoulder.     · You have a fever. Watch closely for changes in your health, and be sure to contact your doctor if:    · You have new or worse vaginal discharge.     · You do not get better as expected. Where can you learn more? Go to http://www.gray.com/  Enter [id number] in the search box to learn more about \"Suspected Ectopic Pregnancy: Care Instructions. \"  Current as of: October 8, 2020               Content Version: 12.8  © 3870-9659 [employer]. Care instructions adapted under license by [employer] (which disclaims liability or warranty for this information).  If you have questions about a medical condition or this instruction, always ask your healthcare professional. [place] [address] any warranty or liability for your use of this information.

## 2021-08-06 ENCOUNTER — LAB ONLY (OUTPATIENT)
Dept: FAMILY MEDICINE CLINIC | Age: 36
End: 2021-08-06

## 2021-08-06 DIAGNOSIS — Z3A.01 LESS THAN 8 WEEKS GESTATION OF PREGNANCY: ICD-10-CM

## 2021-08-06 LAB — HCG SERPL-ACNC: 801 MIU/ML (ref 0–6)

## 2021-08-09 ENCOUNTER — HOSPITAL ENCOUNTER (EMERGENCY)
Age: 36
Discharge: HOME | End: 2021-08-09
Attending: EMERGENCY MEDICINE
Payer: MEDICAID

## 2021-08-09 ENCOUNTER — LAB ONLY (OUTPATIENT)
Dept: FAMILY MEDICINE CLINIC | Age: 36
End: 2021-08-09

## 2021-08-09 VITALS
DIASTOLIC BLOOD PRESSURE: 73 MMHG | OXYGEN SATURATION: 98 % | HEART RATE: 78 BPM | HEIGHT: 64 IN | RESPIRATION RATE: 17 BRPM | WEIGHT: 188 LBS | BODY MASS INDEX: 32.1 KG/M2 | SYSTOLIC BLOOD PRESSURE: 111 MMHG | TEMPERATURE: 97.1 F

## 2021-08-09 DIAGNOSIS — R10.2 PELVIC PAIN: ICD-10-CM

## 2021-08-09 DIAGNOSIS — O03.9 MISCARRIAGE: ICD-10-CM

## 2021-08-09 DIAGNOSIS — K62.89 RECTAL PAIN: Primary | ICD-10-CM

## 2021-08-09 DIAGNOSIS — Z3A.01 LESS THAN 8 WEEKS GESTATION OF PREGNANCY: ICD-10-CM

## 2021-08-09 LAB
ANION GAP SERPL CALC-SCNC: 9 MMOL/L (ref 5–15)
BASOPHILS # BLD: 0.1 K/UL (ref 0–0.1)
BASOPHILS NFR BLD: 1 % (ref 0–1)
BUN SERPL-MCNC: 18 MG/DL (ref 6–20)
BUN/CREAT SERPL: 35 (ref 12–20)
CALCIUM SERPL-MCNC: 9.4 MG/DL (ref 8.5–10.1)
CHLORIDE SERPL-SCNC: 103 MMOL/L (ref 97–108)
CO2 SERPL-SCNC: 27 MMOL/L (ref 21–32)
CREAT SERPL-MCNC: 0.51 MG/DL (ref 0.55–1.02)
DIFFERENTIAL METHOD BLD: ABNORMAL
EOSINOPHIL # BLD: 0.1 K/UL (ref 0–0.4)
EOSINOPHIL NFR BLD: 0 % (ref 0–7)
ERYTHROCYTE [DISTWIDTH] IN BLOOD BY AUTOMATED COUNT: 12.7 % (ref 11.5–14.5)
GLUCOSE SERPL-MCNC: 85 MG/DL (ref 65–100)
HCG SERPL-ACNC: 574 MIU/ML (ref 0–6)
HCT VFR BLD AUTO: 35.5 % (ref 35–47)
HGB BLD-MCNC: 12 G/DL (ref 11.5–16)
IMM GRANULOCYTES # BLD AUTO: 0.1 K/UL (ref 0–0.04)
IMM GRANULOCYTES NFR BLD AUTO: 1 % (ref 0–0.5)
LYMPHOCYTES # BLD: 1 K/UL (ref 0.8–3.5)
LYMPHOCYTES NFR BLD: 8 % (ref 12–49)
MCH RBC QN AUTO: 30.8 PG (ref 26–34)
MCHC RBC AUTO-ENTMCNC: 33.8 G/DL (ref 30–36.5)
MCV RBC AUTO: 91 FL (ref 80–99)
MONOCYTES # BLD: 0.7 K/UL (ref 0–1)
MONOCYTES NFR BLD: 6 % (ref 5–13)
NEUTS SEG # BLD: 10.5 K/UL (ref 1.8–8)
NEUTS SEG NFR BLD: 84 % (ref 32–75)
NRBC # BLD: 0 K/UL (ref 0–0.01)
NRBC BLD-RTO: 0 PER 100 WBC
PLATELET # BLD AUTO: 272 K/UL (ref 150–400)
PMV BLD AUTO: 10.8 FL (ref 8.9–12.9)
POTASSIUM SERPL-SCNC: 3.8 MMOL/L (ref 3.5–5.1)
RBC # BLD AUTO: 3.9 M/UL (ref 3.8–5.2)
SODIUM SERPL-SCNC: 139 MMOL/L (ref 136–145)
WBC # BLD AUTO: 12.3 K/UL (ref 3.6–11)

## 2021-08-09 PROCEDURE — 36415 COLL VENOUS BLD VENIPUNCTURE: CPT

## 2021-08-09 PROCEDURE — 96374 THER/PROPH/DIAG INJ IV PUSH: CPT

## 2021-08-09 PROCEDURE — 99283 EMERGENCY DEPT VISIT LOW MDM: CPT

## 2021-08-09 PROCEDURE — 85025 COMPLETE CBC W/AUTO DIFF WBC: CPT

## 2021-08-09 PROCEDURE — 74011250636 HC RX REV CODE- 250/636: Performed by: EMERGENCY MEDICINE

## 2021-08-09 PROCEDURE — 80048 BASIC METABOLIC PNL TOTAL CA: CPT

## 2021-08-09 RX ORDER — IBUPROFEN 800 MG/1
TABLET ORAL
Qty: 30 TABLET | Refills: 0 | Status: SHIPPED | OUTPATIENT
Start: 2021-08-09 | End: 2021-08-19

## 2021-08-09 RX ORDER — KETOROLAC TROMETHAMINE 30 MG/ML
30 INJECTION, SOLUTION INTRAMUSCULAR; INTRAVENOUS ONCE
Status: COMPLETED | OUTPATIENT
Start: 2021-08-09 | End: 2021-08-09

## 2021-08-09 RX ADMIN — SODIUM CHLORIDE 1000 ML: 9 INJECTION, SOLUTION INTRAVENOUS at 19:55

## 2021-08-09 RX ADMIN — KETOROLAC TROMETHAMINE 30 MG: 30 INJECTION, SOLUTION INTRAMUSCULAR; INTRAVENOUS at 19:55

## 2021-08-09 NOTE — ED PROVIDER NOTES
40-year-old woman presents in the setting of a miscarriage for the last 5 weeks with downtrending beta hCG and no pregnancy visible on pelvic ultrasound with lower abdominal and rectal pain. She is reporting a lot of cramping and discomfort that is worse in her rectum and worse with sitting. She has been taking Tylenol for her pain but reports that has not been helping her symptoms. She reports that her vaginal bleeding has been scant and that she has not had any chest pain, shortness of breath, but that she has had lightheadedness & dizziness and the pain intensifies. The history is provided by the patient.         Past Medical History:   Diagnosis Date    Irregular menses     Kidney stones     PCOS (polycystic ovarian syndrome)     Pseudogout 2019       Past Surgical History:   Procedure Laterality Date    COLONOSCOPY N/A 2/3/2020    COLONOSCOPY performed by Rocio Choudhury MD at [address] [place] [address] COLONOSCOPY N/A 5/10/2021    COLONOSCOPY performed by Rocio Choudhury MD at [facility] ENDOSCOPY         Family History:   Problem Relation Age of Onset    Hypertension Mother     Stroke Mother     Other Mother         Sturge Radford Syndrome    Colon Cancer Mother     Cancer Mother         skin    Asthma Father     Elevated Lipids Father     Colon Cancer Father     Other Sister         scolosis    Colon Cancer Paternal Grandmother     Cancer Paternal Grandmother        Social History     Socioeconomic History    Marital status:      Spouse name: Not on file    Number of children: Not on file    Years of education: Not on file    Highest education level: Not on file   Occupational History    Not on file   Tobacco Use    Smoking status: Never Smoker    Smokeless tobacco: Never Used   Vaping Use    Vaping Use: Never used   Substance and Sexual Activity    Alcohol use: No    Drug use: No    Sexual activity: Yes     Partners: Male   Other Topics Concern    Not on file   Social History Narrative    Not on file     Social Determinants of Health     Financial Resource Strain:     Difficulty of Paying Living Expenses:    Food Insecurity:     Worried About Running Out of Food in the Last Year:     [address] in the Last Year:    Transportation Needs:     Lack of Transportation (Medical):  Lack of Transportation (Non-Medical):    Physical Activity:     Days of Exercise per Week:     Minutes of Exercise per Session:    Stress:     Feeling of Stress :    Social Connections:     Frequency of Communication with Friends and Family:     Frequency of Social Gatherings with Friends and Family:     Attends Scientologist Services:     Active Member of Clubs or Organizations:     Attends Club or Organization Meetings:     Marital Status:    Intimate Partner Violence:     Fear of Current or Ex-Partner:     Emotionally Abused:     Physically Abused:     Sexually Abused: ALLERGIES: Patient has no known allergies. Review of Systems   Constitutional: Negative for chills and fever. Respiratory: Negative for shortness of breath. Cardiovascular: Negative for chest pain. Gastrointestinal: Positive for abdominal pain and rectal pain. Negative for constipation, diarrhea and vomiting. Genitourinary: Positive for pelvic pain and vaginal bleeding. Neurological: Positive for dizziness and light-headedness. All other systems reviewed and are negative. Vitals:    08/09/21 1817 08/09/21 2115   BP: 116/62 111/73   Pulse: 74 78   Resp: 15 17   Temp: 97.8 °F (36.6 °C) 97.1 °F (36.2 °C)   SpO2: 99% 98%   Weight: 85.3 kg (188 lb)    Height: 5' 4\" (1.626 m)             Physical Exam  Vitals and nursing note reviewed. Constitutional:       Appearance: Normal appearance. She is well-developed. She is not ill-appearing. HENT:      Head: Normocephalic and atraumatic. Eyes:      Pupils: Pupils are equal, round, and reactive to light.    Cardiovascular:      Rate and Rhythm: Normal rate and regular rhythm. Pulmonary:      Effort: Pulmonary effort is normal.      Breath sounds: Normal breath sounds. Abdominal:      General: There is no distension. Palpations: Abdomen is soft. Tenderness: There is abdominal tenderness. There is no guarding or rebound. Genitourinary:     Rectum: Normal.   Musculoskeletal:      Cervical back: Normal range of motion and neck supple. Skin:     General: Skin is warm and dry. Capillary Refill: Capillary refill takes less than 2 seconds. Neurological:      General: No focal deficit present. Mental Status: She is alert and oriented to person, place, and time. Psychiatric:         Mood and Affect: Mood normal.         Behavior: Behavior normal.          [facility]       Bedside US    Date/Time: 2021 6:07 PM  Performed by: Nallely Velásquez MD  Authorized by: Nallely Velásquez MD     Verbal consent obtained: Yes    Given by:  Patient  Performed by: Attending  Type of procedure:  FAST  Indications:  Abdominal pain and pregnancy  Hepatorenal:  Adequate  Perisplenic:  Adequate  Suprapubic:  Adequate  R thorax for fluid:  Adequate  L thorax for fluid:  Adequate  Hepatorenal free fluid: absent    Perisplenic free fluid: absent    Suprapubic free fluid: absent    Right thoracic fluid: absent    Left thoracic fluid: absent    Peritoneal free fluid: absent    Right thoracic fluid:  Absent  Left thoracic fluid:  Absent    Confirmation study:  I have advised the patient to obtain a confirmatory study as an outpatient. MEDICAL DECISION MAKIN y.o. female presents with Miscarriage    Differential diagnosis includes but not limited to: Ruptured ectopic, incomplete AB, sepsis, shock, anemia    LABORATORY TESTS:  Labs Reviewed   CBC WITH AUTOMATED DIFF - Abnormal; Notable for the following components:       Result Value    WBC 12.3 (*)     NEUTROPHILS 84 (*)     LYMPHOCYTES 8 (*)     IMMATURE GRANULOCYTES 1 (*)     ABS. NEUTROPHILS 10.5 (*)     ABS. IMM. GRANS. 0.1 (*)     All other components within normal limits   METABOLIC PANEL, BASIC - Abnormal; Notable for the following components:    Creatinine 0.51 (*)     BUN/Creatinine ratio 35 (*)     All other components within normal limits       IMAGING RESULTS:  No orders to display       MEDICATIONS GIVEN:  Medications   ketorolac (TORADOL) injection 30 mg (30 mg IntraVENous Given 8/9/21 1955)   sodium chloride 0.9 % bolus infusion 1,000 mL (0 mL IntraVENous IV Completed 8/9/21 2100)       PROGRESS NOTE:    Patient's symptoms have improved after treatment  Presents with rectal and pelvic pain in the setting of an ongoing miscarriage. I suspect symptoms are secondary to her miscarriage and I do not see any evidence of a ruptured ectopic or any other complications of her pregnancy. The patient's results have been reviewed with them and/or available family. Patient and/or family verbally conveyed their understanding and agreement of the patient's signs, symptoms, diagnosis, treatment and prognosis and additionally agree to follow up as recommended in the discharge instructions or to return to the Emergency Room should their condition change prior to their follow-up appointment. The patient/family verbally agrees with the care-plan and verbally conveys that all of their questions have been answered. The discharge instructions have also been provided to the patient and/or family with some educational information regarding the patient's diagnosis as well a list of reasons why the patient would want to return to the ER prior to their follow-up appointment, should their condition change. IMPRESSION:  1. Rectal pain    2. Pelvic pain    3.  Miscarriage        Discharge  Discharge Medication List as of 8/9/2021  8:17 PM      START taking these medications    Details   ibuprofen (MOTRIN) 800 mg tablet Take 1 Tablet by mouth three (3) times daily for 2 days, THEN 1 Tablet every eight (8) hours as needed for Pain for up to 8 days., Normal, Disp-30 Tablet, R-0         CONTINUE these medications which have NOT CHANGED    Details   norethindrone (Ortho Micronor) 0.35 mg tab Take 1 Tablet by mouth daily. , Normal, Disp-1 Dose Pack, R-11           Follow-up Information     Follow up With Specialties Details Why Contact Info    Beth Mcpherson MD Family Medicine Schedule an appointment as soon as possible for a visit in 3 days As needed [address] [facility] 11            Ricky Ludwig MD          Please note that this dictation was completed with [employer], the computer voice recognition software. Quite often unanticipated grammatical, syntax, homophones, and other interpretive errors are inadvertently transcribed by the computer software. Please disregard these errors. Please excuse any errors that have escaped final proofreading.

## 2021-08-09 NOTE — ED TRIAGE NOTES
Pt to ED for active miscarriage. States she has been bleeding for almost 5 weeks. Pt reports pain and is concerned for ectopic. Having abd and rectal cramping.

## 2021-08-10 ENCOUNTER — TELEPHONE (OUTPATIENT)
Dept: FAMILY MEDICINE CLINIC | Age: 36
End: 2021-08-10

## 2021-08-10 NOTE — ED NOTES
Dr. Carmen Garcia reviewed discharge instructions with the patient. The patient verbalized understanding. The patient was given opportunity for questions. Patient discharged in stable condition to the waiting room via ambulatory.

## 2021-08-11 ENCOUNTER — TELEPHONE (OUTPATIENT)
Dept: FAMILY MEDICINE CLINIC | Age: 36
End: 2021-08-11

## 2021-08-11 NOTE — TELEPHONE ENCOUNTER
Called patient, confirmed identity with name and date of birth. Discussed lab results confirming miscarriage. Patient reports she was seen in ED on 8/9 for increased abdominal pain. Ruptured ectopic was ruled out by bedside ultrasound by ED physician. Patient's pain improved with toradol and ibuprofen. She has continued ibuprofen at home with moderate relief. All questions were answered.

## 2021-08-12 NOTE — PROGRESS NOTES
CC: hospital f/u for miscarriage  Subjective   Colin Garcia is an 39 y.o. female with PMHx of irregular menses, kidney stones, PCOS, and pseudogout who presents for hospital f/u for a miscarriage for the past 5wks with downtrending hCG ([id number]) and no pregnancy visible on pelvic US. Currently, she is bleeding minimally, has lower abdominal pain, and cramping. She passed fetal tissue yesterday and passed gestational sac today. Her pain today is a 1/10. Pain has been controlled with ibuprofen 800mg q8h as needed. She says she has been taking her progesterone only birth control (norethindrone) with good compliance and is asking for other birth control options since it was unsuccessful. Pt reports she has gotten the Depot shot in the past and had a reaction of headache, depression, breast tenderness, and lucid nightmares. Review of Systems   Review of Systems   Constitutional: Negative for fever. Respiratory: Negative for apnea, chest tightness and shortness of breath. Cardiovascular: Negative for chest pain and leg swelling. Gastrointestinal: Negative for blood in stool, constipation, diarrhea, nausea and vomiting. Genitourinary: Positive for vaginal bleeding and vaginal discharge. Negative for hematuria. Abdominal cramping    Neurological: Negative for dizziness, syncope, light-headedness and headaches. Psychiatric/Behavioral: Negative for agitation and suicidal ideas. The patient is not nervous/anxious. Allergies   No Known Allergies    Medications  Current Outpatient Medications   Medication Sig    ibuprofen (MOTRIN) 800 mg tablet Take 1 Tablet by mouth three (3) times daily for 2 days, THEN 1 Tablet every eight (8) hours as needed for Pain for up to 8 days.  norethindrone (Ortho Micronor) 0.35 mg tab Take 1 Tablet by mouth daily. No current facility-administered medications for this visit.        Medical History  Past Medical History:   Diagnosis Date    Irregular menses     Kidney stones     PCOS (polycystic ovarian syndrome)     Pseudogout 2019       Immunizations   Immunization History   Administered Date(s) Administered    Influenza Vaccine Medstory) PF (>6 Mo Flulaval, Fluarix, and >3 Yrs Afluria, Fluzone 25154) 09/05/2017, 10/16/2018, 11/26/2019    Tdap 07/11/2017       Objective   Vital Signs  Visit Vitals  /66 (BP 1 Location: Left upper arm, BP Patient Position: Sitting, BP Cuff Size: Adult)   Pulse 70   Temp 98.5 °F (36.9 °C) (Oral)   Resp 16   Ht 5' 2.75\" (1.594 m)   Wt 192 lb 6.4 oz (87.3 kg)   SpO2 98%   BMI 34.35 kg/m²       Physical Examination  Physical Exam  Constitutional:       General: She is not in acute distress. Appearance: Normal appearance. Cardiovascular:      Rate and Rhythm: Normal rate and regular rhythm. Heart sounds: Normal heart sounds. Pulmonary:      Effort: Pulmonary effort is normal.      Breath sounds: Normal breath sounds. Abdominal:      General: Bowel sounds are normal.      Palpations: Abdomen is soft. Tenderness: There is abdominal tenderness. There is no guarding or rebound. Comments: Tenderness to palpation of lower abdomen     Psychiatric:         Mood and Affect: Mood normal.         Behavior: Behavior normal.          Assessment and Plan   Debra Newell is a 39 y.o. who presents for a hospital f/u for miscarriage. 1. Miscarriage  - BETA HCG, QT; Future  - CBC WITH AUTOMATED DIFF; Future  - Pt is interested in getting an IUD. She was extensively counseled on her different birth control options with the risks, side effects and efficacies explained. Pt was counseled on the placement of an IUD potentially increasing ovarian cyst size. She is still willing to consider getting IUD.  - f/u in 1 week with lab visit to recheck hCG levels  - Pt denied needing counseling or support services     I have discussed the aforementioned diagnoses and plan with the patient in detail.  I have provided information in person and/or in AVS. All questions answered prior to discharge.       I discussed this patient with Dr. Natalie Escamilla (Attending Physician)   Signed By:  Myriam Jin DO    Family Medicine Resident

## 2021-08-13 ENCOUNTER — OFFICE VISIT (OUTPATIENT)
Dept: FAMILY MEDICINE CLINIC | Age: 36
End: 2021-08-13
Payer: MEDICAID

## 2021-08-13 VITALS
TEMPERATURE: 98.5 F | BODY MASS INDEX: 34.09 KG/M2 | WEIGHT: 192.4 LBS | RESPIRATION RATE: 16 BRPM | HEIGHT: 63 IN | OXYGEN SATURATION: 98 % | SYSTOLIC BLOOD PRESSURE: 111 MMHG | DIASTOLIC BLOOD PRESSURE: 66 MMHG | HEART RATE: 70 BPM

## 2021-08-13 DIAGNOSIS — O03.9 MISCARRIAGE: ICD-10-CM

## 2021-08-13 DIAGNOSIS — E28.2 PCOS (POLYCYSTIC OVARIAN SYNDROME): ICD-10-CM

## 2021-08-13 DIAGNOSIS — O03.9 MISCARRIAGE: Primary | ICD-10-CM

## 2021-08-13 LAB
BASOPHILS # BLD: 0.1 K/UL (ref 0–0.1)
BASOPHILS NFR BLD: 1 % (ref 0–1)
DIFFERENTIAL METHOD BLD: ABNORMAL
EOSINOPHIL # BLD: 0.2 K/UL (ref 0–0.4)
EOSINOPHIL NFR BLD: 2 % (ref 0–7)
ERYTHROCYTE [DISTWIDTH] IN BLOOD BY AUTOMATED COUNT: 13.2 % (ref 11.5–14.5)
HCT VFR BLD AUTO: 33.6 % (ref 35–47)
HGB BLD-MCNC: 10.8 G/DL (ref 11.5–16)
IMM GRANULOCYTES # BLD AUTO: 0.1 K/UL (ref 0–0.04)
IMM GRANULOCYTES NFR BLD AUTO: 1 % (ref 0–0.5)
LYMPHOCYTES # BLD: 1.4 K/UL (ref 0.8–3.5)
LYMPHOCYTES NFR BLD: 15 % (ref 12–49)
MCH RBC QN AUTO: 30.7 PG (ref 26–34)
MCHC RBC AUTO-ENTMCNC: 32.1 G/DL (ref 30–36.5)
MCV RBC AUTO: 95.5 FL (ref 80–99)
MONOCYTES # BLD: 0.9 K/UL (ref 0–1)
MONOCYTES NFR BLD: 10 % (ref 5–13)
NEUTS SEG # BLD: 6.6 K/UL (ref 1.8–8)
NEUTS SEG NFR BLD: 71 % (ref 32–75)
NRBC # BLD: 0 K/UL (ref 0–0.01)
NRBC BLD-RTO: 0 PER 100 WBC
PLATELET # BLD AUTO: 297 K/UL (ref 150–400)
PMV BLD AUTO: 11.4 FL (ref 8.9–12.9)
RBC # BLD AUTO: 3.52 M/UL (ref 3.8–5.2)
WBC # BLD AUTO: 9.2 K/UL (ref 3.6–11)

## 2021-08-13 PROCEDURE — 99213 OFFICE O/P EST LOW 20 MIN: CPT

## 2021-08-13 NOTE — PROGRESS NOTES
Chief Complaint   Patient presents with   [facility]     Patient presents to follow up for ED visit from spontaneous . Visit Vitals  /66 (BP 1 Location: Left upper arm, BP Patient Position: Sitting, BP Cuff Size: Adult)   Pulse 70   Temp 98.5 °F (36.9 °C) (Oral)   Resp 16   Ht 5' 2.75\" (1.594 m)   Wt 192 lb 6.4 oz (87.3 kg)   SpO2 98%   BMI 34.35 kg/m²      1. Have you been to the ER, urgent care clinic since your last visit? Hospitalized since your last visit? No     2. Have you seen or consulted any other health care providers outside of the [address] [place] [address] since your last visit? Include any pap smears or colon screening.  No

## 2021-08-13 NOTE — PATIENT INSTRUCTIONS
Learning About Birth Control: Intrauterine Device (IUD)  What is an intrauterine device (IUD)? The intrauterine device (IUD) is used to prevent pregnancy. It's a small, plastic, T-shaped device. Your doctor places the IUD in your uterus. If you and your doctor discuss it before you give birth, this can be done right after you have your baby. You have a choice between a hormonal IUD and a copper IUD. The hormonal IUD can prevent pregnancy for 3 to 6 years, depending on which IUD is used. But your doctor may talk to you about leaving it in for longer. When you have it, you don't have to do anything else to prevent pregnancy. The copper IUD can prevent pregnancy for 10 years. But your doctor may talk to you about leaving it in for longer. When you have it, you don't have to do anything else to prevent pregnancy. A string tied to the end of the IUD hangs down through the opening of the uterus (called the cervix) into the vagina. You can check that the IUD is in place by feeling for the string. The IUD usually stays in the uterus until your doctor removes it. How well does an IUD for birth control work? In the first year of use:  · When the hormonal IUD is used exactly as directed, fewer than 1 out of 100 women have an unplanned pregnancy. · When the copper IUD is used exactly as directed, fewer than 1 out of 100 women have an unplanned pregnancy. Be sure to tell your doctor about any health problems you have or medicines you take. Your doctor can help you choose the birth control method that's right for you. What are the advantages of an IUD? · An IUD is one of the most effective methods of birth control. · It prevents pregnancy for 3 to 10 years, depending on the type. You don't have to worry about birth control during this time. · It's safe to use while breastfeeding. · IUDs don't contain estrogen.  So you can use an IUD if you don't want to take estrogen or can't take estrogen because you have certain health problems or concerns. · An IUD is convenient. It is always providing birth control. You don't need to remember to take a pill or get a shot. You don't have to interrupt sex to protect against pregnancy. · A hormonal IUD may reduce heavy bleeding and cramping. What are the disadvantages of an IUD? · An IUD doesn't protect against sexually transmitted infections (STIs), such as herpes or HIV/AIDS. If you aren't sure if your sex partner might have an STI, use a condom to protect against disease. · A copper IUD may cause periods with more bleeding and cramping. · You have to see a doctor to have an IUD inserted and removed. Where can you learn more? Go to http://www.gray.com/  Enter B486 in the search box to learn more about \"Learning About Birth Control: Intrauterine Device (IUD). \"  Current as of: October 8, 2020               Content Version: 12.8  © 2006-2021 Healthwise, [facility]. Care instructions adapted under license by [employer] (which disclaims liability or warranty for this information). If you have questions about a medical condition or this instruction, always ask your healthcare professional. [place] [address] any warranty or liability for your use of this information.

## 2021-08-19 ENCOUNTER — TELEPHONE (OUTPATIENT)
Dept: FAMILY MEDICINE CLINIC | Age: 36
End: 2021-08-19

## 2021-08-19 ENCOUNTER — OFFICE VISIT (OUTPATIENT)
Dept: FAMILY MEDICINE CLINIC | Age: 36
End: 2021-08-19
Payer: MEDICAID

## 2021-08-19 VITALS
BODY MASS INDEX: 32.83 KG/M2 | TEMPERATURE: 98.7 F | SYSTOLIC BLOOD PRESSURE: 115 MMHG | OXYGEN SATURATION: 97 % | HEART RATE: 73 BPM | DIASTOLIC BLOOD PRESSURE: 68 MMHG | WEIGHT: 192.3 LBS | HEIGHT: 64 IN

## 2021-08-19 DIAGNOSIS — O03.9 MISCARRIAGE: Primary | ICD-10-CM

## 2021-08-19 PROBLEM — E28.2 PCOS (POLYCYSTIC OVARIAN SYNDROME): Status: ACTIVE | Noted: 2021-08-19

## 2021-08-19 LAB — HCG SERPL-ACNC: 115 MIU/ML (ref 0–6)

## 2021-08-19 PROCEDURE — 99213 OFFICE O/P EST LOW 20 MIN: CPT | Performed by: STUDENT IN AN ORGANIZED HEALTH CARE EDUCATION/TRAINING PROGRAM

## 2021-08-19 NOTE — PROGRESS NOTES
[address] [place] [address]   Office [phone], Fax [phone]      Chief Complaint:     Chief Complaint   Patient presents with    Miscarriage     follow up on this. she had miscarriage 6 weeks ago. Still has cramping. pain currently: 6/10    Labs       Gisela Mary is a 39 y.o. female that presents for: f/u after misscarriage    Assessment/Plan:   I personally reviewed the following Pertinent Labs/Studies:   - Encounter Notes from 8/2021, Labs from 8/2021    1. Miscarriage  -HCG Qt  -Pending results will likely need to f/u again for additional check, pt informed and in agreement  -Order for IUD placed - mirena (progesterone only)    Follow up:      Pending HCG results    Subjective:   HPI:  Gisela Mary is a 39 y.o. female that presents for:    Miscarriage  ~6wk ago  Passed fetal tissue 1wk ago  Still having bleeding although it's starting to space out  Still having a lot of cramping and pain, imelda w/ bowel movements  Pain well cont w/ ibuprofen  She would like to have an IUD placed  Denies fever, chills, purulent d/c    Health Maintenance:  Health Maintenance Due   Topic Date Due    COVID-19 Vaccine (1) Never done    PAP AKA CERVICAL CYTOLOGY  02/10/2020        ROS:   Review of Systems   All other systems reviewed and are negative. Past medical history, social history, and medications personally reviewed. Past Medical History:   Diagnosis Date    Irregular menses     Kidney stones     PCOS (polycystic ovarian syndrome)     Pseudogout 2019        Allergies personally reviewed. No Known Allergies       Objective:   Vitals reviewed. Visit Vitals  /68 (BP 1 Location: Left upper arm, BP Patient Position: Sitting)   Pulse 73   Temp 98.7 °F (37.1 °C) (Oral)   Ht 5' 4\" (1.626 m)   Wt 192 lb 4.8 oz (87.2 kg)   SpO2 97%   BMI 33.01 kg/m²        Physical Exam  Physical Exam     Vitals Reviewed. General AO x 3. No distress. Not diaphoretic. No jaundice.  No cyanosis. No pallor. Neck No thyromegaly present. No JVD. No cervical adenopathy. Cardio Normal rate, regular rhythm. No murmur, rubs, or gallop. Pulmonary Effort normal. No accessory muscle use. No wheezes, rales, or rhonchi. Abdominal Soft. Bowel sounds normal. No distension. Mild tenderness to palpation b/l lower quadrants. Extremities No edema of lower extremities. Neurological CN II-XII grossly intact. No focal deficits. Skin No rash. Pt was discussed with Dr Valentina Hutson (attending physician). I have reviewed pertinent labs results and other data. I have discussed the diagnosis with the patient and the intended plan as seen in the above orders. The patient has received an after-visit summary and questions were answered concerning future plans. I have discussed medication side effects and warnings with the patient as well.     Diane Peng MD  Resident [facility]  08/19/21

## 2021-08-19 NOTE — PATIENT INSTRUCTIONS
Miscarriage: Care Instructions  Overview     For some, the loss of a pregnancy can be very hard. You may wonder why it happened. Miscarriages are common and are not caused by exercise, stress, or sex. Most happen because the fertilized egg in the uterus does not develop normally. There is no treatment that can stop a miscarriage. If you are having a miscarriage, you have several options. As long as you do not have heavy blood loss, fever, weakness, or other signs of infection, you can let a miscarriage follow its own course. This can take several days. If you don't want to wait, you can take medicine to help the pregnancy tissue pass. Or you can have a surgical procedure to remove the tissue. Your body will recover over the next several weeks. Having a miscarriage does not mean you cannot have a normal pregnancy in the future. Follow-up care is a key part of your treatment and safety. Be sure to make and go to all appointments, and call your doctor if you are having problems. It's also a good idea to know your test results and keep a list of the medicines you take. How can you care for yourself at home? · You will probably have some vaginal bleeding for 1 to 2 weeks. It may be similar to or slightly heavier than a normal period. The bleeding should get lighter after a week. Use sanitary pads until you stop bleeding. Using pads makes it easier to monitor your bleeding. · Take an over-the-counter pain medicine, such as acetaminophen (Tylenol), ibuprofen (Advil, Motrin), or naproxen (Aleve) for cramps. Read and follow all instructions on the label. You may have cramps for several days after the miscarriage. · Do not take two or more pain medicines at the same time unless the doctor told you to. Many pain medicines have acetaminophen, which is Tylenol. Too much acetaminophen (Tylenol) can be harmful. · Ask your doctor when it is okay for you to have sex.   · You may return to your normal activities if you feel well enough to do so. · If you would like to try to get pregnant again, it is usually safe whenever you feel ready. Talk with your doctor about any future pregnancy plans. · If you do not want to get pregnant, ask your doctor about birth control. You can get pregnant again before your next period starts if you are not using birth control. · You may be low in iron because of blood loss. Eat a balanced diet that is high in iron and vitamin C. Foods rich in iron include red meat, shellfish, eggs, beans, and leafy green vegetables. Foods high in vitamin C include citrus fruits, tomatoes, and broccoli. Talk to your doctor about whether you need to take iron pills or a multivitamin. · For some, the loss of a pregnancy can be very hard. You may have a range of emotions. If you need help coping, talking to family members, friends, a counselor, or your doctor may help. When should you call for help? Call 911 anytime you think you may need emergency care. For example, call if:    · You passed out (lost consciousness). Call your doctor now or seek immediate medical care if:    · You have severe vaginal bleeding.     · You are dizzy or lightheaded, or you feel like you may faint.     · You have new or worse pain in your belly or pelvis.     · You have a fever.     · You have vaginal discharge that smells bad. Watch closely for changes in your health, and be sure to contact your doctor if:    · You do not get better as expected. Where can you learn more? Go to http://www.gray.com/  Enter E802 in the search box to learn more about \"Miscarriage: Care Instructions. \"  Current as of: October 8, 2020               Content Version: 12.8  © 3832-6869 [employer]. Care instructions adapted under license by [employer] (which disclaims liability or warranty for this information).  If you have questions about a medical condition or this instruction, always ask your healthcare professional. [address] any warranty or liability for your use of this information.

## 2021-08-19 NOTE — TELEPHONE ENCOUNTER
----- Message from Jenny Hicks sent at 8/19/2021  1:16 PM EDT -----  Regarding: Dr. Elizabeth Boone    General Message/Vendor Calls    Caller's first and last name: ThorJOSE ELIAS       Reason for call: Request to resubmit script      Callback required yes/no and why: Yes with questions. Best contact number(s): [phone]      Details to clarify the request: [facility] received a script for the Mirena IUD that did was not signed. Please sign original script and fax to [phone].       Jenny Hicks

## 2021-08-19 NOTE — PROGRESS NOTES
Chief Complaint   Patient presents with    Miscarriage     follow up on this. she had miscarriage 6 weeks ago. Still has cramping. pain currently: 6/10    Labs     Visit Vitals  /68 (BP 1 Location: Left upper arm, BP Patient Position: Sitting)   Pulse 73   Temp 98.7 °F (37.1 °C) (Oral)   Ht 5' 4\" (1.626 m)   Wt 192 lb 4.8 oz (87.2 kg)   SpO2 97%   BMI 33.01 kg/m²     1. Have you been to the ER, urgent care clinic since your last visit? Hospitalized since your last visit? No    2. Have you seen or consulted any other health care providers outside of the [address] [place] [address] since your last visit? Include any pap smears or colon screening.  No

## 2021-08-20 ENCOUNTER — TELEPHONE (OUTPATIENT)
Dept: FAMILY MEDICINE CLINIC | Age: 36
End: 2021-08-20

## 2021-08-20 DIAGNOSIS — O03.9 MISCARRIAGE: Primary | ICD-10-CM

## 2021-08-20 NOTE — TELEPHONE ENCOUNTER
----- Message from Lexie Wilkinson MD sent at 8/20/2021  9:53 AM EDT -----  Regarding: Lab visit  Please call pt to schedule a lab visit either 9/2 or 9/3. Thanks!

## 2021-08-20 NOTE — PROGRESS NOTES
Beta-HCG decreasing 6wk out from miscarriage. Will have pt return to clinic in 2 weeks for repeat HCG in order to trend to <5. Message sent to pt to inform her of these results and the plan going forward.

## 2021-08-23 ENCOUNTER — TELEPHONE (OUTPATIENT)
Dept: FAMILY MEDICINE CLINIC | Age: 36
End: 2021-08-23

## 2021-08-23 NOTE — TELEPHONE ENCOUNTER
----- Message from [place] sent at 8/23/2021  1:25 PM EDT -----  Regarding: Dr. Kerline Lira Message/Vendor Calls    Caller's first and last name:  Ruth black/ [facility]      Reason for call:  Rx was received but was invalid due to missing a provider signature. Please resend new rx for Mirena with provider signature to fax # [phone].        Callback required yes/no and why:  yes       Best contact number(s):  [phone]      Details to clarify the request:  Wilber Marinelli

## 2021-08-25 NOTE — TELEPHONE ENCOUNTER
/Telephone--8/25  Received: Today  [facility], [address] Message/Vendor Calls     Caller's first and last name:Kate. [facility]       Reason for call:Kate advised needing the Mirena prescription faxed over to [phone]       Callback required yes/no and why:yes, to clarify       Best contact number(s):[phone]       Details to clarify the request:Kate advised pt's enrollment forms were faxed but they are needing the prescription now to fill it.        Chapis Herrera

## 2021-09-07 ENCOUNTER — LAB ONLY (OUTPATIENT)
Dept: FAMILY MEDICINE CLINIC | Age: 36
End: 2021-09-07

## 2021-09-07 DIAGNOSIS — O03.9 MISCARRIAGE: ICD-10-CM

## 2021-09-07 LAB — HCG SERPL-ACNC: 2 MIU/ML (ref 0–6)

## 2021-09-14 NOTE — TELEPHONE ENCOUNTER
Yobani/telephone  Received: Marichuy Kessler fp [address] [facility]  Phone Number:  [phone] (Call me)   General Message/Vendor Calls     Caller's first and last name: n/a       Reason for call: Wants to know if IUD has been delivered       Callback required yes/no and why: Yes to confirm and schedule         Best contact number(s): [phone]       Details to clarify the request: [address]

## 2021-09-20 DIAGNOSIS — N92.6 IRREGULAR MENSES: ICD-10-CM

## 2021-09-20 RX ORDER — ACETAMINOPHEN AND CODEINE PHOSPHATE 120; 12 MG/5ML; MG/5ML
1 SOLUTION ORAL DAILY
Qty: 1 DOSE PACK | Refills: 11 | OUTPATIENT
Start: 2021-09-20

## 2021-09-24 ENCOUNTER — OFFICE VISIT (OUTPATIENT)
Dept: FAMILY MEDICINE CLINIC | Age: 36
End: 2021-09-24
Payer: MEDICAID

## 2021-09-24 VITALS
BODY MASS INDEX: 31.76 KG/M2 | OXYGEN SATURATION: 98 % | RESPIRATION RATE: 16 BRPM | HEART RATE: 69 BPM | TEMPERATURE: 98.1 F | SYSTOLIC BLOOD PRESSURE: 104 MMHG | DIASTOLIC BLOOD PRESSURE: 66 MMHG | WEIGHT: 185 LBS

## 2021-09-24 DIAGNOSIS — E28.2 PCOS (POLYCYSTIC OVARIAN SYNDROME): ICD-10-CM

## 2021-09-24 DIAGNOSIS — Z30.430 ENCOUNTER FOR IUD INSERTION: Primary | ICD-10-CM

## 2021-09-24 DIAGNOSIS — N92.6 IRREGULAR MENSES: ICD-10-CM

## 2021-09-24 LAB
HCG URINE, QL. (POC): NEGATIVE
VALID INTERNAL CONTROL?: YES

## 2021-09-24 PROCEDURE — 81025 URINE PREGNANCY TEST: CPT | Performed by: FAMILY MEDICINE

## 2021-09-24 PROCEDURE — 58300 INSERT INTRAUTERINE DEVICE: CPT | Performed by: FAMILY MEDICINE

## 2021-09-24 RX ORDER — LEVONORGESTREL 52 MG/1
1 INTRAUTERINE DEVICE INTRAUTERINE ONCE
Qty: 1 EACH | Refills: 0 | Status: SHIPPED | COMMUNITY
Start: 2021-09-24 | End: 2021-09-24

## 2021-09-24 NOTE — PROGRESS NOTES
I was present for and supervised the entire procedure. See resident note for details. Uncomplicated IUD insertion.

## 2021-09-24 NOTE — PROGRESS NOTES
[facility]  OFFICE PROCEDURE PROGRESS NOTE        Chart reviewed for the following:   Brandon Gonzalez MD, have reviewed the History, Physical and updated the Allergic reactions for Elissa Shah     TIME OUT performed immediately prior to start of procedure:   Brandon Gonzalez MD, have performed the following reviews on [address] [place] [address] prior to the start of the procedure:            * Patient was identified by name and date of birth   * Agreement on procedure being performed was verified  * Risks and Benefits explained to the patient  * Procedure site verified and marked as necessary  * Patient was positioned for comfort  * Consent was signed and verified     Time: 11:05    Date of procedure: 9/24/2021    Procedure performed by: Jamison Lopez DO and Aries Thornton MD    Provider assisted by: Salud Martinez LPN     Patient assisted by: self    How tolerated by patient: well    Post Procedural Pain Scale: none    Comments: none      Pt understands the method and alternatives and agrees to IUD placement. Urine pregnancy test was negative. She is currently taking OCPs consistently. Pt placed in dorsal lithotomy position. A sterile speculum was placed in the patients vagina. The cervix was cleansed with betadine solution. The upper lip of the cervix was grasped with a single toothed tenaculum and gentle traction was applied to align the cervical canal with the uterine cavity. The uterus was then sounded to 7 cm. The flange on the Mirena was then positioned to correlate with the depth of the uterus. The Mirena  device was then inserted into the cervical canal and advanced into the uterus until the flange was 1 cm from the external os. While holding the  steady the arms of the Mirena were released by pulling the slider back until the top of the slider reached the margie.   The  was then gently pushed into the uterine cavity until the flange touched the cervix. The  was then firmly held in position as the Mirena was released by pulling the slider down all the way. The  was then removed from the uterus. The threads were cut to 3-4 cm visible outside the cervix. The instruments were removed from the pts vagina. Hemostasis was assured from the tenaculum sites. The patient tolerated the procedure well.

## 2021-11-19 ENCOUNTER — OFFICE VISIT (OUTPATIENT)
Dept: FAMILY MEDICINE CLINIC | Age: 36
End: 2021-11-19
Payer: MEDICAID

## 2021-11-19 VITALS
DIASTOLIC BLOOD PRESSURE: 67 MMHG | SYSTOLIC BLOOD PRESSURE: 114 MMHG | WEIGHT: 186 LBS | HEART RATE: 67 BPM | TEMPERATURE: 97.5 F | RESPIRATION RATE: 16 BRPM | BODY MASS INDEX: 31.93 KG/M2

## 2021-11-19 DIAGNOSIS — N92.6 IRREGULAR MENSES: ICD-10-CM

## 2021-11-19 DIAGNOSIS — Z30.431 IUD CHECK UP: Primary | ICD-10-CM

## 2021-11-19 PROCEDURE — 99213 OFFICE O/P EST LOW 20 MIN: CPT | Performed by: FAMILY MEDICINE

## 2021-11-19 NOTE — PROGRESS NOTES
Chief Complaint   Patient presents with   Pegge Forget IUD     1. Have you been to the ER, urgent care clinic since your last visit? Hospitalized since your last visit? No    2. Have you seen or consulted any other health care providers outside of the [address] [place] [address] since your last visit? Include any pap smears or colon screening.  No

## 2021-11-19 NOTE — PROGRESS NOTES
Subjective   CC:  Loring Ahumada is a 39 y.o. female who presents for IUD check. Had Mirena IUD placed on 9/24/21 for metrorrhagia. Overall happy with IUD. Has had some intermittent spotting (brownish-red) but no pain/cramping. Review of Systems   Constitutional: Negative for chills, fatigue and fever. HENT: Negative for congestion, rhinorrhea and sore throat. Respiratory: Negative for cough and shortness of breath. Cardiovascular: Negative for chest pain and leg swelling. Gastrointestinal: Negative for abdominal pain, nausea and vomiting. Genitourinary: Positive for vaginal bleeding (occasional spotting). Negative for dysuria, menstrual problem and pelvic pain. Musculoskeletal: Negative for myalgias. Skin: Negative for rash. Neurological: Negative for dizziness and headaches. Past Medical History  Past Medical History:   Diagnosis Date    Irregular menses     Kidney stones     PCOS (polycystic ovarian syndrome)     Pseudogout 2019       Current Medications  Current Outpatient Medications   Medication Sig Dispense Refill    norethindrone (Ortho Micronor) 0.35 mg tab Take 1 Tablet by mouth daily.  1 Dose Pack 11       Allergies  No Known Allergies    Social History   Social History     Socioeconomic History    Marital status:      Spouse name: Not on file    Number of children: Not on file    Years of education: Not on file    Highest education level: Not on file   Occupational History    Not on file   Tobacco Use    Smoking status: Never Smoker    Smokeless tobacco: Never Used   Vaping Use    Vaping Use: Never used   Substance and Sexual Activity    Alcohol use: No    Drug use: No    Sexual activity: Yes     Partners: Male   Other Topics Concern    Not on file   Social History Narrative    Not on file     Social Determinants of Health     Financial Resource Strain:     Difficulty of Paying Living Expenses: Not on file   Food Insecurity:     Worried About Running Out of Food in the Last Year: Not on file    Ran Out of Food in the Last Year: Not on file   Transportation Needs:     Lack of Transportation (Medical): Not on file    Lack of Transportation (Non-Medical):  Not on file   Physical Activity:     Days of Exercise per Week: Not on file    Minutes of Exercise per Session: Not on file   Stress:     Feeling of Stress : Not on file   Social Connections:     Frequency of Communication with Friends and Family: Not on file    Frequency of Social Gatherings with Friends and Family: Not on file    Attends Yazidism Services: Not on file    Active Member of [address] [place] [address] [employer] Benefits or Organizations: Not on file    Attends Club or Organization Meetings: Not on file    Marital Status: Not on file   Intimate Partner Violence:     Fear of Current or Ex-Partner: Not on file    Emotionally Abused: Not on file    Physically Abused: Not on file    Sexually Abused: Not on file   Housing Stability:     Unable to Pay for Housing in the Last Year: Not on file    Number of [place] in the Last Year: Not on file    Unstable Housing in the Last Year: Not on file       Family History  Family History   Problem Relation Age of Onset    Hypertension Mother     Stroke Mother     Other Mother         Sturge Radford Syndrome    Colon Cancer Mother     Cancer Mother         skin    Asthma Father     Elevated Lipids Father     Colon Cancer Father     Other Sister         scolosis    Colon Cancer Paternal Grandmother     Cancer Paternal Grandmother        Objective   Vital Signs  Visit Vitals  /67 (BP 1 Location: Left upper arm, BP Patient Position: Sitting, BP Cuff Size: Adult)   Pulse 67   Temp 97.5 °F (36.4 °C) (Oral)   Resp 16   Wt 186 lb (84.4 kg)   BMI 31.93 kg/m²       Physical Examination  General - awake, alert, cooperative  Pelvic - external genitalia WNL, pink moist vaginal mucosa, IUD string visible at cervical os  Psych - normal mood and affect     Assessment and Priscila He is a 39 y.o. who is here for IUD check. 1. IUD check up  - Follow up for yearly well woman exam. Next Pap due Feb 2022, last Pap Feb 2017 NILM and HPV neg.   - Irregular bleeding and cramping can be normal after an IUD placement, especially in the first few months  - RTC for any concerning symptoms     2. Irregular menses       Patient is counseled to return to the office if symptoms do not improve as expected. Urgent consultation with the nearest Emergency Department is strongly recommended if condition worsens. Patient is counseled to follow up as recommended and to inform the office if any changes in treatment are recommended.       I discussed this patient with Dr. Leon Beavers (Attending Physician)       Signed By:  Danny Gasca DO  Family Medicine Resident

## 2022-02-07 ENCOUNTER — OFFICE VISIT (OUTPATIENT)
Dept: FAMILY MEDICINE CLINIC | Age: 37
End: 2022-02-07

## 2022-02-07 VITALS
SYSTOLIC BLOOD PRESSURE: 107 MMHG | RESPIRATION RATE: 18 BRPM | DIASTOLIC BLOOD PRESSURE: 69 MMHG | WEIGHT: 192.6 LBS | TEMPERATURE: 97.3 F | BODY MASS INDEX: 32.88 KG/M2 | HEART RATE: 69 BPM | OXYGEN SATURATION: 99 % | HEIGHT: 64 IN

## 2022-02-07 DIAGNOSIS — M54.17 LUMBOSACRAL RADICULOPATHY: Primary | ICD-10-CM

## 2022-02-07 PROCEDURE — 99214 OFFICE O/P EST MOD 30 MIN: CPT | Performed by: STUDENT IN AN ORGANIZED HEALTH CARE EDUCATION/TRAINING PROGRAM

## 2022-02-07 RX ORDER — METHYLPREDNISOLONE 4 MG/1
TABLET ORAL
Qty: 1 DOSE PACK | Refills: 0 | Status: SHIPPED | OUTPATIENT
Start: 2022-02-07 | End: 2022-03-24 | Stop reason: ALTCHOICE

## 2022-02-07 NOTE — PATIENT INSTRUCTIONS
Sciatica: Care Instructions  Your Care Instructions     Sciatica (say \"ygh-EU-zv-kuh\") is an irritation of one of the sciatic nerves, which come from the spinal cord in the lower back. The sciatic nerves and their branches extend down through the buttock to the foot. Sciatica can develop when an injured disc in the back irritates or presses against a spinal nerve root. Its main symptom is pain, numbness, or weakness that is often worse in the leg or foot than in the back. Sciatica often will improve and go away with time. Early treatment usually includes medicines and exercises to relieve pain. Follow-up care is a key part of your treatment and safety. Be sure to make and go to all appointments, and call your doctor if you are having problems. It's also a good idea to know your test results and keep a list of the medicines you take. How can you care for yourself at home? · Take pain medicines exactly as directed. ? If the doctor gave you a prescription medicine for pain, take it as prescribed. ? If you are not taking a prescription pain medicine, ask your doctor if you can take an over-the-counter medicine. · Use heat or ice to relieve pain. ? To apply heat, put a warm water bottle, heating pad set on low, or warm cloth on your back. Do not go to sleep with a heating pad on your skin. ? To use ice, put ice or a cold pack on the area for 10 to 20 minutes at a time. Put a thin cloth between the ice and your skin. · Avoid sitting if possible, unless it feels better than standing. · Alternate lying down with short walks. Increase your walking distance as you are able to without making your symptoms worse. · Do not do anything that makes your symptoms worse. When should you call for help? Call 911 anytime you think you may need emergency care. For example, call if:    · You are unable to move a leg at all.    Call your doctor now or seek immediate medical care if:    · You have new or worse symptoms in your legs or buttocks. Symptoms may include:  ? Numbness or tingling. ? Weakness. ? Pain.     · You lose bladder or bowel control. Watch closely for changes in your health, and be sure to contact your doctor if:    · You are not getting better as expected. Where can you learn more? Go to http://www.gray.com/  Enter Z239 in the search box to learn more about \"Sciatica: Care Instructions. \"  Current as of: July 1, 2021               Content Version: 13.0  © 2006-2021 [employer]. Care instructions adapted under license by [employer] (which disclaims liability or warranty for this information). If you have questions about a medical condition or this instruction, always ask your healthcare professional. [address] any warranty or liability for your use of this information.

## 2022-02-07 NOTE — PROGRESS NOTES
Raynald Habermann is a 39 y.o. female    Chief Complaint   Patient presents with    Back Pain     scaitic pain that raidates down her leg. has worsened since last august. numbness all over right foot and toes. feels pins and needles on the bottom of her foot. 1. Have you been to the ER, urgent care clinic since your last visit? Hospitalized since your last visit? No    2. Have you seen or consulted any other health care providers outside of the [address] [place] [address] since your last visit? Include any pap smears or colon screening. No      Vitals:    02/07/22 1537   BP: 107/69   BP 1 Location: Left upper arm   BP Patient Position: Sitting   BP Cuff Size: Adult   Pulse: 69   Temp: 97.3 °F (36.3 °C)   TempSrc: Temporal   Resp: 18   Height: 5' 4\" (1.626 m)   Weight: 192 lb 9.6 oz (87.4 kg)   SpO2: 99%             Health Maintenance Due   Topic Date Due    COVID-19 Vaccine (1) Never done    Flu Vaccine (1) 09/01/2021    Cervical cancer screen  02/10/2022         Medication Reconciliation completed, changes noted.   Please  Update medication list.

## 2022-02-07 NOTE — PROGRESS NOTES
Subjective  Donna Flowers is an 39 y.o. female who presents for evaluation of lower back pain and radiculopathy that started in August, 2021 after the patient had a miscarriage. Patient feels pins and needles, numbness that radiate down to her right foot. Symptoms are exacerbated by head flexion and right leg extension. Lower back pain is 5/10. Uses Advil for relief. She denies any falls or trauma. Denies urinary incontinence, fecal incontinence, personal hx of malignancy, saddle anesthesia,  recent steroid use, weight loss, night pain, chills or fever. Has tried Yoga and stretching for relief but has stopped due worsening symptoms. She has never received tx for her sxs. Review of Systems   Constitutional: Negative for chills, fever, malaise/fatigue and weight loss. Gastrointestinal: Negative for abdominal pain, constipation, diarrhea, heartburn, nausea and vomiting. Musculoskeletal: Positive for back pain. Neurological: Positive for tingling. Past Medical History - reviewed:  Past Medical History:   Diagnosis Date    Irregular menses     Kidney stones     PCOS (polycystic ovarian syndrome)     Pseudogout 2019       Medications - reviewed:   Current Outpatient Medications   Medication Sig    methylPREDNISolone (MEDROL DOSEPACK) 4 mg tablet Take as directed    norethindrone (Ortho Micronor) 0.35 mg tab Take 1 Tablet by mouth daily. (Patient not taking: Reported on 2/7/2022)     No current facility-administered medications for this visit.          Past Surgical History - reviewed:   Past Surgical History:   Procedure Laterality Date    COLONOSCOPY N/A 2/3/2020    COLONOSCOPY performed by Lay Dinh MD at [address] [place] [address] COLONOSCOPY N/A 5/10/2021    COLONOSCOPY performed by Lay Dinh MD at [address] [place] [address] History - reviewed:  Social History     Socioeconomic History    Marital status:      Spouse name: Not on file    Number of children: Not on file    Years of education: Not on file    Highest education level: Not on file   Occupational History    Not on file   Tobacco Use    Smoking status: Never Smoker    Smokeless tobacco: Never Used   Vaping Use    Vaping Use: Never used   Substance and Sexual Activity    Alcohol use: No    Drug use: No    Sexual activity: Yes     Partners: Male   Other Topics Concern    Not on file   Social History Narrative    Not on file     Social Determinants of Health     Financial Resource Strain:     Difficulty of Paying Living Expenses: Not on file   Food Insecurity:     Worried About Running Out of Food in the Last Year: Not on file    Victor Manuel of Food in the Last Year: Not on file   Transportation Needs:     Lack of Transportation (Medical): Not on file    Lack of Transportation (Non-Medical):  Not on file   Physical Activity:     Days of Exercise per Week: Not on file    Minutes of Exercise per Session: Not on file   Stress:     Feeling of Stress : Not on file   Social Connections:     Frequency of Communication with Friends and Family: Not on file    Frequency of Social Gatherings with Friends and Family: Not on file    Attends Oriental orthodox Services: Not on file    Active Member of [address] [place] [address] or Organizations: Not on file    Attends Club or Organization Meetings: Not on file    Marital Status: Not on file   Intimate Partner Violence:     Fear of Current or Ex-Partner: Not on file    Emotionally Abused: Not on file    Physically Abused: Not on file    Sexually Abused: Not on file   Housing Stability:     Unable to Pay for Housing in the Last Year: Not on file    Number of [place] in the Last Year: Not on file    Unstable Housing in the Last Year: Not on file         Family History - reviewed:  Family History   Problem Relation Age of Onset    Hypertension Mother     Stroke Mother     Other Mother         Sturge Radford Syndrome    Colon Cancer Mother     Cancer Mother         skin    Asthma Father    [facility] Elevated Lipids Father     Colon Cancer Father     Other Sister         scolosis    Colon Cancer Paternal Grandmother     Cancer Paternal Grandmother        Allergies - reviewed:   No Known Allergies    Immunizations - reviewed:   Immunization History   Administered Date(s) Administered    Influenza Vaccine (Quad) PF (>6 Mo Flulaval, Fluarix, and >3 Yrs Afluria, Fluzone 10105) 09/05/2017, 10/16/2018, 11/26/2019    Tdap 07/11/2017         Physical Exam  Visit Vitals  /69 (BP 1 Location: Left upper arm, BP Patient Position: Sitting, BP Cuff Size: Adult)   Pulse 69   Temp 97.3 °F (36.3 °C) (Temporal)   Resp 18   Ht 5' 4\" (1.626 m)   Wt 192 lb 9.6 oz (87.4 kg)   SpO2 99%   BMI 33.06 kg/m²       Physical Exam  Constitutional:       General: She is not in acute distress. Appearance: She is obese. She is not ill-appearing. Cardiovascular:      Rate and Rhythm: Normal rate and regular rhythm. Heart sounds: Normal heart sounds. Pulmonary:      Breath sounds: Normal breath sounds. Musculoskeletal:         General: No swelling, tenderness, deformity or signs of injury. Comments: No evidence of hematoma or abscess. No spinal process tenderness. No paraspinal muscle tenderness. Neurological:      General: No focal deficit present. Mental Status: She is alert. Cranial Nerves: Cranial nerves are intact. Sensory: Sensation is intact. Motor: Motor function is intact. No weakness or atrophy. Gait: Gait is intact. Deep Tendon Reflexes:      Reflex Scores:       Patellar reflexes are 2+ on the right side and 2+ on the left side. Comments: + Straight leg test on the right       Spine ROM within normal limits. Assessment/Plan    ICD-10-CM ICD-9-CM    1. Lumbosacral radiculopathy  M54.17 724.4 methylPREDNISolone (MEDROL DOSEPACK) 4 mg tablet   Lumbosacral radiculopathy- symptoms present and progressive since 08/2022. No presence of red flag symptoms.    - Conservative management for now. - Start Medrol pack. - Continue with Ibuprofen 400 q 6 hrs. - Will f/u on 02/21-- if poor improvement will refer to PT and obtain MRI to   evaluate lumbar spine. I have discussed the diagnosis with the patient and the intended plan as seen in the above orders. Patient verbalized understanding of the plan and agrees with the plan. The patient has received an after-visit summary and questions were answered concerning future plans. I have discussed medication side effects and warnings with the patient as well. Informed patient to return to the office if new symptoms arise.     Patient discussed with Dr. Isabelle Mora MD  Family Medicine Resident

## 2022-02-21 ENCOUNTER — OFFICE VISIT (OUTPATIENT)
Dept: FAMILY MEDICINE CLINIC | Age: 37
End: 2022-02-21
Payer: MEDICAID

## 2022-02-21 VITALS
WEIGHT: 191.1 LBS | BODY MASS INDEX: 32.62 KG/M2 | TEMPERATURE: 98.4 F | SYSTOLIC BLOOD PRESSURE: 121 MMHG | OXYGEN SATURATION: 100 % | RESPIRATION RATE: 17 BRPM | HEIGHT: 64 IN | HEART RATE: 59 BPM | DIASTOLIC BLOOD PRESSURE: 84 MMHG

## 2022-02-21 DIAGNOSIS — M54.16 LUMBAR RADICULOPATHY: Primary | ICD-10-CM

## 2022-02-21 DIAGNOSIS — E66.9 OBESITY (BMI 30-39.9): ICD-10-CM

## 2022-02-21 PROCEDURE — 99213 OFFICE O/P EST LOW 20 MIN: CPT | Performed by: STUDENT IN AN ORGANIZED HEALTH CARE EDUCATION/TRAINING PROGRAM

## 2022-02-21 NOTE — PROGRESS NOTES
[address] [facility] [address] [place] [address]   Office [phone], Fax [phone]      Chief Complaint:     Rashid Garza is a 39 y.o. female that presents for:    Chief Complaint   Patient presents with    Hip Pain       Assessment/Plan:   I personally reviewed the following Pertinent Labs/Studies:   - Encounter Notes from 02/07    1. Lumbar radiculopathy  Comments:  Referral to Sports Medicine for CSI  Continue with Home exercises  Continue with NSAIDS  Orders:  -     REFERRAL TO PHYSICAL THERAPY; Future  -     MRI LUMB SPINE WO CONT; Future  2. Obesity (BMI 30-39.9)  -     REFERRAL TO NUTRITION; Future     Follow up: Will follow up with Sports Medicine on  02/23/2022    Subjective:   HPI:  Rashid Garza is a 39 y.o. female that presents for: follow up on lumbar radiculopathy. Patient was initially seen in office on 02/07 and was managed conservatively for lumbar radiculopathy. She completed a Medrol pack taper and Tylenol 1.5 g in the AM and PM. This helps get the edge off. The patient continues endorsing low back pain that radiates down her posterior right leg. She states experiencing some stress incontinence (with coughing and laughter) but denies fecal incontinence, hematoma, cellulitis, weight loss, fever or chills. Her low back pain is a 5/10. Reports that without Tylenol, she will limping. She lives a very active lifestyle and she has been unable do her Yoga or work out. She feels some relief with flexible yoga for sciatic nerve. Health Maintenance:  Health Maintenance Due   Topic Date Due    Flu Vaccine (1) 09/01/2021    COVID-19 Vaccine (3 - Booster for Pfizer series) 02/14/2022    Cervical cancer screen  02/10/2022        ROS:   Review of Systems   All other systems reviewed and are negative. Past medical history, social history, and medications personally reviewed.   Past Medical History:   Diagnosis Date    Irregular menses     Kidney stones     PCOS (polycystic ovarian syndrome)     Pseudogout 2019        Allergies personally reviewed. No Known Allergies     Objective:   Vitals reviewed. Visit Vitals  /84 (BP 1 Location: Left upper arm, BP Patient Position: Sitting, BP Cuff Size: Adult)   Pulse (!) 59   Temp 98.4 °F (36.9 °C) (Oral)   Resp 17   Ht 5' 4\" (1.626 m)   Wt 191 lb 1.6 oz (86.7 kg)   SpO2 100%   BMI 32.80 kg/m²        MSK:    Posture: Normal   Deformity: None    ROM:     Flexion: Normal    Extension: Normal     Lateral bending: Normal      Gait: Normal       Palpation:    L1-L5: No tenderness    Sacrum: No tenderness    Coccyx: No tenderness    Left Paraspinal: No tenderness    Right Paraspinal: No tenderness     Strength (0-5/5)    Hip Flexion:   Left: 5/5  Right: 5/5    Hip Extension:  Left: 5/5  Right: 5/5    Hip Abduction:  Left: 5/5  Right: 5/5    Hip Adduction:  Left: 5/5  Right: 5/5    Knee Extension:  Left: 5/5  Right: 5/5    Knee Flexion:   Left: 5/5  Right: 5/5    Ankle dorsiflexion:  Left: 5/5  Right: 5/5    Ankle plantarflexion:  Left: 5/5  Right: 5/5    Great toe extension:  Left: 5/5  Right: 5/5     Sensation: Intact, no deficits      DTR:    Patella:  Left: +2  Right: +2    Achilles:  Left: +2  Right: +2     Special test:    Straight leg: Left: Negative  Right: Positie    Kathys: Left: Negative  Right: Negative    Piriformis: Left: Negative  Right: Negative    FADIR  Left: Negative  Right: Negative      Pt was discussed with Dr. Renee Johnson  (attending physician). I have reviewed pertinent labs results and other data. I have discussed the diagnosis with the patient and the intended plan as seen in the above orders. The patient has received an after-visit summary and questions were answered concerning future plans. I have discussed medication side effects and warnings with the patient as well.     Ligia Vickers MD  Resident [facility]  02/21/22

## 2022-02-21 NOTE — PROGRESS NOTES
Room:     Identified pt with two pt identifiers(name and ). Reviewed record in preparation for visit and have obtained necessary documentation. All patient medications has been reviewed. Chief Complaint   Patient presents with    Hip Pain       Health Maintenance Due   Topic    Flu Vaccine (1)    COVID-19 Vaccine (3 - Booster for Pfizer series)    Cervical cancer screen        Vitals:    22 0845   BP: 121/84   Pulse: (!) 59   Resp: 17   Temp: 98.4 °F (36.9 °C)   TempSrc: Oral   SpO2: 100%   Weight: 191 lb 1.6 oz (86.7 kg)   Height: 5' 4\" (1.626 m)   PainSc:   0 - No pain       4. Have you been to the ER, urgent care clinic since your last visit? Hospitalized since your last visit? No    5. Have you seen or consulted any other health care providers outside of the [address] [place] [address] since your last visit? Include any pap smears or colon screening. No    6. Would you like to receive your flu shot today? No  If no, would you like information NO    Patient is accompanied by self I have received verbal consent from [address] [place] [address] to discuss any/all medical information while they are present in the room.

## 2022-02-21 NOTE — PATIENT INSTRUCTIONS
Sciatica: Exercises  Introduction  Here are some examples of typical rehabilitation exercises for your condition. Start each exercise slowly. Ease off the exercise if you start to have pain. Your doctor or physical therapist will tell you when you can start these exercises and which ones will work best for you. When you are not being active, find a comfortable position for rest. Some people are comfortable on the floor or a medium-firm bed with a small pillow under their head and another under their knees. Some people prefer to lie on their side with a pillow between their knees. Don't stay in one position for too long. Take short walks (10 to 20 minutes) every 2 to 3 hours. Avoid slopes, hills, and stairs until you feel better. Walk only distances you can manage without pain, especially leg pain. How to do the exercises  Back stretches    1. Get down on your hands and knees on the floor. 2. Relax your head and allow it to droop. Round your back up toward the ceiling until you feel a nice stretch in your upper, middle, and lower back. Hold this stretch for as long as it feels comfortable, or about 15 to 30 seconds. 3. Return to the starting position with a flat back while you are on your hands and knees. 4. Let your back sway by pressing your stomach toward the floor. Lift your buttocks toward the ceiling. 5. Hold this position for 15 to 30 seconds. 6. Repeat 2 to 4 times. Follow-up care is a key part of your treatment and safety. Be sure to make and go to all appointments, and call your doctor if you are having problems. It's also a good idea to know your test results and keep a list of the medicines you take. Where can you learn more? Go to http://www.[employer]/  Enter O418 in the search box to learn more about \"Sciatica: Exercises. \"  Current as of: July 1, 2021               Content Version: 13.0  © 2006-2021 Healthwise[employer] Inc[employer].    Care instructions adapted under license by [address] (which disclaims liability or warranty for this information). If you have questions about a medical condition or this instruction, always ask your healthcare professional. [address] any warranty or liability for your use of this information.

## 2022-02-23 ENCOUNTER — OFFICE VISIT (OUTPATIENT)
Dept: FAMILY MEDICINE CLINIC | Age: 37
End: 2022-02-23
Payer: MEDICAID

## 2022-02-23 VITALS
DIASTOLIC BLOOD PRESSURE: 68 MMHG | HEART RATE: 70 BPM | SYSTOLIC BLOOD PRESSURE: 109 MMHG | WEIGHT: 187.6 LBS | RESPIRATION RATE: 16 BRPM | OXYGEN SATURATION: 99 % | TEMPERATURE: 98.5 F | BODY MASS INDEX: 32.03 KG/M2 | HEIGHT: 64 IN

## 2022-02-23 DIAGNOSIS — M54.16 LUMBAR RADICULAR PAIN: Primary | ICD-10-CM

## 2022-02-23 DIAGNOSIS — G57.01 PIRIFORMIS SYNDROME OF RIGHT SIDE: ICD-10-CM

## 2022-02-23 PROCEDURE — 99213 OFFICE O/P EST LOW 20 MIN: CPT | Performed by: STUDENT IN AN ORGANIZED HEALTH CARE EDUCATION/TRAINING PROGRAM

## 2022-02-23 RX ORDER — NAPROXEN 500 MG/1
500 TABLET ORAL 2 TIMES DAILY WITH MEALS
Qty: 21 TABLET | Refills: 0 | Status: SHIPPED | OUTPATIENT
Start: 2022-02-23

## 2022-02-23 NOTE — PROGRESS NOTES
[address] [facility]      Chief Complaint:   Chief Complaint   Patient presents with    Back Pain     back pain since summer 2021, rt side lower back radiates to rt leg, no releif with recent steroid use       Subjective:   History: This patient is a 39 y.o. female with a chief complaint of back pain. Duration: since 8/2021    [facility] of Injury: started after she had miscarriage  Location: right back/buttock  Radiation right leg to foot   Timing: intermittent   Description: electirc, burning, pins and needles  Exacerbating Factors: forward flexion, sitting down. Feels pain in buttock when she is sitting  Alleviating Factors: Rest, off loading right buttock    Patient denies any night pain, numbness, weakness, or bowel/bladder dysfunction. Prior Treatments:  Active Physical Therapy:  No  Attempted Modalities:  none tried  Injections:  no prior injections  Surgeries:  no prior surgery to the affected area  Medications: advil  Prior Imaging:  has not had prior imaging of the area . Is pending MRI     The patient has no other complaints at this time. Review of Systems:  General/Constitutional:  No fever, chills, sweats, fatigue, night sweats, weakness, weight loss or weight gain   Head: No headache, no trauma   Neck: No swelling, masses, stiffness, pain, or limited movement   Cardiac: No chest pain   Respiratory: No cough, shortness of breath, or dyspnea on exertion   GI: No incontinence. No nausea/vomiting, diarrhea, abdominal pain, bloody or dark stools  : No incontinence. No change in urinary habits. Peripheral Vascular: No edema, coldness, numbness, discoloration, pain, or paresthesias   Musculoskeletal: As per HPI  Neurological: No saddle distribution paresthesia. No siatic pain. No loss of consciousness, dizziness, seizures, dysarthria, cognitive changes, problems with balance, or unilateral weakness.     Past Medical History:   Diagnosis Date    Irregular menses     Kidney stones     PCOS (polycystic ovarian syndrome)     Pseudogout 2019     Family History   Problem Relation Age of Onset    Hypertension Mother     Stroke Mother     Other Mother         Sturge Radford Syndrome    Colon Cancer Mother     Cancer Mother         skin    Asthma Father     Elevated Lipids Father     Colon Cancer Father     Other Sister         scolosis    Colon Cancer Paternal Grandmother     Cancer Paternal Grandmother      Current Outpatient Medications   Medication Sig Dispense Refill    naproxen (NAPROSYN) 500 mg tablet Take 1 Tablet by mouth two (2) times daily (with meals). 21 Tablet 0    methylPREDNISolone (MEDROL DOSEPACK) 4 mg tablet Take as directed (Patient not taking: Reported on 2/23/2022) 1 Dose Pack 0     No Known Allergies  Social History     Socioeconomic History    Marital status:      Spouse name: Not on file    Number of children: Not on file    Years of education: Not on file    Highest education level: Not on file   Occupational History    Not on file   Tobacco Use    Smoking status: Never Smoker    Smokeless tobacco: Never Used   Vaping Use    Vaping Use: Never used   Substance and Sexual Activity    Alcohol use: No    Drug use: No    Sexual activity: Yes     Partners: Male   Other Topics Concern    Not on file   Social History Narrative    Not on file     Social Determinants of Health     Financial Resource Strain:     Difficulty of Paying Living Expenses: Not on file   Food Insecurity:     Worried About Running Out of Food in the Last Year: Not on file    Victor Manuel of Food in the Last Year: Not on file   Transportation Needs:     Lack of Transportation (Medical): Not on file    Lack of Transportation (Non-Medical):  Not on file   Physical Activity:     Days of Exercise per Week: Not on file    Minutes of Exercise per Session: Not on file   Stress:     Feeling of Stress : Not on file Social Connections:     Frequency of Communication with Friends and Family: Not on file    Frequency of Social Gatherings with Friends and Family: Not on file    Attends Protestant Services: Not on file    Active Member of Clubs or Organizations: Not on file    Attends Club or Organization Meetings: Not on file    Marital Status: Not on file   Intimate Partner Violence:     Fear of Current or Ex-Partner: Not on file    Emotionally Abused: Not on file    Physically Abused: Not on file    Sexually Abused: Not on file   Housing Stability:     Unable to Pay for Housing in the Last Year: Not on file    Number of [place] in the Last Year: Not on file    Unstable Housing in the Last Year: Not on file       Objective:     Visit Vitals  /68   Pulse 70   Temp 98.5 °F (36.9 °C) (Oral)   Resp 16   Ht 5' 4\" (1.626 m)   Wt 187 lb 9.6 oz (85.1 kg)   SpO2 99%   BMI 32.20 kg/m²       General: Alert and oriented and in no acute distress. Responds to all questions appropriately. LUNGS: Respirations unlabored. Skin: No obvious rash.   MSK:    Posture: Normal   Deformity: None    ROM:     Flexion: Normal    Extension: Normal     Lateral bending: Normal      Gait: Normal       Palpation:    L1-L5: No tenderness    Sacrum: No tenderness    Coccyx: No tenderness    Left Paraspinal: No tenderness    Right Paraspinal: mild pain tenderness   right piriformis: pain with radiating symptoms to the right foot     Strength (0-5/5)    Hip Flexion:   Left: 5/5  Right: 5/5    Hip Extension:  Left: 5/5  Right: 5/5    Hip Abduction:  Left: 5/5  Right: 5/5    Hip Adduction:  Left: 5/5  Right: 5/5    Knee Extension:  Left: 5/5  Right: 5/5    Knee Flexion:   Left: 5/5  Right: 5/5    Ankle dorsiflexion:  Left: 5/5  Right: 5/5    Ankle plantarflexion:  Left: 5/5  Right: 5/5    Great toe extension:  Left: 5/5  Right: 5/5     Sensation: Intact, no deficits      DTR:    Patella:  Left: +2  Right: +2    Achilles:  Left: +2  Right: +2     Special test:    Straight leg: Left: Negative  Right: positive    Kathys: Left: Negative  Right: Negative    Piriformis: Left: Negative  Right: Negative               Assessment:       ICD-10-CM ICD-9-CM    1. Lumbar radicular pain  M54.16 724.4    2. Piriformis syndrome of right side  G57.01 355.0          Plan:   - patient reports that pain that bothers her is mostly radicular symptoms into the leg that starts in the buttock on right rather than the lumbar spine.   - she has MRI ordered. Is pending to start physical therapy. Physical therapy script was modified today  - can try short course naproxen 500 bid with food.  Pain only slightly improved with medrol dose alba that she tried  - patient will follow up for right USG obturator internus injection

## 2022-02-23 NOTE — PROGRESS NOTES
Identified pt with two pt identifiers(name and ). Reviewed record in preparation for visit and have obtained necessary documentation. Chief Complaint   Patient presents with    Back Pain     back pain since summer 2021, rt side lower back radiates to rt leg, no releif with recent steroid use        Vitals:    22 1615   BP: 109/68   Pulse: 70   Resp: 16   Temp: 98.5 °F (36.9 °C)   TempSrc: Oral   SpO2: 99%   Weight: 187 lb 9.6 oz (85.1 kg)   Height: 5' 4\" (1.626 m)   PainSc:   6   PainLoc: Back       Health Maintenance Due   Topic    Flu Vaccine (1)    COVID-19 Vaccine (3 - Booster for [employer] series)    Cervical cancer screen        Coordination of Care Questionnaire:  :   1) Have you been to an emergency room, urgent care, or hospitalized since your last visit? If yes, where when, and reason for visit? no       2. Have seen or consulted any other health care provider since your last visit? If yes, where when, and reason for visit? NO      Patient is accompanied by self I have received verbal consent from [address] [place] [address] to discuss any/all medical information while they are present in the room.

## 2022-03-04 ENCOUNTER — HOSPITAL ENCOUNTER (OUTPATIENT)
Dept: MRI IMAGING | Age: 37
Discharge: HOME | End: 2022-03-04
Attending: STUDENT IN AN ORGANIZED HEALTH CARE EDUCATION/TRAINING PROGRAM
Payer: MEDICAID

## 2022-03-04 DIAGNOSIS — M54.16 LUMBAR RADICULOPATHY: ICD-10-CM

## 2022-03-04 PROCEDURE — 72148 MRI LUMBAR SPINE W/O DYE: CPT

## 2022-03-17 NOTE — PROGRESS NOTES
IMPRESSION  Disc degenerative change at L4-L5.     Moderate right lateral recess stenosis at L4-5 with effacement of nerve roots  extending to the right L5-S1 foramen. Follow up with Sports Medicine.

## 2022-03-19 PROBLEM — Z30.09 FAMILY PLANNING ADVICE: Status: ACTIVE | Noted: 2017-11-10

## 2022-03-20 PROBLEM — E28.2 PCOS (POLYCYSTIC OVARIAN SYNDROME): Status: ACTIVE | Noted: 2021-08-19

## 2022-03-24 ENCOUNTER — OFFICE VISIT (OUTPATIENT)
Dept: FAMILY MEDICINE CLINIC | Age: 37
End: 2022-03-24
Payer: MEDICAID

## 2022-03-24 VITALS
HEIGHT: 64 IN | TEMPERATURE: 98.2 F | DIASTOLIC BLOOD PRESSURE: 68 MMHG | OXYGEN SATURATION: 98 % | SYSTOLIC BLOOD PRESSURE: 110 MMHG | HEART RATE: 60 BPM | BODY MASS INDEX: 32.1 KG/M2 | WEIGHT: 188 LBS | RESPIRATION RATE: 18 BRPM

## 2022-03-24 DIAGNOSIS — D64.9 ANEMIA, UNSPECIFIED TYPE: Primary | ICD-10-CM

## 2022-03-24 DIAGNOSIS — D64.9 ANEMIA, UNSPECIFIED TYPE: ICD-10-CM

## 2022-03-24 DIAGNOSIS — G57.01 PIRIFORMIS SYNDROME OF RIGHT SIDE: ICD-10-CM

## 2022-03-24 DIAGNOSIS — G62.9 NEUROPATHY: ICD-10-CM

## 2022-03-24 PROCEDURE — 20552 NJX 1/MLT TRIGGER POINT 1/2: CPT | Performed by: FAMILY MEDICINE

## 2022-03-24 PROCEDURE — 99214 OFFICE O/P EST MOD 30 MIN: CPT | Performed by: FAMILY MEDICINE

## 2022-03-24 PROCEDURE — 76942 ECHO GUIDE FOR BIOPSY: CPT | Performed by: FAMILY MEDICINE

## 2022-03-24 RX ORDER — TRIAMCINOLONE ACETONIDE 40 MG/ML
40 INJECTION, SUSPENSION INTRA-ARTICULAR; INTRAMUSCULAR ONCE
Qty: 1 ML | Refills: 0
Start: 2022-03-24 | End: 2022-03-24

## 2022-03-24 RX ORDER — LIDOCAINE HYDROCHLORIDE 10 MG/ML
2 INJECTION INFILTRATION; PERINEURAL ONCE
Qty: 2 ML | Refills: 0
Start: 2022-03-24 | End: 2022-03-24

## 2022-03-24 NOTE — PROGRESS NOTES
Identified Patient with two Patient identifiers (Name and ). Two Patient Identifiers confirmed. Reviewed record in preparation for visit and have obtained necessary documentation. Chief Complaint   Patient presents with    LOW BACK PAIN     low back pain and right leg pain, follow up from MRI. Possible injections. Visit Vitals  /68 (BP 1 Location: Right arm, BP Patient Position: Sitting, BP Cuff Size: Adult)   Pulse 60   Temp 98.2 °F (36.8 °C) (Oral)   Resp 18   Ht 5' 4\" (1.626 m)   Wt 188 lb (85.3 kg)   SpO2 98%   BMI 32.27 kg/m²       1. Have you been to the ER, urgent care clinic since your last visit? Hospitalized since your last visit? No    2. Have you seen or consulted any other health care providers outside of the [address] [place] [address] since your last visit? Include any pap smears or colon screening.  No

## 2022-03-24 NOTE — PROGRESS NOTES
History of Present Illness     Chief Complaint   Patient presents with    LOW BACK PAIN     low back pain and right leg pain, follow up from MRI. Possible injections. Patient Identification  Pantera Chiang is a 39 y.o. female complains of right radicular back pain. Patient states that since her last visit, her pain has somewhat improved. She has started physical therapy with some improvement. Naproxen offered some relief but she completed it. She is now taking tylenol and motrin. Pain still radiates from the right buttock to the right postero-lateral foot. No numbness but does has paresthesia. Patient noted to be anemic at last CBC. Past Medical History:   Diagnosis Date    Irregular menses     Kidney stones     PCOS (polycystic ovarian syndrome)     Pseudogout 2019     Family History   Problem Relation Age of Onset    Hypertension Mother     Stroke Mother     Other Mother         Sturge Radford Syndrome    Colon Cancer Mother     Cancer Mother         skin    Asthma Father     Elevated Lipids Father     Colon Cancer Father     Other Sister         scolosis    Colon Cancer Paternal Grandmother     Cancer Paternal Grandmother      Current Outpatient Medications   Medication Sig Dispense Refill    triamcinolone acetonide (Kenalog) 40 mg/mL injection 1 mL by IntraBURSal route once for 1 dose. 1 mL 0    lidocaine (XYLOCAINE) 10 mg/mL (1 %) injection 2 mL by IntraBURSal route once for 1 dose. 2 mL 0    naproxen (NAPROSYN) 500 mg tablet Take 1 Tablet by mouth two (2) times daily (with meals). 21 Tablet 0     No Known Allergies    Review of Systems  A comprehensive review of systems was negative except for that written in the HPI.      Physical Exam     Visit Vitals  /68 (BP 1 Location: Right arm, BP Patient Position: Sitting, BP Cuff Size: Adult)   Pulse 60   Temp 98.2 °F (36.8 °C) (Oral)   Resp 18   Ht 5' 4\" (1.626 m)   Wt 188 lb (85.3 kg)   SpO2 98%   BMI 32.27 kg/m²       General: Alert and oriented and in no acute distress. Responds to all questions appropriately. LUNGS: Respirations unlabored. Skin: No obvious rash.   MSK:   Posture: Normal  Deformity: None   ROM:               Flexion: Normal              Extension: Normal               Lateral bending: Normal      Gait: Normal       Palpation:              L1-L5: No tenderness              Sacrum: No tenderness              Coccyx: No tenderness              Left Paraspinal: No tenderness              Right Paraspinal: mild pain tenderness   right piriformis: pain with radiating symptoms to the right foot     Strength (0-5/5)              Hip Flexion:                 Left: 5/5                       Right: 5/5              Hip Extension:             Left: 5/5                       Right: 5/5              Hip Abduction:             Left: 5/5                       Right: 5/5              Hip Adduction:            Left: 5/5                       Right: 5/5              Knee Extension:          Left: 5/5                       Right: 5/5              Knee Flexion:              Left: 5/5                       Right: 5/5              Ankle dorsiflexion:       Left: 5/5                       Right: 5/5              Ankle plantarflexion:   Left: 5/5                       Right: 5/5              Great toe extension:   Left: 5/5                       Right: 5/5     Sensation: Intact, no deficits      DTR:              Patella:            Left: +2                        Right: +2              Achilles:           Left: +2                        Right: +2     Special test:              Straight leg:     Left: Negative              Right: negative              Kathys:           Left: Negative              Right: Negative              Piriformis:        Left: Negative              Right: Negative    [facility]  OFFICE PROCEDURE PROGRESS NOTE        Chart reviewed for the following:   Leida Alexander MD, have reviewed the History, Physical and updated the Allergic reactions for Elissa Shah     TIME OUT performed immediately prior to start of procedure:   I, Sukhwinder Preston MD, have performed the following reviews on [address] [place] [address] prior to the start of the procedure:            * Patient was identified by name and date of birth   * Agreement on procedure being performed was verified  * Risks and Benefits explained to the patient  * Procedure site verified and marked as necessary  * Patient was positioned for comfort  * Consent was signed and verified     Time: 3:00pm      Date of procedure: 3/24/2022    Procedure performed by:  Sukhwinder Preston MD    Provider assisted by: Caio Maciel DO    Patient assisted by: self    How tolerated by patient: tolerated the procedure well with no complications    Post Procedural Pain Scale: 0 - No Hurt    Ultrasound Guided Right Obturator Internus Joint Injection    Treatment options discussed with patient at length, including risks, benefits, alternatives, and the nature of any potential procedures for the problem. Using the Goji system, I performed a MSK US guided aspiration and/or injection of the above target via an in-plane approach after Chlorhexidine prep and needle localization with the linear Probe using a /22G needle injecting 40 mg Kenalog and 3 ml Lidocaine 1% w/o Epi. Pt reported near 100 percent relief of symptoms after injection. The injection was performed for diagnostic and prognostic purposes. Ultrasound Performed and Interpreted by:  Rosangela Hamilton MD, [facility]      Assessment:    ICD-10-CM ICD-9-CM    1. Anemia, unspecified type  D64.9 285.9 CBC W/O DIFF      CANCELED: CBC W/O DIFF   2. Piriformis syndrome of right side  G57.01 355.0 TRIAMCINOLONE ACETONIDE INJ      triamcinolone acetonide (Kenalog) 40 mg/mL injection      lidocaine (XYLOCAINE) 10 mg/mL (1 %) injection      AMB POC US, SONO GUIDE NEEDLE      INJECT TRIGGER POINT, 1 OR 2   3.  Neuropathy G62.9 355.9        Plan:  -Check CBC to ensure anemia has resolve and not contributing to neuropathy.    -Pain and paraesthesia improved after injection    After Care Instructions: The patient is asked to continue to rest the joint for a few more days before resuming regular activities. It may be more painful for the first 1-2 days. Watch for fever, or increased swelling or persistent pain in the joint. Call or return to clinic prn if such symptoms occur or there is failure to improve as anticipated. Follow-up and Dispositions    · Return in about 1 month (around 4/24/2022) for Annual Wellness. Patient encounter was >30 minutes was spent of total time spent on the date of the encounter: preparing to see the patient(reviewing prior notes and tests), obtaining and/or reviewing separately obtained history, performing a medially appropriate examination and/or evaluation, counseling and educating the patient, ordering medications, test procedures, documenting clinical information in the electronic or other health record, independently interpreting results(not separately reported).

## 2022-03-25 LAB
ERYTHROCYTE [DISTWIDTH] IN BLOOD BY AUTOMATED COUNT: 13.6 % (ref 11.5–14.5)
HCT VFR BLD AUTO: 41.8 % (ref 35–47)
HGB BLD-MCNC: 14 G/DL (ref 11.5–16)
MCH RBC QN AUTO: 31.7 PG (ref 26–34)
MCHC RBC AUTO-ENTMCNC: 33.5 G/DL (ref 30–36.5)
MCV RBC AUTO: 94.6 FL (ref 80–99)
NRBC # BLD: 0 K/UL (ref 0–0.01)
NRBC BLD-RTO: 0 PER 100 WBC
PLATELET # BLD AUTO: 273 K/UL (ref 150–400)
PMV BLD AUTO: 11 FL (ref 8.9–12.9)
RBC # BLD AUTO: 4.42 M/UL (ref 3.8–5.2)
WBC # BLD AUTO: 8.7 K/UL (ref 3.6–11)

## 2022-03-25 NOTE — PROGRESS NOTES
Good news, all your blood counts have returned to normal.  No need for further workup with B12 or other tests to look for secondary causes of neuropathy. I am glad the injection helped yesterday and the steroids should kick in in about 3-5 days, so lets give it some time and see how long this lasts.

## 2023-01-25 ENCOUNTER — APPOINTMENT (OUTPATIENT)
Dept: CT IMAGING | Age: 38
End: 2023-01-25
Attending: EMERGENCY MEDICINE
Payer: MEDICAID

## 2023-01-25 ENCOUNTER — HOSPITAL ENCOUNTER (EMERGENCY)
Age: 38
Discharge: HOME | End: 2023-01-26
Attending: EMERGENCY MEDICINE
Payer: MEDICAID

## 2023-01-25 VITALS
SYSTOLIC BLOOD PRESSURE: 129 MMHG | WEIGHT: 185 LBS | RESPIRATION RATE: 16 BRPM | HEART RATE: 80 BPM | BODY MASS INDEX: 31.58 KG/M2 | OXYGEN SATURATION: 99 % | TEMPERATURE: 98.4 F | DIASTOLIC BLOOD PRESSURE: 94 MMHG | HEIGHT: 64 IN

## 2023-01-25 DIAGNOSIS — K60.2 ANAL FISSURE: ICD-10-CM

## 2023-01-25 DIAGNOSIS — R10.32 ABDOMINAL PAIN, LLQ (LEFT LOWER QUADRANT): Primary | ICD-10-CM

## 2023-01-25 LAB
ALBUMIN SERPL-MCNC: 4 G/DL (ref 3.5–5)
ALBUMIN/GLOB SERPL: 1.1 (ref 1.1–2.2)
ALP SERPL-CCNC: 61 U/L (ref 45–117)
ALT SERPL-CCNC: 34 U/L (ref 12–78)
ANION GAP SERPL CALC-SCNC: 7 MMOL/L (ref 5–15)
APPEARANCE UR: CLEAR
AST SERPL-CCNC: 12 U/L (ref 15–37)
BASOPHILS # BLD: 0.1 K/UL (ref 0–0.1)
BASOPHILS NFR BLD: 1 % (ref 0–1)
BILIRUB SERPL-MCNC: 0.7 MG/DL (ref 0.2–1)
BILIRUB UR QL: NEGATIVE
BUN SERPL-MCNC: 21 MG/DL (ref 6–20)
BUN/CREAT SERPL: 27 (ref 12–20)
CALCIUM SERPL-MCNC: 9.2 MG/DL (ref 8.5–10.1)
CHLORIDE SERPL-SCNC: 105 MMOL/L (ref 97–108)
CO2 SERPL-SCNC: 28 MMOL/L (ref 21–32)
COLOR UR: ABNORMAL
COMMENT, HOLDF: NORMAL
CREAT SERPL-MCNC: 0.77 MG/DL (ref 0.55–1.02)
DIFFERENTIAL METHOD BLD: NORMAL
EOSINOPHIL # BLD: 0.2 K/UL (ref 0–0.4)
EOSINOPHIL NFR BLD: 2 % (ref 0–7)
ERYTHROCYTE [DISTWIDTH] IN BLOOD BY AUTOMATED COUNT: 12.9 % (ref 11.5–14.5)
GLOBULIN SER CALC-MCNC: 3.8 G/DL (ref 2–4)
GLUCOSE SERPL-MCNC: 87 MG/DL (ref 65–100)
GLUCOSE UR STRIP.AUTO-MCNC: NEGATIVE MG/DL
HCG UR QL: NEGATIVE
HCT VFR BLD AUTO: 41.1 % (ref 35–47)
HGB BLD-MCNC: 14.1 G/DL (ref 11.5–16)
HGB UR QL STRIP: NEGATIVE
IMM GRANULOCYTES # BLD AUTO: 0 K/UL (ref 0–0.04)
IMM GRANULOCYTES NFR BLD AUTO: 0 % (ref 0–0.5)
KETONES UR QL STRIP.AUTO: ABNORMAL MG/DL
LEUKOCYTE ESTERASE UR QL STRIP.AUTO: NEGATIVE
LIPASE SERPL-CCNC: 148 U/L (ref 73–393)
LYMPHOCYTES # BLD: 2 K/UL (ref 0.8–3.5)
LYMPHOCYTES NFR BLD: 20 % (ref 12–49)
MCH RBC QN AUTO: 31.1 PG (ref 26–34)
MCHC RBC AUTO-ENTMCNC: 34.3 G/DL (ref 30–36.5)
MCV RBC AUTO: 90.5 FL (ref 80–99)
MONOCYTES # BLD: 0.8 K/UL (ref 0–1)
MONOCYTES NFR BLD: 8 % (ref 5–13)
NEUTS SEG # BLD: 6.8 K/UL (ref 1.8–8)
NEUTS SEG NFR BLD: 69 % (ref 32–75)
NITRITE UR QL STRIP.AUTO: NEGATIVE
NRBC # BLD: 0 K/UL (ref 0–0.01)
NRBC BLD-RTO: 0 PER 100 WBC
PH UR STRIP: 6.5 (ref 5–8)
PLATELET # BLD AUTO: 277 K/UL (ref 150–400)
PMV BLD AUTO: 10.8 FL (ref 8.9–12.9)
POTASSIUM SERPL-SCNC: 3.7 MMOL/L (ref 3.5–5.1)
PROT SERPL-MCNC: 7.8 G/DL (ref 6.4–8.2)
PROT UR STRIP-MCNC: NEGATIVE MG/DL
RBC # BLD AUTO: 4.54 M/UL (ref 3.8–5.2)
SAMPLES BEING HELD,HOLD: NORMAL
SODIUM SERPL-SCNC: 140 MMOL/L (ref 136–145)
SP GR UR REFRACTOMETRY: <1.005 (ref 1–1.03)
UR CULT HOLD, URHOLD: NORMAL
UROBILINOGEN UR QL STRIP.AUTO: 1 EU/DL (ref 0.2–1)
WBC # BLD AUTO: 9.9 K/UL (ref 3.6–11)

## 2023-01-25 PROCEDURE — 85025 COMPLETE CBC W/AUTO DIFF WBC: CPT

## 2023-01-25 PROCEDURE — 81025 URINE PREGNANCY TEST: CPT

## 2023-01-25 PROCEDURE — 74177 CT ABD & PELVIS W/CONTRAST: CPT

## 2023-01-25 PROCEDURE — 83690 ASSAY OF LIPASE: CPT

## 2023-01-25 PROCEDURE — 74011000636 HC RX REV CODE- 636: Performed by: RADIOLOGY

## 2023-01-25 PROCEDURE — 81003 URINALYSIS AUTO W/O SCOPE: CPT

## 2023-01-25 PROCEDURE — 36415 COLL VENOUS BLD VENIPUNCTURE: CPT

## 2023-01-25 PROCEDURE — 80053 COMPREHEN METABOLIC PANEL: CPT

## 2023-01-25 PROCEDURE — 99285 EMERGENCY DEPT VISIT HI MDM: CPT

## 2023-01-25 RX ADMIN — IOPAMIDOL 100 ML: 755 INJECTION, SOLUTION INTRAVENOUS at 23:27

## 2023-01-26 NOTE — ED PROVIDER NOTES
15-year-old female with small amount of rectal bleeding and some left lower quadrant abdominal pain for the past 2 days. No fever or vomiting. No diarrhea. Patient has no history of surgeries on her abdomen is otherwise stable in the ER. The history is provided by the patient. Abdominal Pain   This is a new problem. The current episode started more than 2 days ago. The problem occurs constantly. The problem has not changed since onset. The pain is located in the LLQ. The quality of the pain is aching and cramping. The pain is moderate. Pertinent negatives include no fever, no belching, no diarrhea, no flatus, no hematochezia, no melena, no nausea, no vomiting, no constipation, no dysuria, no frequency, no hematuria, no headaches, no arthralgias, no myalgias, no chest pain, no testicular pain and no back pain. Nothing worsens the pain. The pain is relieved by Nothing. Past workup includes no CT scan, no ultrasound, no surgery, no barium enema. Her past medical history does not include PUD, gallstones, GERD, ulcerative colitis, Crohn's disease, irritable bowel syndrome, cancer, ectopic pregnancy, ovarian cysts, diverticulitis, atrial fibrillation, kidney stones or small bowel obstruction. Rectal Bleeding  This is a new problem. The current episode started more than 2 days ago. The problem has been gradually improving. Associated symptoms include abdominal pain. Pertinent negatives include no chest pain, no headaches and no shortness of breath. Nothing aggravates the symptoms. Nothing relieves the symptoms. She has tried nothing for the symptoms.       Past Medical History:   Diagnosis Date    Irregular menses     Kidney stones     PCOS (polycystic ovarian syndrome)     Pseudogout 2019       Past Surgical History:   Procedure Laterality Date    COLONOSCOPY N/A 2/3/2020    COLONOSCOPY performed by Emir Harley MD at [facility]    COLONOSCOPY N/A 5/10/2021    COLONOSCOPY performed by Emir Harley MD at [facility] ENDOSCOPY         Family History:   Problem Relation Age of Onset    Hypertension Mother     Stroke Mother     Other Mother         Germaine Rower Syndrome    Colon Cancer Mother     Cancer Mother         skin    Asthma Father     Elevated Lipids Father     Colon Cancer Father     Other Sister         scolosis    Colon Cancer Paternal Grandmother     Cancer Paternal Grandmother        Social History     Socioeconomic History    Marital status:      Spouse name: Not on file    Number of children: Not on file    Years of education: Not on file    Highest education level: Not on file   Occupational History    Not on file   Tobacco Use    Smoking status: Never    Smokeless tobacco: Never   Vaping Use    Vaping Use: Never used   Substance and Sexual Activity    Alcohol use: No    Drug use: No    Sexual activity: Yes     Partners: Male   Other Topics Concern    Not on file   Social History Narrative    Not on file     Social Determinants of Health     Financial Resource Strain: Not on file   Food Insecurity: Not on file   Transportation Needs: Not on file   Physical Activity: Not on file   Stress: Not on file   Social Connections: Not on file   Intimate Partner Violence: Not on file   Housing Stability: Not on file         ALLERGIES: Patient has no known allergies. Review of Systems   Constitutional:  Negative for chills and fever. HENT:  Negative for congestion, rhinorrhea, sneezing and sore throat. Respiratory:  Negative for shortness of breath. Cardiovascular:  Negative for chest pain. Gastrointestinal:  Positive for abdominal pain and anal bleeding. Negative for constipation, diarrhea, flatus, hematochezia, melena, nausea and vomiting. Genitourinary:  Negative for dysuria, frequency, hematuria and testicular pain. Musculoskeletal:  Negative for arthralgias, back pain, myalgias and neck stiffness. Skin:  Negative for rash. Neurological:  Negative for dizziness, weakness and headaches.    All other systems reviewed and are negative. Vitals:    01/25/23 2155   BP: (!) 129/94   Pulse: 80   Resp: 16   Temp: 98.4 °F (36.9 °C)   SpO2: 99%   Weight: 83.9 kg (185 lb)   Height: 5' 4\" (1.626 m)            Physical Exam  Vitals and nursing note reviewed. Constitutional:       Appearance: Normal appearance. She is well-developed. HENT:      Head: Normocephalic and atraumatic. Eyes:      Conjunctiva/sclera: Conjunctivae normal.   Cardiovascular:      Rate and Rhythm: Normal rate and regular rhythm. Pulses: Normal pulses. Heart sounds: Normal heart sounds, S1 normal and S2 normal.   Pulmonary:      Effort: Pulmonary effort is normal. No respiratory distress. Breath sounds: Normal breath sounds. No wheezing. Abdominal:      General: Bowel sounds are normal. There is no distension. Palpations: Abdomen is soft. Tenderness: There is no abdominal tenderness. There is no rebound. Genitourinary:     Comments: Anal fissure is noted. No active bleeding. Musculoskeletal:         General: Normal range of motion. Cervical back: Full passive range of motion without pain, normal range of motion and neck supple. Skin:     General: Skin is warm and dry. Findings: No rash. Neurological:      Mental Status: She is alert and oriented to person, place, and time. Psychiatric:         Speech: Speech normal.         Behavior: Behavior normal.         Thought Content: Thought content normal.         Judgment: Judgment normal.        Medical Decision Making  We will do labLeft lower quadrant pain with mild bleeding for the past 3 days. Patient has stable vital signs in the ER. Absent CAT scan with differential of internal or external hemorrhoid versus anal fissure versus diverticulitis. Amount and/or Complexity of Data Reviewed  Labs: ordered. Radiology: ordered.     CT ABD PELV W CONT [[id number]] Collected: 01/25/23 2351   Order Status: Completed Updated: 01/25/23 2355 Narrative:       CLINICAL HISTORY: rectal bleeding   INDICATION: rectal bleeding   COMPARISON: None. CONTRAST: 100  ml Isovue 370   TECHNIQUE:   Multislice helical CT was performed of the abdomen and pelvis following   uneventful rapid bolus intravenous contrast administration. Oral contrast was   not administered. Contiguous 5 mm axial images were reconstructed and lung and   soft tissue windows were generated. Coronal and sagittal reformations were   generated. CT dose reduction was achieved through use of a standardized   protocol tailored for this examination and automatic exposure control for dose   modulation. FINDINGS:   LUNG BASES:No mass lesion or effusion. LIVER: Hepatic steatosis. There is no intrahepatic duct dilatation. There is no   hepatic parenchymal mass. Hepatic enhancement pattern is within normal limits. Portal vein is patent. GALLBLADDER:  No dilatation or wall thickening. SPLEEN/PANCREAS: No mass. There is no pancreatic duct dilatation. There is no   evidence of splenomegaly. ADRENALS/KIDNEYS: No mass. There is no hydronephrosis. There is no renal mass. There is no perinephric mass. STOMACH: No dilatation or wall thickening. COLON AND SMALL BOWEL: Fecal stasis. There is no free intraperitoneal air. There   is no evidence of incarceration or obstruction. No mesenteric adenopathy. PERITONEUM: Unremarkable. APPENDIX: Unremarkable. BLADDER/REPRODUCTIVE ORGANS: Left adnexal cyst. IUD in place. RETROPERITONEUM: Unremarkable. The abdominal aorta is normal in caliber. No   aneurysm. No retroperitoneal adenopathy. OSSEOUS STRUCTURES: No destructive bone lesion. Impression:     No acute intraperitoneal process is identified. Please see above for additional nonemergent incidental findings.        HCG URINE, QL. - POC [[id number]] Collected: 01/25/23 2322   Order Status: Completed Specimen: Urine Updated: 01/25/23 2323    Pregnancy test,urine (POC) Negative NEG URINALYSIS W/ RFLX MICROSCOPIC [[id number]] (Abnormal) Collected: 01/25/23 2210   Order Status: Completed Specimen: Urine Updated: 01/25/23 2238    Color YELLOW/STRAW       Comment: Color Reference Range: Straw, Yellow or Dark Yellow       Appearance CLEAR CLEAR       Specific gravity <1.005 1.003 - 1.030     pH (UA) 6.5 5.0 - 8.0       Protein Negative NEG mg/dL     Glucose Negative NEG mg/dL     Ketone TRACE Abnormal  NEG mg/dL     Bilirubin Negative NEG       Blood Negative NEG       Urobilinogen 1.0 0.2 - 1.0 EU/dL     Nitrites Negative NEG       Leukocyte Esterase Negative NEG      COMPREHENSIVE METABOLIC PANEL [[id number]] (Abnormal) Collected: 01/25/23 2157   Order Status: Completed Specimen: Serum or Plasma Updated: 01/25/23 2233    Sodium 140 136 - 145 mmol/L     Potassium 3.7 3.5 - 5.1 mmol/L     Chloride 105 97 - 108 mmol/L     CO2 28 21 - 32 mmol/L     Anion gap 7 5 - 15 mmol/L     Glucose 87 65 - 100 mg/dL     BUN 21 High  6 - 20 MG/DL     Creatinine 0.77 0.55 - 1.02 MG/DL     BUN/Creatinine ratio 27 High  12 - 20       eGFR >60 >60 ml/min/1.73m2     Comment:      Pediatric calculator link: [email or web address] org/professionals/kdoqi/gfr_calculatorped         These results are not intended for use in patients <25years of age. eGFR results are calculated without a race factor using  the 2021 CKD-EPI equation. Careful clinical correlation is recommended, particularly when comparing to results calculated using previous equations. The CKD-EPI equation is less accurate in patients with extremes of muscle mass, extra-renal metabolism of creatinine, excessive creatine ingestion, or following therapy that affects renal tubular secretion. Calcium 9.2 8.5 - 10.1 MG/DL     Bilirubin, total 0.7 0.2 - 1.0 MG/DL     ALT (SGPT) 34 12 - 78 U/L     AST (SGOT) 12 Low  15 - 37 U/L     Alk.  phosphatase 61 45 - 117 U/L     Protein, total 7.8 6.4 - 8.2 g/dL     Albumin 4.0 3.5 - 5.0 g/dL     Globulin 3.8 2.0 - 4.0 g/dL     A-G Ratio 1.1 1.1 - 2.2      LIPASE [[id number]] Collected: 01/25/23 2157   Order Status: Completed Specimen: Serum or Plasma Updated: 01/25/23 2233    Lipase 148 73 - 393 U/L    URINE CULTURE HOLD SAMPLE [[id number]] Collected: 01/25/23 2210   Order Status: Completed Specimen: Urine Updated: 01/25/23 2214    Urine culture hold      Urine on hold in Microbiology dept for 2 days. If unpreserved urine is submitted, it cannot be used for addtional testing after 24 hours, recollection will be required. Ref Range:     CBC WITH AUTOMATED DIFF [[id number]] Collected: 01/25/23 2157   Order Status: Completed Specimen: Whole Blood Updated: 01/25/23 2211    WBC 9.9 3.6 - 11.0 K/uL     RBC 4.54 3.80 - 5.20 M/uL     HGB 14.1 11.5 - 16.0 g/dL     HCT 41.1 35.0 - 47.0 %     MCV 90.5 80.0 - 99.0 FL     MCH 31.1 26.0 - 34.0 PG     MCHC 34.3 30.0 - 36.5 g/dL     RDW 12.9 11.5 - 14.5 %     PLATELET 428 172 - 466 K/uL     MPV 10.8 8.9 - 12.9 FL     NRBC 0.0 0  WBC     ABSOLUTE NRBC 0.00 0.00 - 0.01 K/uL     NEUTROPHILS 69 32 - 75 %     LYMPHOCYTES 20 12 - 49 %     MONOCYTES 8 5 - 13 %     EOSINOPHILS 2 0 - 7 %     BASOPHILS 1 0 - 1 %     IMMATURE GRANULOCYTES 0 0.0 - 0.5 %     ABS. NEUTROPHILS 6.8 1.8 - 8.0 K/UL     ABS. LYMPHOCYTES 2.0 0.8 - 3.5 K/UL     ABS. MONOCYTES 0.8 0.0 - 1.0 K/UL     ABS. EOSINOPHILS 0.2 0.0 - 0.4 K/UL     ABS. BASOPHILS 0.1 0.0 - 0.1 K/UL     ABS. IMM. GRANS. 0.0 0.00 - 0.04 K/UL     DF AUTOMATED      SAMPLE TO BLOOD BANK [[email or web address]] Collected: 01/25/23 2157   Order Status: No result Updated: 01/25/23 2208   SAMPLES BEING Nathaly Red [[id number]] Collected: 01/25/23 2157   Order Status: Completed Specimen: Miscellaneous sample Updated: 01/25/23 2206    SAMPLES BEING HELD 1RED,1BLUE,1SST    COMMENT      Add-on orders for these samples will be processed based on acceptable specimen integrity and analyte stability, which may vary by analyte. Ref Range:          Labs were negative.   Hemoglobin was normal.  CT abdomen pelvis was negative. Rectal exam shows anal fissure and patient is informed and suitable for discharge to home.   Procedures

## 2023-01-26 NOTE — ED TRIAGE NOTES
Patient reports 3 days of rectal bleeding that is in the toilet states their was a clot today. Adds LLQ pain.  Denies F/V/D/C

## 2023-08-09 NOTE — PROGRESS NOTES
Labs drawn. - P/w RUQ abdominal pain and vomiting  - S/p CT A/P showed small cholelithiasis; Nonspecific trace pericholecystic fluid; Stable dilated  common bile duct and main pancreatic duct; Nonspecific mild distal esophageal mural thickening; Small ascites; Anasarca; Small bilateral pleural effusions; Small bilateral atelectasis; Nonspecific ground glass densities in the right lower lung zone; Cardiomegaly and mild pericardial effusion, unchanged.  - S/p HIDA negative for acute cholecystitis  - C/w Pain mgmt   - GI-Dr. Lee consulted-No planned intervention

## 2023-09-07 ENCOUNTER — ANESTHESIA EVENT (OUTPATIENT)
Facility: HOSPITAL | Age: 38
End: 2023-09-07
Payer: MEDICAID

## 2023-09-07 ENCOUNTER — ANESTHESIA (OUTPATIENT)
Facility: HOSPITAL | Age: 38
End: 2023-09-07
Payer: MEDICAID

## 2023-09-07 ENCOUNTER — HOSPITAL ENCOUNTER (OUTPATIENT)
Facility: HOSPITAL | Age: 38
Setting detail: OUTPATIENT SURGERY
Discharge: HOME | End: 2023-09-07
Attending: INTERNAL MEDICINE | Admitting: INTERNAL MEDICINE
Payer: MEDICAID

## 2023-09-07 VITALS
SYSTOLIC BLOOD PRESSURE: 106 MMHG | OXYGEN SATURATION: 100 % | RESPIRATION RATE: 16 BRPM | BODY MASS INDEX: 31.69 KG/M2 | HEART RATE: 84 BPM | DIASTOLIC BLOOD PRESSURE: 67 MMHG | WEIGHT: 185.63 LBS | HEIGHT: 64 IN | TEMPERATURE: 98.7 F

## 2023-09-07 PROCEDURE — 6360000002 HC RX W HCPCS: Performed by: NURSE ANESTHETIST, CERTIFIED REGISTERED

## 2023-09-07 PROCEDURE — 3700000001 HC ADD 15 MINUTES (ANESTHESIA): Performed by: INTERNAL MEDICINE

## 2023-09-07 PROCEDURE — 88305 TISSUE EXAM BY PATHOLOGIST: CPT

## 2023-09-07 PROCEDURE — 7100000011 HC PHASE II RECOVERY - ADDTL 15 MIN: Performed by: INTERNAL MEDICINE

## 2023-09-07 PROCEDURE — 3600007502: Performed by: INTERNAL MEDICINE

## 2023-09-07 PROCEDURE — 2500000003 HC RX 250 WO HCPCS: Performed by: NURSE ANESTHETIST, CERTIFIED REGISTERED

## 2023-09-07 PROCEDURE — 7100000010 HC PHASE II RECOVERY - FIRST 15 MIN: Performed by: INTERNAL MEDICINE

## 2023-09-07 PROCEDURE — 2580000003 HC RX 258: Performed by: INTERNAL MEDICINE

## 2023-09-07 PROCEDURE — 3600007512: Performed by: INTERNAL MEDICINE

## 2023-09-07 PROCEDURE — 3700000000 HC ANESTHESIA ATTENDED CARE: Performed by: INTERNAL MEDICINE

## 2023-09-07 RX ORDER — EPHEDRINE SULFATE/0.9% NACL/PF 50 MG/5 ML
SYRINGE (ML) INTRAVENOUS PRN
Status: DISCONTINUED | OUTPATIENT
Start: 2023-09-07 | End: 2023-09-07 | Stop reason: SDUPTHER

## 2023-09-07 RX ORDER — LIDOCAINE HCL/PF 100 MG/5ML
SYRINGE (ML) INJECTION PRN
Status: DISCONTINUED | OUTPATIENT
Start: 2023-09-07 | End: 2023-09-07 | Stop reason: SDUPTHER

## 2023-09-07 RX ORDER — PROPOFOL 10 MG/ML
INJECTION, EMULSION INTRAVENOUS PRN
Status: DISCONTINUED | OUTPATIENT
Start: 2023-09-07 | End: 2023-09-07 | Stop reason: SDUPTHER

## 2023-09-07 RX ORDER — DEXMEDETOMIDINE HYDROCHLORIDE 100 UG/ML
INJECTION, SOLUTION INTRAVENOUS PRN
Status: DISCONTINUED | OUTPATIENT
Start: 2023-09-07 | End: 2023-09-07 | Stop reason: SDUPTHER

## 2023-09-07 RX ORDER — SODIUM CHLORIDE 9 MG/ML
INJECTION, SOLUTION INTRAVENOUS CONTINUOUS
Status: DISCONTINUED | OUTPATIENT
Start: 2023-09-07 | End: 2023-09-07 | Stop reason: HOSPADM

## 2023-09-07 RX ORDER — MIDAZOLAM HYDROCHLORIDE 1 MG/ML
INJECTION INTRAMUSCULAR; INTRAVENOUS PRN
Status: DISCONTINUED | OUTPATIENT
Start: 2023-09-07 | End: 2023-09-07 | Stop reason: SDUPTHER

## 2023-09-07 RX ORDER — PROPOFOL 10 MG/ML
INJECTION, EMULSION INTRAVENOUS CONTINUOUS PRN
Status: DISCONTINUED | OUTPATIENT
Start: 2023-09-07 | End: 2023-09-07 | Stop reason: SDUPTHER

## 2023-09-07 RX ADMIN — LIDOCAINE HYDROCHLORIDE 60 MG: 20 INJECTION, SOLUTION INTRAVENOUS at 09:39

## 2023-09-07 RX ADMIN — LIDOCAINE HYDROCHLORIDE 4 MG: 20 INJECTION, SOLUTION INTRAVENOUS at 09:00

## 2023-09-07 RX ADMIN — PROPOFOL 50 MG: 10 INJECTION, EMULSION INTRAVENOUS at 09:46

## 2023-09-07 RX ADMIN — LIDOCAINE HYDROCHLORIDE 40 MG: 20 INJECTION, SOLUTION INTRAVENOUS at 09:43

## 2023-09-07 RX ADMIN — Medication 20 MG: at 10:01

## 2023-09-07 RX ADMIN — MIDAZOLAM HYDROCHLORIDE 2 MG: 1 INJECTION, SOLUTION INTRAMUSCULAR; INTRAVENOUS at 09:38

## 2023-09-07 RX ADMIN — SODIUM CHLORIDE: 9 INJECTION, SOLUTION INTRAVENOUS at 09:00

## 2023-09-07 RX ADMIN — PROPOFOL 50 MG: 10 INJECTION, EMULSION INTRAVENOUS at 09:43

## 2023-09-07 RX ADMIN — PROPOFOL 50 MG: 10 INJECTION, EMULSION INTRAVENOUS at 09:39

## 2023-09-07 RX ADMIN — DEXMEDETOMIDINE 10 MCG: 100 INJECTION, SOLUTION INTRAVENOUS at 09:45

## 2023-09-07 RX ADMIN — DEXMEDETOMIDINE 10 MCG: 100 INJECTION, SOLUTION INTRAVENOUS at 09:48

## 2023-09-07 RX ADMIN — PROPOFOL 150 MCG/KG/MIN: 10 INJECTION, EMULSION INTRAVENOUS at 09:39

## 2023-09-07 ASSESSMENT — PAIN - FUNCTIONAL ASSESSMENT: PAIN_FUNCTIONAL_ASSESSMENT: 0-10

## 2023-09-07 NOTE — ANESTHESIA POSTPROCEDURE EVALUATION
Department of Anesthesiology  Postprocedure Note    Patient: Sourav Green  MRN: [de-identified]  YOB: 1985  Date of evaluation: 9/7/2023      Procedure Summary     Date: 09/07/23 Room / Location: [facility] ENDO  / [facility] ENDOSCOPY    Anesthesia Start: 0936 Anesthesia Stop: 1003    Procedures:       COLONOSCOPY (Lower GI Region)      COLONOSCOPY POLYPECTOMY SNARE/COLD BIOPSY (Lower GI Region) Diagnosis:       Family history of malignant neoplasm of gastrointestinal tract      Family history of colonic polyps      (Family history of malignant neoplasm of gastrointestinal tract [Z80.0])      (Family history of colonic polyps [Z83.71])    Surgeons: Kirk Wyatt MD Responsible Provider: Cale Nunez MD    Anesthesia Type: MAC ASA Status: 1          Anesthesia Type: No value filed.     Tameka Phase I: Tameka Score: 10    Tameka Phase II: Tameka Score: 10      Anesthesia Post Evaluation    Patient location during evaluation: bedside  Patient participation: complete - patient participated  Level of consciousness: awake and alert and responsive to verbal stimuli  Pain score: 0  Airway patency: patent  Nausea & Vomiting: no nausea and no vomiting  Complications: no  Cardiovascular status: hemodynamically stable  Respiratory status: acceptable and room air  Hydration status: stable  Pain management: adequate

## 2023-09-07 NOTE — PROGRESS NOTES
Endoscopy discharge instructions have been reviewed and given to patient. The patient verbalized understanding and acceptance of instructions. Dr. Fay Morales discussed with patient procedure findings and next steps.

## 2023-09-07 NOTE — OP NOTE
Martha Palmer M.D.  [phone]            2023          Colonoscopy Operative Report  Sean Kuo  :  1985  [place] Medical Record Number:  [id number]      Indications:    Family history of coloretal cancer (screening only), Personal history of colon polyps (screening only)     :  Enrique Newton MD    Referring Provider: Vesta Chery MD    Sedation:  MAC anesthesia    Pre-Procedural Exam:      Airway: clear,  No airway problems anticipated  Heart: RRR, without gallops or rubs  Lungs: clear bilaterally without wheezes, crackles, or rhonchi  Abdomen: soft, nontender, nondistended, bowel sounds present  Mental Status: awake, alert and oriented to person, place and time     Procedure Details:  After informed consent was obtained with all risks and benefits of procedure explained and preoperative exam completed, the patient was taken to the endoscopy suite and placed in the left lateral decubitus position. Upon sequential sedation as per above, a digital rectal exam was performed. The Olympus videocolonoscope  was inserted in the rectum and carefully advanced to the cecum, which was identified by the ileocecal valve and appendiceal orifice. The quality of preparation was good. The colonoscope was slowly withdrawn with careful inspection and evaluation between folds. Retroflexion in the rectum was performed. Findings:   Terminal Ileum: not intubated  Cecum: normal  Ascending Colon: normal  Transverse Colon: 1  Sessile polyp(s), the largest 4 mm in size; Descending Colon: normal  Sigmoid: normal  Rectum: 2  Sessile polyp(s), the largest 3 mm in size  Grade 1 internal hemorrhoid(s);     Interventions:  3 complete polypectomy were performed using cold snare  and the polyps were  retrieved    Specimen Removed:  specimen #1, 4 mm in size, located in the transverse colon removed by cold snare and retrieved for pathology  #2, 2, 3 mm in size, located in

## 2023-09-07 NOTE — H&P
Luc Banuelos M.D.  [phone]    History and Physical       NAME:  Bety Nation   :   1985   MRN:   [id number]           Consult Date: 2023 9:36 AM    Chief Complaint:  Colon polyp surveillance    History of Present Illness:  Patient is a 45 y.o. who is seen for colon polyp surveillance. Denies any ongoing GI complaints.       PMH:  Past Medical History:   Diagnosis Date    Family history of colon cancer     Paternal grandmother    Family history of colonic polyps     History of colon polyps     Irregular menses     Kidney stones     PCOS (polycystic ovarian syndrome)     Pseudogout        PSH:  Past Surgical History:   Procedure Laterality Date    COLONOSCOPY N/A 2/3/2020    COLONOSCOPY performed by Radha Hassan MD at [facility] ENDOSCOPY    COLONOSCOPY N/A 5/10/2021    COLONOSCOPY performed by Radha Hassan MD at [facility] ENDOSCOPY       Allergies:  No Known Allergies    Home Medications:  None       Hospital Medications:  Current Facility-Administered Medications   Medication Dose Route Frequency    0.9 % sodium chloride infusion   IntraVENous Continuous       Social History:  Social History     Tobacco Use    Smoking status: Never    Smokeless tobacco: Never   Substance Use Topics    Alcohol use: No       Family History:  Family History   Problem Relation Age of Onset    Other Sister         scolosis    Colon Cancer Paternal Grandmother     Cancer Paternal Grandmother     Stroke Mother     Hypertension Mother     Colon Cancer Mother     Colon Cancer Father     Elevated Lipids Father     Other Mother         Sturge Rose Syndrome    Cancer Mother         skin    Asthma Father              Review of Systems:      Constitutional: negative fever, negative chills, negative weight loss  Eyes:   negative visual changes  ENT:   negative sore throat, tongue or lip swelling  Respiratory:  negative cough, negative dyspnea  Cards:  negative for chest pain, palpitations, lower

## 2023-09-07 NOTE — DISCHARGE INSTRUCTIONS
[address] [facility] Hanane Whiteside M.D.  [phone]            COLON DISCHARGE INSTRUCTIONS       2023    Sean Kuo  :  1985  Danny Medical Record Number:  [id number]      COLONOSCOPY FINDINGS:  Your colonoscopy showed three small polyps that were removed and sent to pathology, small internal hemorrhoids, otherwise looked within normal.    DISCOMFORT:  Redness at IV site- apply warm compress to area; if redness or soreness persist- contact your physician  There may be a slight amount of blood passed from the rectum  Gaseous discomfort- walking, belching will help relieve any discomfort  You may not operate a vehicle for 12 hours  You may not engage in an occupation involving machinery or appliances for rest of today  You may not drink alcoholic beverages for at least 12 hours  Avoid making any critical decisions for at least 24 hour  DIET:   High fiber diet   - however -  remember your colon is empty and a heavy meal will produce gas. Avoid these foods:  vegetables, fried / greasy foods, carbonated drinks for today     ACTIVITY:  You may resume your normal daily activities it is recommended that you spend the remainder of the day resting -  avoid any strenuous activity. CALL M.D. ANY SIGN OF:   Increasing pain, nausea, vomiting  Abdominal distension (swelling)  New increased bleeding (oral or rectal)  Fever (chills)  Pain in chest area  Bloody discharge from nose or mouth   Shortness of breath    Follow-up Instructions:   Call Dr. Fern Salazar if any questions or problems. Telephone # [phone]  Biopsy results will be available in  5 to 7 days  Should have a repeat colonoscopy in 2 years.

## 2023-09-07 NOTE — PROGRESS NOTES
Marcel Gagnon  1985  [id number]    Situation:  Verbal report received from: CAMI Spears  Procedure: Procedure(s):  COLONOSCOPY  COLONOSCOPY POLYPECTOMY SNARE/COLD BIOPSY    Background:    Preoperative diagnosis: Family history of malignant neoplasm of gastrointestinal tract [Z80.0]  Family history of colonic polyps [Z83.71]  Postoperative diagnosis: * No post-op diagnosis entered *    :  Dr. Paige Giang   Assistant(s): Circulator: Candida Garcia RN  Endoscopy Technician: Barbara Gonzales    Specimens:   ID Type Source Tests Collected by Time Destination   A : TRANSVERSE COLON POLYP Tissue Colon-Transverse SURGICAL PATHOLOGY Justice Rosas MD 9/7/2023 0949    B : RECTUM POLYPS Tissue Rectum SURGICAL PATHOLOGY Justice Rosas MD 9/7/2023 0953      H. Pylori    no    Assessment:  Intra-procedure medications     Anesthesia gave intra-procedure sedation and medications, see anesthesia flow sheet   yes    Intravenous fluids: NS@ KVO     Vital signs stable   yes    Abdominal assessment: round and soft   yes    Recommendation:  Discharge patient per MD order  yes.   Return to floor  outpatient  Family or Friend   spouse   Permission to share finding with family or friend   yes

## 2023-09-07 NOTE — PERIOP NOTE
Initial RN admission and assessment performed and documented in Endoscopy navigator. Patient evaluated by anesthesia in pre-procedure holding. All procedural vital signs, airway assessment, and level of consciousness information monitored and recorded by anesthesia staff on the anesthesia record. Report received from [address] [place] [address] post procedure. Patient transported to recovery area by RN.     Report given to post procedure RNMinda Lung was pre-cleaned at bedside immediately following procedure by Aman Michaels.

## 2023-11-20 ENCOUNTER — OFFICE VISIT (OUTPATIENT)
Age: 38
End: 2023-11-20
Payer: MEDICAID

## 2023-11-20 VITALS
HEART RATE: 72 BPM | RESPIRATION RATE: 17 BRPM | OXYGEN SATURATION: 98 % | SYSTOLIC BLOOD PRESSURE: 112 MMHG | TEMPERATURE: 97.7 F | HEIGHT: 64 IN | BODY MASS INDEX: 32.61 KG/M2 | WEIGHT: 191 LBS | DIASTOLIC BLOOD PRESSURE: 73 MMHG

## 2023-11-20 DIAGNOSIS — N30.00 ACUTE CYSTITIS WITHOUT HEMATURIA: Primary | ICD-10-CM

## 2023-11-20 DIAGNOSIS — R30.0 DYSURIA: ICD-10-CM

## 2023-11-20 LAB
BILIRUBIN, URINE, POC: NEGATIVE
BLOOD URINE, POC: NORMAL
GLUCOSE URINE, POC: NEGATIVE
KETONES, URINE, POC: NEGATIVE
LEUKOCYTE ESTERASE, URINE, POC: NORMAL
NITRITE, URINE, POC: NEGATIVE
PH, URINE, POC: 6 (ref 4.6–8)
PROTEIN,URINE, POC: NEGATIVE
SPECIFIC GRAVITY, URINE, POC: 1.02 (ref 1–1.03)
URINALYSIS CLARITY, POC: NORMAL
URINALYSIS COLOR, POC: YELLOW
UROBILINOGEN, POC: NORMAL

## 2023-11-20 PROCEDURE — 99213 OFFICE O/P EST LOW 20 MIN: CPT | Performed by: STUDENT IN AN ORGANIZED HEALTH CARE EDUCATION/TRAINING PROGRAM

## 2023-11-20 PROCEDURE — 81003 URINALYSIS AUTO W/O SCOPE: CPT | Performed by: STUDENT IN AN ORGANIZED HEALTH CARE EDUCATION/TRAINING PROGRAM

## 2023-11-20 RX ORDER — SULFAMETHOXAZOLE AND TRIMETHOPRIM 800; 160 MG/1; MG/1
1 TABLET ORAL 2 TIMES DAILY
Qty: 14 TABLET | Refills: 0 | Status: SHIPPED | OUTPATIENT
Start: 2023-11-20 | End: 2023-11-27

## 2023-11-20 SDOH — ECONOMIC STABILITY: FOOD INSECURITY: WITHIN THE PAST 12 MONTHS, THE FOOD YOU BOUGHT JUST DIDN'T LAST AND YOU DIDN'T HAVE MONEY TO GET MORE.: NEVER TRUE

## 2023-11-20 SDOH — ECONOMIC STABILITY: FOOD INSECURITY: WITHIN THE PAST 12 MONTHS, YOU WORRIED THAT YOUR FOOD WOULD RUN OUT BEFORE YOU GOT MONEY TO BUY MORE.: NEVER TRUE

## 2023-11-20 SDOH — ECONOMIC STABILITY: HOUSING INSECURITY
IN THE LAST 12 MONTHS, WAS THERE A TIME WHEN YOU DID NOT HAVE A STEADY PLACE TO SLEEP OR SLEPT IN A SHELTER (INCLUDING NOW)?: NO

## 2023-11-20 SDOH — ECONOMIC STABILITY: INCOME INSECURITY: HOW HARD IS IT FOR YOU TO PAY FOR THE VERY BASICS LIKE FOOD, HOUSING, MEDICAL CARE, AND HEATING?: NOT HARD AT ALL

## 2023-11-20 ASSESSMENT — PATIENT HEALTH QUESTIONNAIRE - PHQ9
SUM OF ALL RESPONSES TO PHQ9 QUESTIONS 1 & 2: 0
2. FEELING DOWN, DEPRESSED OR HOPELESS: 0
SUM OF ALL RESPONSES TO PHQ QUESTIONS 1-9: 0
1. LITTLE INTEREST OR PLEASURE IN DOING THINGS: 0
SUM OF ALL RESPONSES TO PHQ QUESTIONS 1-9: 0

## 2023-11-23 LAB
BACTERIA SPEC CULT: ABNORMAL
CC UR VC: ABNORMAL
SERVICE CMNT-IMP: ABNORMAL

## 2024-06-10 NOTE — PROGRESS NOTES
I reviewed with the resident the medical history and the resident's findings on the physical examination. I discussed with the resident the patient's diagnosis and concur with the plan. EMT/paramedic

## 2024-07-14 NOTE — TELEPHONE ENCOUNTER
Called [facility] and they said that they shipped out her device today.   Call patient to inform her Headache

## 2025-07-10 NOTE — PROGRESS NOTES
[facility] Medicine [facility] Family Medicine Residency    Subjective   Bibiana Kuo is a 40 y.o. female who presents for Follow-Up from Hospital (Pt has ovarian cyst.)    2 weeks prior to [facility] ED visit on 7/3 she was feeling pelvic pressure exacerbated by urinating/bowel movements/intercourse. During that visit, a CT scan revealed a large 7.8 x 5 x 5.3 cm homogenous cyst in the L ovary. Symptoms have been getting worse: nausea, decreased appetite, cramping. She takes tylenol and was prescribed hydrocodone by the hospital. Pain is inadequately controlled.    Hx PCOS: never had cysts, but had issues with weight gain and used progesterone pill prior to Mirena  Hx irregular menses, no periods now  Birth control: Mirena placed in 2020  The last time she saw an ob gyn was in 2020      Review of Systems   As per HPI    Medical History  Past Medical History:   Diagnosis Date    Family history of colon cancer     Paternal grandmother    Family history of colonic polyps     GERD (gastroesophageal reflux disease) 2020    Pre-cancerous polyps removed, repeat colonoscopies every 1-2 years    History of colon polyps     Irregular menses     Kidney stones     Osteoarthritis     Ovarian cyst 2025    PCOS (polycystic ovarian syndrome)     Pseudogout 2019       Medications  Current Outpatient Medications   Medication Sig    levonorgestrel (MIRENA, 52 MG,) IUD 52 mg 1 each by IntraUTERine route once    HYDROcodone-acetaminophen (NORCO) 5-325 MG per tablet Take 1 tablet by mouth every 6 hours as needed for Pain. Max Daily Amount: 4 tablets    Phenazopyridine HCl (AZO-DINE URINARY ANALGESIC PO) Take by mouth     No current facility-administered medications for this visit.       Immunizations   Immunization History   Administered Date(s) Administered    COVID-19, PFIZER PURPLE top, DILUTE for use, (age 12 y+), 30mcg/0.3mL 08/25/2021, 09/14/2021    Influenza, FLUARIX, FLULAVAL, FLUZONE (age 6 mo+) and

## 2025-07-11 ENCOUNTER — OFFICE VISIT (OUTPATIENT)
Age: 40
End: 2025-07-11
Payer: MEDICAID

## 2025-07-11 VITALS
TEMPERATURE: 98.2 F | SYSTOLIC BLOOD PRESSURE: 107 MMHG | HEIGHT: 64 IN | DIASTOLIC BLOOD PRESSURE: 68 MMHG | HEART RATE: 67 BPM | OXYGEN SATURATION: 99 % | BODY MASS INDEX: 30.22 KG/M2 | WEIGHT: 177 LBS | RESPIRATION RATE: 18 BRPM

## 2025-07-11 DIAGNOSIS — E28.2 PCOS (POLYCYSTIC OVARIAN SYNDROME): ICD-10-CM

## 2025-07-11 DIAGNOSIS — N83.202 LEFT OVARIAN CYST: Primary | ICD-10-CM

## 2025-07-11 PROCEDURE — 99214 OFFICE O/P EST MOD 30 MIN: CPT

## 2025-07-11 RX ORDER — HYDROCODONE BITARTRATE AND ACETAMINOPHEN 5; 325 MG/1; MG/1
1 TABLET ORAL EVERY 6 HOURS PRN
COMMUNITY

## 2025-07-11 RX ORDER — LEVONORGESTREL 52 MG/1
1 INTRAUTERINE DEVICE INTRAUTERINE ONCE
COMMUNITY

## 2025-07-11 SDOH — ECONOMIC STABILITY: INCOME INSECURITY: IN THE LAST 12 MONTHS, WAS THERE A TIME WHEN YOU WERE NOT ABLE TO PAY THE MORTGAGE OR RENT ON TIME?: NO

## 2025-07-11 SDOH — ECONOMIC STABILITY: TRANSPORTATION INSECURITY
IN THE PAST 12 MONTHS, HAS LACK OF TRANSPORTATION KEPT YOU FROM MEETINGS, WORK, OR FROM GETTING THINGS NEEDED FOR DAILY LIVING?: NO

## 2025-07-11 SDOH — ECONOMIC STABILITY: FOOD INSECURITY: WITHIN THE PAST 12 MONTHS, THE FOOD YOU BOUGHT JUST DIDN'T LAST AND YOU DIDN'T HAVE MONEY TO GET MORE.: NEVER TRUE

## 2025-07-11 SDOH — ECONOMIC STABILITY: FOOD INSECURITY: WITHIN THE PAST 12 MONTHS, YOU WORRIED THAT YOUR FOOD WOULD RUN OUT BEFORE YOU GOT MONEY TO BUY MORE.: NEVER TRUE

## 2025-07-11 SDOH — ECONOMIC STABILITY: TRANSPORTATION INSECURITY
IN THE PAST 12 MONTHS, HAS THE LACK OF TRANSPORTATION KEPT YOU FROM MEDICAL APPOINTMENTS OR FROM GETTING MEDICATIONS?: NO

## 2025-07-11 ASSESSMENT — PATIENT HEALTH QUESTIONNAIRE - PHQ9
1. LITTLE INTEREST OR PLEASURE IN DOING THINGS: NOT AT ALL
SUM OF ALL RESPONSES TO PHQ QUESTIONS 1-9: 0
2. FEELING DOWN, DEPRESSED OR HOPELESS: NOT AT ALL
SUM OF ALL RESPONSES TO PHQ QUESTIONS 1-9: 0

## 2025-07-11 NOTE — PROGRESS NOTES
Roomed by name and .    Chief Complaint   Patient presents with    Follow-Up from Hospital     Pt has ovarian cyst.        Vitals:    25 1608   BP: 107/68   Pulse: 67   Resp: 18   Temp: 98.2 °F (36.8 °C)   TempSrc: Temporal   SpO2: 99%   Weight: 80.3 kg (177 lb)   Height: 1.626 m (5' 4\")          \"Have you been to the ER, urgent care clinic since your last visit?  Hospitalized since your last visit?\"    Yes. [facility] ER on 7/3/25 for an ovarian cyst.  Pt brought records.    “Have you seen or consulted any other health care providers outside of [facility] since your last visit?”    See above note.                              Click Here for Release of Records Request

## (undated) DEVICE — CONTAINER SPEC 20 ML LID NEUT BUFF FORMALIN 10 % POLYPR STS

## (undated) DEVICE — SNARE ENDOSCP M L240CM W27MM SHTH DIA2.4MM CHN 2.8MM OVL

## (undated) DEVICE — ELECTRODE,RADIOTRANSLUCENT,FOAM,3PK: Brand: MEDLINE

## (undated) DEVICE — SET GRAV CK VLV NEEDLESS ST 3 GANGED 4WAY STPCOCK HI FLO 10

## (undated) DEVICE — SET ADMIN 16ML TBNG L100IN 2 Y INJ SITE IV PIGGY BK DISP

## (undated) DEVICE — KIT COLON W/ 1.1OZ LUB AND 2 END

## (undated) DEVICE — CATH IV AUTOGRD BC PNK 20GA 25 -- INSYTE

## (undated) DEVICE — BAG BELONG PT PERS CLEAR HANDL

## (undated) DEVICE — POLYP TRAP: Brand: TRAPEASE®

## (undated) DEVICE — SIMPLICITY FLUFF UNDERPAD 23X36, MODERATE: Brand: SIMPLICITY

## (undated) DEVICE — (D)SENSOR RMFG 02 PULS OXMTR -- DISC BY MFR USE ITEM 133445

## (undated) DEVICE — BAG SPEC BIOHZRD 10 X 10 IN --

## (undated) DEVICE — 3M™ CUROS™ DISINFECTING CAP FOR NEEDLELESS CONNECTORS 270/CARTON 20 CARTONS/CASE CFF1-270: Brand: CUROS™

## (undated) DEVICE — SENSOR SPO2 NELLCOR FLX REUSE --

## (undated) DEVICE — SYR 50ML SLIP TIP NSAF LF STRL --

## (undated) DEVICE — 1200 GUARD II KIT W/5MM TUBE W/O VAC TUBE: Brand: GUARDIAN

## (undated) DEVICE — SOLIDIFIER MEDC 1200ML -- CONVERT TO 356117

## (undated) DEVICE — Device

## (undated) DEVICE — CANN NASAL O2 CAPNOGRAPHY AD -- FILTERLINE

## (undated) DEVICE — REM POLYHESIVE ADULT PATIENT RETURN ELECTRODE: Brand: VALLEYLAB

## (undated) DEVICE — CUFF RMFG BP INF SZ 11 DISP -- LAWSON OEM ITEM 238915